# Patient Record
Sex: FEMALE | Race: WHITE | NOT HISPANIC OR LATINO
[De-identification: names, ages, dates, MRNs, and addresses within clinical notes are randomized per-mention and may not be internally consistent; named-entity substitution may affect disease eponyms.]

---

## 2019-02-05 PROBLEM — Z00.00 ENCOUNTER FOR PREVENTIVE HEALTH EXAMINATION: Status: ACTIVE | Noted: 2019-02-05

## 2019-03-07 ENCOUNTER — APPOINTMENT (OUTPATIENT)
Dept: ORTHOPEDIC SURGERY | Facility: CLINIC | Age: 74
End: 2019-03-07

## 2023-02-16 ENCOUNTER — INPATIENT (INPATIENT)
Facility: HOSPITAL | Age: 78
LOS: 11 days | Discharge: NURSING, REHAB OR LONG-TERM CARE | DRG: 380 | End: 2023-02-28
Attending: INTERNAL MEDICINE | Admitting: STUDENT IN AN ORGANIZED HEALTH CARE EDUCATION/TRAINING PROGRAM
Payer: MEDICARE

## 2023-02-16 VITALS
OXYGEN SATURATION: 92 % | WEIGHT: 184.97 LBS | HEIGHT: 65 IN | HEART RATE: 84 BPM | DIASTOLIC BLOOD PRESSURE: 62 MMHG | SYSTOLIC BLOOD PRESSURE: 87 MMHG

## 2023-02-16 DIAGNOSIS — D64.9 ANEMIA, UNSPECIFIED: ICD-10-CM

## 2023-02-16 DIAGNOSIS — F41.9 ANXIETY DISORDER, UNSPECIFIED: ICD-10-CM

## 2023-02-16 DIAGNOSIS — J69.0 PNEUMONITIS DUE TO INHALATION OF FOOD AND VOMIT: ICD-10-CM

## 2023-02-16 DIAGNOSIS — I10 ESSENTIAL (PRIMARY) HYPERTENSION: ICD-10-CM

## 2023-02-16 DIAGNOSIS — E78.5 HYPERLIPIDEMIA, UNSPECIFIED: ICD-10-CM

## 2023-02-16 DIAGNOSIS — K22.10 ULCER OF ESOPHAGUS WITHOUT BLEEDING: ICD-10-CM

## 2023-02-16 DIAGNOSIS — Z29.9 ENCOUNTER FOR PROPHYLACTIC MEASURES, UNSPECIFIED: ICD-10-CM

## 2023-02-16 LAB
ALBUMIN SERPL ELPH-MCNC: 2.3 G/DL — LOW (ref 3.3–5)
ALBUMIN SERPL ELPH-MCNC: 2.7 G/DL — LOW (ref 3.3–5)
ALP SERPL-CCNC: 64 U/L — SIGNIFICANT CHANGE UP (ref 40–120)
ALP SERPL-CCNC: 73 U/L — SIGNIFICANT CHANGE UP (ref 40–120)
ALT FLD-CCNC: 14 U/L — SIGNIFICANT CHANGE UP (ref 10–45)
ALT FLD-CCNC: 17 U/L — SIGNIFICANT CHANGE UP (ref 10–45)
ANION GAP SERPL CALC-SCNC: 11 MMOL/L — SIGNIFICANT CHANGE UP (ref 5–17)
ANION GAP SERPL CALC-SCNC: 8 MMOL/L — SIGNIFICANT CHANGE UP (ref 5–17)
APPEARANCE UR: CLEAR — SIGNIFICANT CHANGE UP
APTT BLD: 25.8 SEC — LOW (ref 27.5–35.5)
AST SERPL-CCNC: 15 U/L — SIGNIFICANT CHANGE UP (ref 10–40)
AST SERPL-CCNC: 18 U/L — SIGNIFICANT CHANGE UP (ref 10–40)
BASOPHILS # BLD AUTO: 0.04 K/UL — SIGNIFICANT CHANGE UP (ref 0–0.2)
BASOPHILS # BLD AUTO: 0.1 K/UL — SIGNIFICANT CHANGE UP (ref 0–0.2)
BASOPHILS NFR BLD AUTO: 0.4 % — SIGNIFICANT CHANGE UP (ref 0–2)
BASOPHILS NFR BLD AUTO: 0.9 % — SIGNIFICANT CHANGE UP (ref 0–2)
BILIRUB SERPL-MCNC: 0.2 MG/DL — SIGNIFICANT CHANGE UP (ref 0.2–1.2)
BILIRUB SERPL-MCNC: 0.2 MG/DL — SIGNIFICANT CHANGE UP (ref 0.2–1.2)
BILIRUB UR-MCNC: NEGATIVE — SIGNIFICANT CHANGE UP
BLD GP AB SCN SERPL QL: NEGATIVE — SIGNIFICANT CHANGE UP
BLD GP AB SCN SERPL QL: NEGATIVE — SIGNIFICANT CHANGE UP
BUN SERPL-MCNC: 32 MG/DL — HIGH (ref 7–23)
BUN SERPL-MCNC: 44 MG/DL — HIGH (ref 7–23)
CALCIUM SERPL-MCNC: 8.5 MG/DL — SIGNIFICANT CHANGE UP (ref 8.4–10.5)
CALCIUM SERPL-MCNC: 9.2 MG/DL — SIGNIFICANT CHANGE UP (ref 8.4–10.5)
CHLORIDE SERPL-SCNC: 101 MMOL/L — SIGNIFICANT CHANGE UP (ref 96–108)
CHLORIDE SERPL-SCNC: 102 MMOL/L — SIGNIFICANT CHANGE UP (ref 96–108)
CO2 SERPL-SCNC: 26 MMOL/L — SIGNIFICANT CHANGE UP (ref 22–31)
CO2 SERPL-SCNC: 27 MMOL/L — SIGNIFICANT CHANGE UP (ref 22–31)
COLOR SPEC: YELLOW — SIGNIFICANT CHANGE UP
CREAT SERPL-MCNC: 1.14 MG/DL — SIGNIFICANT CHANGE UP (ref 0.5–1.3)
CREAT SERPL-MCNC: 1.77 MG/DL — HIGH (ref 0.5–1.3)
DIFF PNL FLD: ABNORMAL
EGFR: 29 ML/MIN/1.73M2 — LOW
EGFR: 49 ML/MIN/1.73M2 — LOW
EOSINOPHIL # BLD AUTO: 0.19 K/UL — SIGNIFICANT CHANGE UP (ref 0–0.5)
EOSINOPHIL # BLD AUTO: 0.42 K/UL — SIGNIFICANT CHANGE UP (ref 0–0.5)
EOSINOPHIL NFR BLD AUTO: 1.7 % — SIGNIFICANT CHANGE UP (ref 0–6)
EOSINOPHIL NFR BLD AUTO: 4.7 % — SIGNIFICANT CHANGE UP (ref 0–6)
GAS PNL BLDV: SIGNIFICANT CHANGE UP
GLUCOSE SERPL-MCNC: 104 MG/DL — HIGH (ref 70–99)
GLUCOSE SERPL-MCNC: 129 MG/DL — HIGH (ref 70–99)
GLUCOSE UR QL: NEGATIVE — SIGNIFICANT CHANGE UP
HCT VFR BLD CALC: 24.7 % — LOW (ref 34.5–45)
HCT VFR BLD CALC: 28.5 % — LOW (ref 34.5–45)
HGB BLD-MCNC: 7.9 G/DL — LOW (ref 11.5–15.5)
HGB BLD-MCNC: 9 G/DL — LOW (ref 11.5–15.5)
IMM GRANULOCYTES NFR BLD AUTO: 0.2 % — SIGNIFICANT CHANGE UP (ref 0–0.9)
INR BLD: 1.15 — SIGNIFICANT CHANGE UP (ref 0.88–1.16)
KETONES UR-MCNC: NEGATIVE — SIGNIFICANT CHANGE UP
LACTATE SERPL-SCNC: 0.9 MMOL/L — SIGNIFICANT CHANGE UP (ref 0.5–2)
LACTATE SERPL-SCNC: 2.5 MMOL/L — HIGH (ref 0.5–2)
LEUKOCYTE ESTERASE UR-ACNC: NEGATIVE — SIGNIFICANT CHANGE UP
LIDOCAIN IGE QN: 58 U/L — SIGNIFICANT CHANGE UP (ref 7–60)
LYMPHOCYTES # BLD AUTO: 0.3 K/UL — LOW (ref 1–3.3)
LYMPHOCYTES # BLD AUTO: 0.44 K/UL — LOW (ref 1–3.3)
LYMPHOCYTES # BLD AUTO: 2.6 % — LOW (ref 13–44)
LYMPHOCYTES # BLD AUTO: 4.9 % — LOW (ref 13–44)
MAGNESIUM SERPL-MCNC: 1.6 MG/DL — SIGNIFICANT CHANGE UP (ref 1.6–2.6)
MAGNESIUM SERPL-MCNC: 1.6 MG/DL — SIGNIFICANT CHANGE UP (ref 1.6–2.6)
MCHC RBC-ENTMCNC: 29.9 PG — SIGNIFICANT CHANGE UP (ref 27–34)
MCHC RBC-ENTMCNC: 30.7 PG — SIGNIFICANT CHANGE UP (ref 27–34)
MCHC RBC-ENTMCNC: 31.6 GM/DL — LOW (ref 32–36)
MCHC RBC-ENTMCNC: 32 GM/DL — SIGNIFICANT CHANGE UP (ref 32–36)
MCV RBC AUTO: 94.7 FL — SIGNIFICANT CHANGE UP (ref 80–100)
MCV RBC AUTO: 96.1 FL — SIGNIFICANT CHANGE UP (ref 80–100)
MONOCYTES # BLD AUTO: 0.5 K/UL — SIGNIFICANT CHANGE UP (ref 0–0.9)
MONOCYTES # BLD AUTO: 0.69 K/UL — SIGNIFICANT CHANGE UP (ref 0–0.9)
MONOCYTES NFR BLD AUTO: 4.4 % — SIGNIFICANT CHANGE UP (ref 2–14)
MONOCYTES NFR BLD AUTO: 7.7 % — SIGNIFICANT CHANGE UP (ref 2–14)
NEUTROPHILS # BLD AUTO: 10.33 K/UL — HIGH (ref 1.8–7.4)
NEUTROPHILS # BLD AUTO: 7.4 K/UL — SIGNIFICANT CHANGE UP (ref 1.8–7.4)
NEUTROPHILS NFR BLD AUTO: 82.1 % — HIGH (ref 43–77)
NEUTROPHILS NFR BLD AUTO: 90.4 % — HIGH (ref 43–77)
NITRITE UR-MCNC: POSITIVE
NRBC # BLD: 0 /100 WBCS — SIGNIFICANT CHANGE UP (ref 0–0)
NT-PROBNP SERPL-SCNC: 397 PG/ML — HIGH (ref 0–300)
PH UR: 6 — SIGNIFICANT CHANGE UP (ref 5–8)
PHOSPHATE SERPL-MCNC: 3.6 MG/DL — SIGNIFICANT CHANGE UP (ref 2.5–4.5)
PLATELET # BLD AUTO: 225 K/UL — SIGNIFICANT CHANGE UP (ref 150–400)
PLATELET # BLD AUTO: 239 K/UL — SIGNIFICANT CHANGE UP (ref 150–400)
POTASSIUM SERPL-MCNC: 3 MMOL/L — LOW (ref 3.5–5.3)
POTASSIUM SERPL-MCNC: 3.3 MMOL/L — LOW (ref 3.5–5.3)
POTASSIUM SERPL-SCNC: 3 MMOL/L — LOW (ref 3.5–5.3)
POTASSIUM SERPL-SCNC: 3.3 MMOL/L — LOW (ref 3.5–5.3)
PROCALCITONIN SERPL-MCNC: 0.2 NG/ML — HIGH (ref 0.02–0.1)
PROT SERPL-MCNC: 4.6 G/DL — LOW (ref 6–8.3)
PROT SERPL-MCNC: 5.3 G/DL — LOW (ref 6–8.3)
PROT UR-MCNC: 30 MG/DL
PROTHROM AB SERPL-ACNC: 13.7 SEC — HIGH (ref 10.5–13.4)
RBC # BLD: 2.57 M/UL — LOW (ref 3.8–5.2)
RBC # BLD: 3.01 M/UL — LOW (ref 3.8–5.2)
RBC # FLD: 13.7 % — SIGNIFICANT CHANGE UP (ref 10.3–14.5)
RBC # FLD: 13.9 % — SIGNIFICANT CHANGE UP (ref 10.3–14.5)
RH IG SCN BLD-IMP: POSITIVE — SIGNIFICANT CHANGE UP
RH IG SCN BLD-IMP: POSITIVE — SIGNIFICANT CHANGE UP
SARS-COV-2 RNA SPEC QL NAA+PROBE: NEGATIVE — SIGNIFICANT CHANGE UP
SODIUM SERPL-SCNC: 136 MMOL/L — SIGNIFICANT CHANGE UP (ref 135–145)
SODIUM SERPL-SCNC: 139 MMOL/L — SIGNIFICANT CHANGE UP (ref 135–145)
SP GR SPEC: 1.02 — SIGNIFICANT CHANGE UP (ref 1–1.03)
TROPONIN T SERPL-MCNC: <0.01 NG/ML — SIGNIFICANT CHANGE UP (ref 0–0.01)
UROBILINOGEN FLD QL: 0.2 E.U./DL — SIGNIFICANT CHANGE UP
WBC # BLD: 11.43 K/UL — HIGH (ref 3.8–10.5)
WBC # BLD: 9.01 K/UL — SIGNIFICANT CHANGE UP (ref 3.8–10.5)
WBC # FLD AUTO: 11.43 K/UL — HIGH (ref 3.8–10.5)
WBC # FLD AUTO: 9.01 K/UL — SIGNIFICANT CHANGE UP (ref 3.8–10.5)

## 2023-02-16 PROCEDURE — 99291 CRITICAL CARE FIRST HOUR: CPT

## 2023-02-16 PROCEDURE — 71045 X-RAY EXAM CHEST 1 VIEW: CPT | Mod: 26

## 2023-02-16 PROCEDURE — 99223 1ST HOSP IP/OBS HIGH 75: CPT

## 2023-02-16 RX ORDER — PANTOPRAZOLE SODIUM 20 MG/1
40 TABLET, DELAYED RELEASE ORAL ONCE
Refills: 0 | Status: COMPLETED | OUTPATIENT
Start: 2023-02-16 | End: 2023-02-16

## 2023-02-16 RX ORDER — ARIPIPRAZOLE 15 MG/1
2 TABLET ORAL EVERY 24 HOURS
Refills: 0 | Status: DISCONTINUED | OUTPATIENT
Start: 2023-02-17 | End: 2023-02-28

## 2023-02-16 RX ORDER — LAMOTRIGINE 25 MG/1
100 TABLET, ORALLY DISINTEGRATING ORAL EVERY 24 HOURS
Refills: 0 | Status: DISCONTINUED | OUTPATIENT
Start: 2023-02-17 | End: 2023-02-28

## 2023-02-16 RX ORDER — PANTOPRAZOLE SODIUM 20 MG/1
8 TABLET, DELAYED RELEASE ORAL
Qty: 80 | Refills: 0 | Status: DISCONTINUED | OUTPATIENT
Start: 2023-02-16 | End: 2023-02-17

## 2023-02-16 RX ORDER — AMPICILLIN SODIUM AND SULBACTAM SODIUM 250; 125 MG/ML; MG/ML
3 INJECTION, POWDER, FOR SUSPENSION INTRAMUSCULAR; INTRAVENOUS ONCE
Refills: 0 | Status: COMPLETED | OUTPATIENT
Start: 2023-02-16 | End: 2023-02-16

## 2023-02-16 RX ORDER — SODIUM CHLORIDE 9 MG/ML
500 INJECTION INTRAMUSCULAR; INTRAVENOUS; SUBCUTANEOUS ONCE
Refills: 0 | Status: COMPLETED | OUTPATIENT
Start: 2023-02-16 | End: 2023-02-16

## 2023-02-16 RX ORDER — SUCRALFATE 1 G
1 TABLET ORAL EVERY 6 HOURS
Refills: 0 | Status: DISCONTINUED | OUTPATIENT
Start: 2023-02-16 | End: 2023-02-28

## 2023-02-16 RX ORDER — SODIUM CHLORIDE 9 MG/ML
1000 INJECTION INTRAMUSCULAR; INTRAVENOUS; SUBCUTANEOUS ONCE
Refills: 0 | Status: COMPLETED | OUTPATIENT
Start: 2023-02-16 | End: 2023-02-16

## 2023-02-16 RX ORDER — CEFTRIAXONE 500 MG/1
2000 INJECTION, POWDER, FOR SOLUTION INTRAMUSCULAR; INTRAVENOUS EVERY 24 HOURS
Refills: 0 | Status: DISCONTINUED | OUTPATIENT
Start: 2023-02-16 | End: 2023-02-17

## 2023-02-16 RX ORDER — MAGNESIUM SULFATE 500 MG/ML
2 VIAL (ML) INJECTION ONCE
Refills: 0 | Status: COMPLETED | OUTPATIENT
Start: 2023-02-16 | End: 2023-02-16

## 2023-02-16 RX ORDER — PANTOPRAZOLE SODIUM 20 MG/1
40 TABLET, DELAYED RELEASE ORAL EVERY 12 HOURS
Refills: 0 | Status: DISCONTINUED | OUTPATIENT
Start: 2023-02-16 | End: 2023-02-16

## 2023-02-16 RX ORDER — POTASSIUM CHLORIDE 20 MEQ
20 PACKET (EA) ORAL
Refills: 0 | Status: COMPLETED | OUTPATIENT
Start: 2023-02-16 | End: 2023-02-17

## 2023-02-16 RX ORDER — ONDANSETRON 8 MG/1
4 TABLET, FILM COATED ORAL EVERY 8 HOURS
Refills: 0 | Status: DISCONTINUED | OUTPATIENT
Start: 2023-02-16 | End: 2023-02-28

## 2023-02-16 RX ADMIN — PANTOPRAZOLE SODIUM 40 MILLIGRAM(S): 20 TABLET, DELAYED RELEASE ORAL at 10:59

## 2023-02-16 RX ADMIN — CEFTRIAXONE 100 MILLIGRAM(S): 500 INJECTION, POWDER, FOR SOLUTION INTRAMUSCULAR; INTRAVENOUS at 18:52

## 2023-02-16 RX ADMIN — SODIUM CHLORIDE 1000 MILLILITER(S): 9 INJECTION INTRAMUSCULAR; INTRAVENOUS; SUBCUTANEOUS at 13:45

## 2023-02-16 RX ADMIN — AMPICILLIN SODIUM AND SULBACTAM SODIUM 200 GRAM(S): 250; 125 INJECTION, POWDER, FOR SUSPENSION INTRAMUSCULAR; INTRAVENOUS at 11:41

## 2023-02-16 RX ADMIN — PANTOPRAZOLE SODIUM 10 MG/HR: 20 TABLET, DELAYED RELEASE ORAL at 19:33

## 2023-02-16 RX ADMIN — SODIUM CHLORIDE 1000 MILLILITER(S): 9 INJECTION INTRAMUSCULAR; INTRAVENOUS; SUBCUTANEOUS at 15:59

## 2023-02-16 RX ADMIN — SODIUM CHLORIDE 1000 MILLILITER(S): 9 INJECTION INTRAMUSCULAR; INTRAVENOUS; SUBCUTANEOUS at 11:41

## 2023-02-16 NOTE — PATIENT PROFILE ADULT - NS PRO AD PATIENT TYPE
Living Will Do Not Resuscitate (DNR)/Medical Orders for Life-Sustaining Treatment (MOLST)/Living Will

## 2023-02-16 NOTE — ED ADULT NURSE NOTE - OBJECTIVE STATEMENT
Patient presents to ED c/o vomiting black fluid x 1 week with SOB on exertion. Patient was hypotensive and hypoxic with EMS and placed on 3L via NC. Patient recently had a fall from her bed yesterday but was seen and treated. Denies taking blood thinner and patient denies having an appetite x 2 days. Lt wrist 18g IV placed via EMS. Patient resting in bed in no acute distress at this time with no vomiting present. Placed on cardiac monitor.

## 2023-02-16 NOTE — H&P ADULT - PROBLEM SELECTOR PLAN 2
Pt w/ worsening cough and voice hoarseness i/s/o daily emesis, found to have mild leukocytosis, neutrophilic predominance, and lactate of 2.5 w/ left lobe infiltrate on CXR. Initially hypotensive on presentation to ED 80s/60s. now improved after 2.5 L NS. Likely aspiration PNA 2/2 intractable vomiting vs CAP. s/p 1 dose of unasyn in the ED. Currently on 3L NC satting 100    - CTX 2g QD  - aspiration precautions  - Wean O2 as tolerated  - incentive spirometry  - OOBTC

## 2023-02-16 NOTE — H&P ADULT - NSHPLABSRESULTS_GEN_ALL_CORE
LABS:                         9.0    11.43 )-----------( 239      ( 2023 10:55 )             28.5         139  |  101  |  44<H>  ----------------------------<  129<H>  3.3<L>   |  27  |  1.77<H>    Ca    9.2      2023 10:55  Mg     1.6         TPro  5.3<L>  /  Alb  2.7<L>  /  TBili  0.2  /  DBili  x   /  AST  18  /  ALT  17  /  AlkPhos  73      PT/INR - ( 2023 10:55 )   PT: 13.7 sec;   INR: 1.15          PTT - ( 2023 10:55 )  PTT:25.8 sec  Urinalysis Basic - ( 2023 15:41 )    Color: Yellow / Appearance: Clear / S.020 / pH: x  Gluc: x / Ketone: NEGATIVE  / Bili: Negative / Urobili: 0.2 E.U./dL   Blood: x / Protein: 30 mg/dL / Nitrite: POSITIVE   Leuk Esterase: NEGATIVE / RBC: < 5 /HPF / WBC < 5 /HPF   Sq Epi: x / Non Sq Epi: 0-5 /HPF / Bacteria: Present /HPF            Lactate, Blood: 0.9 mmol/L ( @ 15:28)  Lactate, Blood: 2.5 mmol/L ( @ 10:55)      RADIOLOGY, EKG & ADDITIONAL TESTS: Reviewed.

## 2023-02-16 NOTE — H&P ADULT - ASSESSMENT
78 y/o F w/ hx of hiatal hernia and esophageal ulcer, presenting with 6 days of dark black emesis, hoarseness, and anorexia, admitted for UGIB and aspiration PNA.

## 2023-02-16 NOTE — ED ADULT NURSE NOTE - NSIMPLEMENTINTERV_GEN_ALL_ED
Implemented All Fall Risk Interventions:  [place] to call system. Call bell, personal items and telephone within reach. Instruct patient to call for assistance. Room bathroom lighting operational. Non-slip footwear when patient is off stretcher. Physically safe environment: no spills, clutter or unnecessary equipment. Stretcher in lowest position, wheels locked, appropriate side rails in place. Provide visual cue, wrist band, yellow gown, etc. Monitor gait and stability. Monitor for mental status changes and reorient to person, place, and time. Review medications for side effects contributing to fall risk. Reinforce activity limits and safety measures with patient and family.

## 2023-02-16 NOTE — H&P ADULT - PROBLEM SELECTOR PLAN 7
F: none  E: replete K<4, Mg<2  N: NPO    VTE Prophylaxis: hold i/s/o active UGIB  GI: protonix ggt  C: DNR/DNI (new molst in chart)  D: RMF

## 2023-02-16 NOTE — H&P ADULT - NSHPREVIEWOFSYSTEMS_GEN_ALL_CORE
REVIEW OF SYSTEMS:  CONSTITUTIONAL: ++ weakness, no fevers, chills, changes in weight  EYES/ENT: No visual changes;  No vertigo or throat pain   NECK: No pain or stiffness  RESPIRATORY: ++cough, no wheezing, +hemoptysis; No shortness of breath  CARDIOVASCULAR: no chest pain or palpitations  GASTROINTESTINAL: No abdominal or epigastric pain. ++vomiting, ++ hematemesis; No diarrhea or constipation. No melena or hematochezia.  GENITOURINARY: No dysuria, frequency or hematuria  NEUROLOGICAL: No numbness or weakness  SKIN: No itching, rashes

## 2023-02-16 NOTE — H&P ADULT - NSHPPHYSICALEXAM_GEN_ALL_CORE
VITAL SIGNS:  T(C): 36.1 (02-16-23 @ 11:03), Max: 36.1 (02-16-23 @ 11:03)  T(F): 96.9 (02-16-23 @ 11:03), Max: 96.9 (02-16-23 @ 11:03)  HR: 87 (02-16-23 @ 13:01) (84 - 87)  BP: 109/51 (02-16-23 @ 13:01) (87/62 - 110/56)  BP(mean): --  RR: 18 (02-16-23 @ 13:01) (18 - 18)  SpO2: 98% (02-16-23 @ 13:01) (92% - 100%)  Wt(kg): --    PHYSICAL EXAM:  Constitutional: elderly female,  resting comfortably in bed; NAD, on nasal cannula, very hoarse voice  Head: NC/AT  Eyes: PER, EOMI, anicteric sclera  ENT: no nasal discharge; uvula midline, no oropharyngeal erythema or exudates; MMM  Neck: supple; no JVD or thyromegaly, no lymphadenopathy  Respiratory: CTA B/L; no W/R/R, no retractions, poor respiratory effort  Cardiac: +S1/S2; RRR; no M/R/G  Gastrointestinal: abdomen soft, NT/ND; no rebound or guarding, no palpable masses  Extremities: WWP, no clubbing or cyanosis; trace pitting peripheral edema in b/l LE  Musculoskeletal: NROM x4; no joint swelling, tenderness or erythema  Vascular: 2+ radial, DP pulses B/L  Dermatologic: skin warm, dry and intact; no rashes, wounds, or scars  Neurologic: AAOx3; CNII-XII grossly intact; no focal deficits  Psychiatric: affect and characteristics of appearance, verbalizations, behaviors are appropriate

## 2023-02-16 NOTE — H&P ADULT - PROBLEM SELECTOR PLAN 1
#UGIB  Pt w/ known hx of esophageal ulcer, diagnosed at [facility] in 1/2023, discharged on carafate and protonix with surgery f/u for hiatal hernia repair, now p/w 6 days of dark black emesis after drinking a glass of wine. Pt reports daily episodes of emesis, and has since developed a cough and hoarseness of her voice. Found to have mild leukocytosis, neutrophilic predominance, and lactate of 2.5 w/ left lobe infiltrate on CXR. Denies any bright red blood in her vomit, no abdominal pain, diarrhea, hematochezia, or melena. Denies any dizziness or lightheadedness.    - protonix ggt  - carafate 1g oral sln q6hrs  - NPO   - GI consulted, pending recs #GIB  #Coffee-ground emesis  Pt w/ known hx of esophageal ulcer, diagnosed at [facility] in 1/2023, discharged on carafate and protonix with surgery f/u for hiatal hernia repair, now p/w 6 days of dark black emesis after drinking a glass of wine. Pt reports daily episodes of emesis, and has since developed a cough and hoarseness of her voice. Found to have mild leukocytosis, neutrophilic predominance, and lactate of 2.5 w/ left lobe infiltrate on CXR. Denies any bright red blood in her vomit, no abdominal pain, diarrhea, hematochezia, or melena. Denies any dizziness or lightheadedness.    - protonix ggt  - carafate 1g oral sln q6hrs  - NPO   - GI consulted, pending recs

## 2023-02-16 NOTE — PATIENT PROFILE ADULT - FALL HARM RISK - HARM RISK INTERVENTIONS
Assistance with ambulation/Assistance OOB with selected safe patient handling equipment/Communicate Risk of Fall with Harm to all staff/Reinforce activity limits and safety measures with patient and family/Tailored Fall Risk Interventions/Use of alarms - bed, chair and/or voice tab/Visual Cue: Yellow wristband and red socks/Bed in lowest position, wheels locked, appropriate side rails in place/Call bell, personal items and telephone in reach/Instruct patient to call for assistance before getting out of bed or chair/Non-slip footwear when patient is out of bed/[place] to call system/Physically safe environment - no spills, clutter or unnecessary equipment/Purposeful Proactive Rounding/Room/bathroom lighting operational, light cord in reach

## 2023-02-16 NOTE — ED PROVIDER NOTE - OBJECTIVE STATEMENT
history of large hiatial hernia with esophageal ulcer diagnosed at [facility] 1/23, currently on carafate, protonix, zofran, high cholesterol on atorvastatin, also on abilify, iron, paxil, lamotrigine, here with nausea/vomiting since friday. Reports daily vomiting episodes, sometimes sees coffee grounds. Also feels short of breath with exertion and has developed a cough and raspy voice. No fever, chills. Not on blood thinners.

## 2023-02-16 NOTE — ED ADULT NURSE NOTE - PRIMARY CARE PROVIDER
6/7/2018        RE: Francesco Killian  [facility]  [id number]  [facility] [id number]          [facility]    HPI:    Francesco Killian is a 89 year old  (12/26/1922), who is being seen today for an episodic care visit at [facility].   HPI information obtained from: facility chart records and facility staff. Today's concern is INR/Coumadin management for A. Fib    Bleeding Signs/Symptoms:  Yes, had episode of blood in stool earlier this week  Thromboembolic Signs/Symptoms:  None    Medication Changes:  Yes: increased omeprazole  Dietary Changes:  No  Activity Changes: No  Bacterial/Viral Infection:  No    Missed Coumadin Doses:  None    On ASA: Yes - 81 mg po q day    Other Concerns:  Yes: monitor closely, dual anticoagulation, episode of blood in stool earlier this week     OBJECTIVE:    INR Today:  2.4  Current Dose:  2mg Thurs, Friday, Saturday, Tuesday, 3mg Sunday, Monday, Wednesday       ASSESSMENT:  Chronic atrial fibrillation (H)  Encounter for therapeutic drug monitoring  Long-term (current) use of anticoagulants      Therapeutic INR for goal of 2-3    PLAN:    New Dose: No Change      Next INR: 1 week  -Check hgb weekly X 1 month  -staff to update if any further episodes of blood in the stool        Electronically signed by:  CODY Diaz CNP            Sincerely,        CODY Diaz CNP    
Filipe Pereira

## 2023-02-16 NOTE — H&P ADULT - NSICDXPASTMEDICALHX_GEN_ALL_CORE_FT
PAST MEDICAL HISTORY:  Anxiety     Hiatal hernia     High cholesterol     History of esophageal ulcer     HTN (hypertension)     Major depression     Seizure disorder

## 2023-02-16 NOTE — H&P ADULT - ATTENDING COMMENTS
#GIB  #nausea with coffee ground emesis   #symptomatic anemia   #hx of esophageal Ulcers   #severe sepsis 2/2 suspected asp pna  #MARYA   #epilepsy   #hiatal hernia   #dvt ppx   #gi ppx   #GOC: DNR/DNI     hgb 9 on admission   s/p 2 L ivf, keep active type n screen, NPO for now, with PPI IV BID, will add carafate when tolerating po   denies using nsaids, asa, AC, recent admission at [facility] in jan-will obtain collaterals in am   transfuse prbc w active bleed or if hgb < 7, cbc q8h -> repeat at 8 pm   GI consulted   severe sepsis with lactate > 2.5 -> trended down to .9 after ivf, wbc 11.4 ( < 12k), bp 87/62 on admission   EKG with [facility] in v5-6, will obtain collateral to compare old ekg. fu trops  will start on rocephin, fu blood and sputum cx   will cw epilepsy medications   asp precaution, fu s/s when pt is not npo   dvt ppx w scd and GI ppx w PPI

## 2023-02-16 NOTE — H&P ADULT - HISTORY OF PRESENT ILLNESS
78 y/o F PMH of hiatal hernia, esophageal ulcer (diagnosed at [facility] in 1/2023), HLD, depression (on aripiprazole and paroxetine), and seizure disorder (last seizure 2 years ago, on lamotrigine), p/w 6 days of dark black emesis after drinking a glass of wine last Friday. Pt was recently evaluated at [facility] for similar symptoms, found to have an esophageal ulcer on EGD, and was discharged on carafate and protonix.  She has since had daily episodes of dark black emesis, dry cough, and hoarseness of her voice. She also c/o decreased appetite and general malaise. She denies any abdominal pain, shortness of breath, diarrhea, chest pain, palpitations, headaches, dizziness, dysuria, constipation, melena, or hematochezia.     In the ED:  Initial vital signs: T: 96.9 F, HR: 84, BP: 87/62--> 110/56 s/p, R: 18, SpO2: 93% on RA--> 100% on 3L  Labs: WBC 11.43, Hgb 9.0, MCV 94.7 Neutrophils 10.33, lactate 2.5-->0.9, K 3.3, BUN 44, Cr 1.77, pH 7.28  CXR: LLL consolidat  EKG:   Medications:   Consults: none      76 y/o F PMH of hiatal hernia, esophageal ulcer (diagnosed at [facility] in 1/2023), HLD, depression (on aripiprazole and paroxetine), and seizure disorder (last seizure 2 years ago, on lamotrigine), p/w 6 days of dark black emesis after drinking a glass of wine last Friday. Pt was recently evaluated at [facility] for similar symptoms, found to have an esophageal ulcer on EGD, and was discharged on carafate and protonix.  She has since had daily episodes of dark black emesis, dry cough, and hoarseness of her voice. She also c/o decreased appetite and general malaise. She denies any abdominal pain, shortness of breath, diarrhea, chest pain, palpitations, headaches, dizziness, dysuria, constipation, melena, or hematochezia.     In the ED:  Initial vital signs: T: 96.9 F, HR: 84, BP: 87/62--> 110/56 s/p, R: 18, SpO2: 93% on RA--> 100% on 3L  Labs: WBC 11.43, Hgb 9.0, MCV 94.7 Neutrophils 10.33, lactate 2.5-->0.9, K 3.3, BUN 44, Cr 1.77, pH 7.28  CXR: Left lung consolidation  Medications: unasyn x1, protonix IV x1, 2.5 L NS

## 2023-02-16 NOTE — H&P ADULT - NSHPSOCIALHISTORY_GEN_ALL_CORE
Lives with a roommate, , never smoker, no illicit drug use, occasional glass of wine, Carries out all ADLs independently. Uses rollator at baseline

## 2023-02-16 NOTE — H&P ADULT - PROBLEM SELECTOR PLAN 3
Hgb 9.0 unknown baseline,  MCV 94 2/2 to active hematemesis i/s/o known esophageal ulcer    -f/u iron studies  -trend CBC  -maintain active T&S (last (date))  -transfuse if Hgb <7

## 2023-02-16 NOTE — ED PROVIDER NOTE - CLINICAL SUMMARY MEDICAL DECISION MAKING FREE TEXT BOX
known hiatial hernia with episodes of vomiting past few days. possible coffee ground emesis. now coughing, change in voice. + sirs criteria on arrival and concern for sepsis.  labs/ blood and urine cultures obtained and started 30/kg fluid bolus along with empiric unasyn for suspected aspiration pneumonia. cxr with left sided infiltrates. initial lactate elevated. improved on repeat after ivf. wbc 11. given protonix for reported history of ulcer/ possible coffee ground emesis but had brown emesis on face in ed. no repeat episodes while in ed. admit to medicine for further management

## 2023-02-16 NOTE — H&P ADULT - PROBLEM SELECTOR PLAN 5
On home losartan 100 mg QD.    - hold i/s/o hypotension, active UGIB, and Cr 1.77 w/ unknown baseline  - restart as clinically indicated

## 2023-02-17 ENCOUNTER — TRANSCRIPTION ENCOUNTER (OUTPATIENT)
Age: 78
End: 2023-02-17

## 2023-02-17 LAB
ALBUMIN SERPL ELPH-MCNC: 2.5 G/DL — LOW (ref 3.3–5)
ALBUMIN SERPL ELPH-MCNC: 2.9 G/DL — LOW (ref 3.3–5)
ALP SERPL-CCNC: 77 U/L — SIGNIFICANT CHANGE UP (ref 40–120)
ALP SERPL-CCNC: 86 U/L — SIGNIFICANT CHANGE UP (ref 40–120)
ALT FLD-CCNC: 14 U/L — SIGNIFICANT CHANGE UP (ref 10–45)
ALT FLD-CCNC: 14 U/L — SIGNIFICANT CHANGE UP (ref 10–45)
ANION GAP SERPL CALC-SCNC: 5 MMOL/L — SIGNIFICANT CHANGE UP (ref 5–17)
ANION GAP SERPL CALC-SCNC: 7 MMOL/L — SIGNIFICANT CHANGE UP (ref 5–17)
ANION GAP SERPL CALC-SCNC: 8 MMOL/L — SIGNIFICANT CHANGE UP (ref 5–17)
APTT BLD: 26 SEC — LOW (ref 27.5–35.5)
APTT BLD: 26.1 SEC — LOW (ref 27.5–35.5)
AST SERPL-CCNC: 12 U/L — SIGNIFICANT CHANGE UP (ref 10–40)
AST SERPL-CCNC: 15 U/L — SIGNIFICANT CHANGE UP (ref 10–40)
BASE EXCESS BLDV CALC-SCNC: 1.7 MMOL/L — SIGNIFICANT CHANGE UP (ref -2–3)
BASOPHILS # BLD AUTO: 0.05 K/UL — SIGNIFICANT CHANGE UP (ref 0–0.2)
BASOPHILS # BLD AUTO: 0.06 K/UL — SIGNIFICANT CHANGE UP (ref 0–0.2)
BASOPHILS NFR BLD AUTO: 0.5 % — SIGNIFICANT CHANGE UP (ref 0–2)
BASOPHILS NFR BLD AUTO: 0.7 % — SIGNIFICANT CHANGE UP (ref 0–2)
BILIRUB SERPL-MCNC: 0.2 MG/DL — SIGNIFICANT CHANGE UP (ref 0.2–1.2)
BILIRUB SERPL-MCNC: 0.2 MG/DL — SIGNIFICANT CHANGE UP (ref 0.2–1.2)
BUN SERPL-MCNC: 19 MG/DL — SIGNIFICANT CHANGE UP (ref 7–23)
BUN SERPL-MCNC: 23 MG/DL — SIGNIFICANT CHANGE UP (ref 7–23)
BUN SERPL-MCNC: 25 MG/DL — HIGH (ref 7–23)
CALCIUM SERPL-MCNC: 8.7 MG/DL — SIGNIFICANT CHANGE UP (ref 8.4–10.5)
CALCIUM SERPL-MCNC: 9 MG/DL — SIGNIFICANT CHANGE UP (ref 8.4–10.5)
CALCIUM SERPL-MCNC: 9 MG/DL — SIGNIFICANT CHANGE UP (ref 8.4–10.5)
CHLORIDE SERPL-SCNC: 103 MMOL/L — SIGNIFICANT CHANGE UP (ref 96–108)
CHLORIDE SERPL-SCNC: 104 MMOL/L — SIGNIFICANT CHANGE UP (ref 96–108)
CHLORIDE SERPL-SCNC: 106 MMOL/L — SIGNIFICANT CHANGE UP (ref 96–108)
CO2 BLDV-SCNC: 27 MMOL/L — HIGH (ref 22–26)
CO2 SERPL-SCNC: 26 MMOL/L — SIGNIFICANT CHANGE UP (ref 22–31)
CO2 SERPL-SCNC: 26 MMOL/L — SIGNIFICANT CHANGE UP (ref 22–31)
CO2 SERPL-SCNC: 29 MMOL/L — SIGNIFICANT CHANGE UP (ref 22–31)
CREAT SERPL-MCNC: 0.76 MG/DL — SIGNIFICANT CHANGE UP (ref 0.5–1.3)
CREAT SERPL-MCNC: 0.91 MG/DL — SIGNIFICANT CHANGE UP (ref 0.5–1.3)
CREAT SERPL-MCNC: 0.96 MG/DL — SIGNIFICANT CHANGE UP (ref 0.5–1.3)
EGFR: 60 ML/MIN/1.73M2 — SIGNIFICANT CHANGE UP
EGFR: 64 ML/MIN/1.73M2 — SIGNIFICANT CHANGE UP
EGFR: 80 ML/MIN/1.73M2 — SIGNIFICANT CHANGE UP
EOSINOPHIL # BLD AUTO: 0.1 K/UL — SIGNIFICANT CHANGE UP (ref 0–0.5)
EOSINOPHIL # BLD AUTO: 0.36 K/UL — SIGNIFICANT CHANGE UP (ref 0–0.5)
EOSINOPHIL NFR BLD AUTO: 0.9 % — SIGNIFICANT CHANGE UP (ref 0–6)
EOSINOPHIL NFR BLD AUTO: 4.1 % — SIGNIFICANT CHANGE UP (ref 0–6)
GAS PNL BLDV: SIGNIFICANT CHANGE UP
GLUCOSE SERPL-MCNC: 103 MG/DL — HIGH (ref 70–99)
GLUCOSE SERPL-MCNC: 113 MG/DL — HIGH (ref 70–99)
GLUCOSE SERPL-MCNC: 120 MG/DL — HIGH (ref 70–99)
HCO3 BLDV-SCNC: 26 MMOL/L — SIGNIFICANT CHANGE UP (ref 22–29)
HCT VFR BLD CALC: 25.2 % — LOW (ref 34.5–45)
HCT VFR BLD CALC: 25.5 % — LOW (ref 34.5–45)
HCT VFR BLD CALC: 26 % — LOW (ref 34.5–45)
HGB BLD-MCNC: 7.8 G/DL — LOW (ref 11.5–15.5)
HGB BLD-MCNC: 8 G/DL — LOW (ref 11.5–15.5)
HGB BLD-MCNC: 8.1 G/DL — LOW (ref 11.5–15.5)
IMM GRANULOCYTES NFR BLD AUTO: 0.5 % — SIGNIFICANT CHANGE UP (ref 0–0.9)
IMM GRANULOCYTES NFR BLD AUTO: 0.7 % — SIGNIFICANT CHANGE UP (ref 0–0.9)
INR BLD: 1.13 — SIGNIFICANT CHANGE UP (ref 0.88–1.16)
INR BLD: 1.13 — SIGNIFICANT CHANGE UP (ref 0.88–1.16)
LACTATE SERPL-SCNC: 0.6 MMOL/L — SIGNIFICANT CHANGE UP (ref 0.5–2)
LYMPHOCYTES # BLD AUTO: 0.22 K/UL — LOW (ref 1–3.3)
LYMPHOCYTES # BLD AUTO: 0.54 K/UL — LOW (ref 1–3.3)
LYMPHOCYTES # BLD AUTO: 2.1 % — LOW (ref 13–44)
LYMPHOCYTES # BLD AUTO: 6.2 % — LOW (ref 13–44)
MAGNESIUM SERPL-MCNC: 2.2 MG/DL — SIGNIFICANT CHANGE UP (ref 1.6–2.6)
MCHC RBC-ENTMCNC: 29.6 PG — SIGNIFICANT CHANGE UP (ref 27–34)
MCHC RBC-ENTMCNC: 29.9 PG — SIGNIFICANT CHANGE UP (ref 27–34)
MCHC RBC-ENTMCNC: 29.9 PG — SIGNIFICANT CHANGE UP (ref 27–34)
MCHC RBC-ENTMCNC: 30.8 GM/DL — LOW (ref 32–36)
MCHC RBC-ENTMCNC: 31 GM/DL — LOW (ref 32–36)
MCHC RBC-ENTMCNC: 31.8 GM/DL — LOW (ref 32–36)
MCV RBC AUTO: 94.1 FL — SIGNIFICANT CHANGE UP (ref 80–100)
MCV RBC AUTO: 96.3 FL — SIGNIFICANT CHANGE UP (ref 80–100)
MCV RBC AUTO: 96.6 FL — SIGNIFICANT CHANGE UP (ref 80–100)
MONOCYTES # BLD AUTO: 0.52 K/UL — SIGNIFICANT CHANGE UP (ref 0–0.9)
MONOCYTES # BLD AUTO: 0.82 K/UL — SIGNIFICANT CHANGE UP (ref 0–0.9)
MONOCYTES NFR BLD AUTO: 4.9 % — SIGNIFICANT CHANGE UP (ref 2–14)
MONOCYTES NFR BLD AUTO: 9.4 % — SIGNIFICANT CHANGE UP (ref 2–14)
NEUTROPHILS # BLD AUTO: 6.93 K/UL — SIGNIFICANT CHANGE UP (ref 1.8–7.4)
NEUTROPHILS # BLD AUTO: 9.75 K/UL — HIGH (ref 1.8–7.4)
NEUTROPHILS NFR BLD AUTO: 79.1 % — HIGH (ref 43–77)
NEUTROPHILS NFR BLD AUTO: 90.9 % — HIGH (ref 43–77)
NRBC # BLD: 0 /100 WBCS — SIGNIFICANT CHANGE UP (ref 0–0)
PCO2 BLDV: 38 MMHG — LOW (ref 39–42)
PH BLDV: 7.44 — HIGH (ref 7.32–7.43)
PHOSPHATE SERPL-MCNC: 2.8 MG/DL — SIGNIFICANT CHANGE UP (ref 2.5–4.5)
PLATELET # BLD AUTO: 203 K/UL — SIGNIFICANT CHANGE UP (ref 150–400)
PLATELET # BLD AUTO: 214 K/UL — SIGNIFICANT CHANGE UP (ref 150–400)
PLATELET # BLD AUTO: 229 K/UL — SIGNIFICANT CHANGE UP (ref 150–400)
PO2 BLDV: 60 MMHG — HIGH (ref 25–45)
POTASSIUM SERPL-MCNC: 3.6 MMOL/L — SIGNIFICANT CHANGE UP (ref 3.5–5.3)
POTASSIUM SERPL-MCNC: 3.8 MMOL/L — SIGNIFICANT CHANGE UP (ref 3.5–5.3)
POTASSIUM SERPL-MCNC: 4.2 MMOL/L — SIGNIFICANT CHANGE UP (ref 3.5–5.3)
POTASSIUM SERPL-SCNC: 3.6 MMOL/L — SIGNIFICANT CHANGE UP (ref 3.5–5.3)
POTASSIUM SERPL-SCNC: 3.8 MMOL/L — SIGNIFICANT CHANGE UP (ref 3.5–5.3)
POTASSIUM SERPL-SCNC: 4.2 MMOL/L — SIGNIFICANT CHANGE UP (ref 3.5–5.3)
PROT SERPL-MCNC: 4.5 G/DL — LOW (ref 6–8.3)
PROT SERPL-MCNC: 4.9 G/DL — LOW (ref 6–8.3)
PROTHROM AB SERPL-ACNC: 13.5 SEC — HIGH (ref 10.5–13.4)
PROTHROM AB SERPL-ACNC: 13.5 SEC — HIGH (ref 10.5–13.4)
RBC # BLD: 2.61 M/UL — LOW (ref 3.8–5.2)
RBC # BLD: 2.7 M/UL — LOW (ref 3.8–5.2)
RBC # BLD: 2.71 M/UL — LOW (ref 3.8–5.2)
RBC # FLD: 14 % — SIGNIFICANT CHANGE UP (ref 10.3–14.5)
RBC # FLD: 14.1 % — SIGNIFICANT CHANGE UP (ref 10.3–14.5)
RBC # FLD: 14.2 % — SIGNIFICANT CHANGE UP (ref 10.3–14.5)
SAO2 % BLDV: 91.5 % — HIGH (ref 67–88)
SODIUM SERPL-SCNC: 136 MMOL/L — SIGNIFICANT CHANGE UP (ref 135–145)
SODIUM SERPL-SCNC: 138 MMOL/L — SIGNIFICANT CHANGE UP (ref 135–145)
SODIUM SERPL-SCNC: 140 MMOL/L — SIGNIFICANT CHANGE UP (ref 135–145)
WBC # BLD: 10.71 K/UL — HIGH (ref 3.8–10.5)
WBC # BLD: 8.75 K/UL — SIGNIFICANT CHANGE UP (ref 3.8–10.5)
WBC # BLD: 8.85 K/UL — SIGNIFICANT CHANGE UP (ref 3.8–10.5)
WBC # FLD AUTO: 10.71 K/UL — HIGH (ref 3.8–10.5)
WBC # FLD AUTO: 8.75 K/UL — SIGNIFICANT CHANGE UP (ref 3.8–10.5)
WBC # FLD AUTO: 8.85 K/UL — SIGNIFICANT CHANGE UP (ref 3.8–10.5)

## 2023-02-17 PROCEDURE — 88305 TISSUE EXAM BY PATHOLOGIST: CPT | Mod: 26

## 2023-02-17 PROCEDURE — 99222 1ST HOSP IP/OBS MODERATE 55: CPT

## 2023-02-17 PROCEDURE — 71045 X-RAY EXAM CHEST 1 VIEW: CPT | Mod: 26

## 2023-02-17 PROCEDURE — 99291 CRITICAL CARE FIRST HOUR: CPT

## 2023-02-17 PROCEDURE — 99233 SBSQ HOSP IP/OBS HIGH 50: CPT | Mod: GC

## 2023-02-17 RX ORDER — PIPERACILLIN AND TAZOBACTAM 4; .5 G/20ML; G/20ML
4.5 INJECTION, POWDER, LYOPHILIZED, FOR SOLUTION INTRAVENOUS EVERY 8 HOURS
Refills: 0 | Status: COMPLETED | OUTPATIENT
Start: 2023-02-18 | End: 2023-02-25

## 2023-02-17 RX ORDER — PIPERACILLIN AND TAZOBACTAM 4; .5 G/20ML; G/20ML
4.5 INJECTION, POWDER, LYOPHILIZED, FOR SOLUTION INTRAVENOUS ONCE
Refills: 0 | Status: COMPLETED | OUTPATIENT
Start: 2023-02-17 | End: 2023-02-17

## 2023-02-17 RX ORDER — PIPERACILLIN AND TAZOBACTAM 4; .5 G/20ML; G/20ML
4.5 INJECTION, POWDER, LYOPHILIZED, FOR SOLUTION INTRAVENOUS ONCE
Refills: 0 | Status: COMPLETED | OUTPATIENT
Start: 2023-02-18 | End: 2023-02-18

## 2023-02-17 RX ORDER — PANTOPRAZOLE SODIUM 20 MG/1
40 TABLET, DELAYED RELEASE ORAL EVERY 12 HOURS
Refills: 0 | Status: DISCONTINUED | OUTPATIENT
Start: 2023-02-17 | End: 2023-02-18

## 2023-02-17 RX ORDER — PIPERACILLIN AND TAZOBACTAM 4; .5 G/20ML; G/20ML
4.5 INJECTION, POWDER, LYOPHILIZED, FOR SOLUTION INTRAVENOUS ONCE
Refills: 0 | Status: DISCONTINUED | OUTPATIENT
Start: 2023-02-18 | End: 2023-02-18

## 2023-02-17 RX ADMIN — Medication 1 GRAM(S): at 17:06

## 2023-02-17 RX ADMIN — Medication 1 GRAM(S): at 01:42

## 2023-02-17 RX ADMIN — Medication 50 MILLIEQUIVALENT(S): at 03:47

## 2023-02-17 RX ADMIN — PIPERACILLIN AND TAZOBACTAM 200 GRAM(S): 4; .5 INJECTION, POWDER, LYOPHILIZED, FOR SOLUTION INTRAVENOUS at 16:45

## 2023-02-17 RX ADMIN — PIPERACILLIN AND TAZOBACTAM 25 GRAM(S): 4; .5 INJECTION, POWDER, LYOPHILIZED, FOR SOLUTION INTRAVENOUS at 20:37

## 2023-02-17 RX ADMIN — ARIPIPRAZOLE 2 MILLIGRAM(S): 15 TABLET ORAL at 17:05

## 2023-02-17 RX ADMIN — PANTOPRAZOLE SODIUM 40 MILLIGRAM(S): 20 TABLET, DELAYED RELEASE ORAL at 16:45

## 2023-02-17 RX ADMIN — Medication 50 MILLIEQUIVALENT(S): at 06:07

## 2023-02-17 RX ADMIN — Medication 25 GRAM(S): at 00:35

## 2023-02-17 RX ADMIN — Medication 30 MILLIGRAM(S): at 09:21

## 2023-02-17 RX ADMIN — Medication 50 MILLIEQUIVALENT(S): at 01:31

## 2023-02-17 RX ADMIN — Medication 1 GRAM(S): at 05:54

## 2023-02-17 RX ADMIN — PANTOPRAZOLE SODIUM 10 MG/HR: 20 TABLET, DELAYED RELEASE ORAL at 04:04

## 2023-02-17 RX ADMIN — LAMOTRIGINE 100 MILLIGRAM(S): 25 TABLET, ORALLY DISINTEGRATING ORAL at 09:21

## 2023-02-17 NOTE — PROGRESS NOTE ADULT - PROBLEM SELECTOR PLAN 4
On home abilify 2 mg QD, paxil 40 qd, and lorazepam 1g PRN.  - C/w abilify and paxil  - Hold lorazepam

## 2023-02-17 NOTE — PROGRESS NOTE ADULT - PROBLEM SELECTOR PLAN 1
#GIB  #Coffee-ground emesis  Pt w/ known hx of esophageal ulcer, diagnosed at [facility] in 1/2023, discharged on carafate and protonix with surgery f/u for hiatal hernia repair, now p/w 6 days of dark black emesis after drinking a glass of wine. Pt reports daily episodes of emesis, and has since developed a cough and hoarseness of her voice. Found to have mild leukocytosis, neutrophilic predominance, and lactate of 2.5 w/ left lobe infiltrate on CXR. Denies any bright red blood in her vomit, no abdominal pain, diarrhea, hematochezia, or melena. Denies any dizziness or lightheadedness.  - endoscopy report in chart, showing Grade D esophagitis and esophageal ulcer + 10cm type III paraesophageal hernia  - protonix 40mg q12h  - carafate 1g oral sln q6hrs  - f/u endoscopy results  - f/u GI recs

## 2023-02-17 NOTE — PROGRESS NOTE ADULT - PROBLEM SELECTOR PLAN 1
#GIB  #Coffee-ground emesis  Pt w/ known hx of esophageal ulcer, diagnosed at [facility] in 1/2023, discharged on carafate and protonix with surgery f/u for hiatal hernia repair, now p/w 6 days of dark black emesis after drinking a glass of wine. Pt reports daily episodes of emesis, and has since developed a cough and hoarseness of her voice. Found to have mild leukocytosis, neutrophilic predominance, and lactate of 2.5 w/ left lobe infiltrate on CXR. Denies any bright red blood in her vomit, no abdominal pain, diarrhea, hematochezia, or melena. Denies any dizziness or lightheadedness.  Endoscopy findings:   A large size paraesophageal hiatal hernia was seen, the esophagogastric junction(EGJ) was noted at 40 cm, with hiatal narrowing at 34 cm from the incisors. Additional findings include ulceration. Retroflexion view in the stomach confirmed the size and morphology of the hernia.  -Grade D esophagitis with no bleeding was seen starting at 30 cm from the incisors in the lower third of the esophagus and middle third of the esophagus, compatible with nonspecific erosive esophagitis. This is likely the source of coffee ground emesis  -Localized irregularity of the mucosa was noted in the Z-line and gastroesophageal junction.  -Liquefied food material was found in the stomach. Findings were suggestive of gastroparesis/delayed gastric emptying.  -Patchy erythema of the mucosa with no bleeding was noted in the stomach body. These findings are compatible with non-erosive gastritis. Cold forceps biopsies were performed for H. pylori in the stomach body and stomach antrum.  -Normal appearing duodenum  Recs:   - NPO for 4 hrs, then if well, clear diet and advance as tolerated    - Please given reglan 10 mg IV  x1 as prokinetic if no contraindications  - Pantoprazole 40 mg PO BID    - Carafate Suspension 1g po qid     - Barium Swallow with tablet recommended  - protonix 40mg q12h  - carafate 1g oral sln q6hrs  - Recommend CT surg consult

## 2023-02-17 NOTE — CONSULT NOTE ADULT - SUBJECTIVE AND OBJECTIVE BOX
HPI:  76 y/o F PMH of hiatal hernia, esophageal ulcer (diagnosed at [facility] in 1/2023), HLD, depression (on aripiprazole and paroxetine), and seizure disorder (last seizure 2 years ago, on lamotrigine), presented to [facility] ED in PM on 2/16 with 6 days of dark black emesis. Sx began after drinking a glass of wine but persisted over the subsequent days.     Pt was recently evaluated at [facility] for similar symptoms, found to have an esophageal ulcer on EGD, and was discharged on carafate and protonix.  She has since had daily episodes of dark black emesis, dry cough, and hoarseness of her voice. She also c/o decreased appetite and general malaise.     At bedside, she denies any abdominal pain, shortness of breath, diarrhea, chest pain, palpitations, headaches, dizziness, dysuria, constipation, melena, or hematochezia.     Denies NSAID use and has been taking tylenol for pain.  Not on anticoagulation.    In the ED:  Initial vital signs: T: 96.9 F, HR: 84, BP: 87/62--> 110/56 s/p, R: 18, SpO2: 93% on RA--> 100% on 3L  Labs: WBC 11.43, Hgb 9.0, MCV 94.7 Neutrophils 10.33, lactate 2.5-->0.9, K 3.3, BUN 44, Cr 1.77, pH 7.28  CXR: Left lung consolidation  Medications: unasyn x1, protonix IV x1, 2.5 L NS     (16 Feb 2023 17:07)    Allergies    lanolin topical (Rash)    Intolerances      Home Medications:  ARIPiprazole 2 mg oral tablet: 1 tab(s) orally once a day (16 Feb 2023 18:03)  atorvastatin 20 mg oral tablet: 1 tab(s) orally once a day (16 Feb 2023 18:04)  Carafate 1 g oral tablet: 1 tab(s) orally 4 times a day (before meals and at bedtime) (16 Feb 2023 18:03)  lamoTRIgine 100 mg oral tablet, extended release: 1 tab(s) orally once a day (16 Feb 2023 18:02)  LORazepam 1 mg oral tablet: 1 tab(s) orally 3 times a day (16 Feb 2023 18:03)  losartan 100 mg oral tablet: 1 tab(s) orally once a day (16 Feb 2023 18:04)  Paxil 40 mg oral tablet: 1 tab(s) orally once a day (16 Feb 2023 18:25)  Protonix 40 mg oral delayed release tablet: 1 tab(s) orally once a day (16 Feb 2023 18:03)    MEDICATIONS:  MEDICATIONS  (STANDING):  ARIPiprazole 2 milliGRAM(s) Oral every 24 hours  cefTRIAXone   IVPB 2000 milliGRAM(s) IV Intermittent every 24 hours  lamoTRIgine 100 milliGRAM(s) Oral every 24 hours  pantoprazole Infusion 8 mG/Hr (10 mL/Hr) IV Continuous <Continuous>  PARoxetine 30 milliGRAM(s) Oral every 24 hours  sucralfate suspension 1 Gram(s) Oral every 6 hours    MEDICATIONS  (PRN):  ondansetron Injectable 4 milliGRAM(s) IV Push every 8 hours PRN Nausea and/or Vomiting    PAST MEDICAL & SURGICAL HISTORY:  Hiatal hernia  High cholesterol  History of esophageal ulcer  HTN (hypertension)  Major depression  Seizure disorder  Anxiety    No significant past surgical history      FAMILY HISTORY:  No pertinent family history in first degree relatives      SOCIAL HISTORY:  Never smoker  Occassional wine    REVIEW OF SYSTEMS:  All other 10 review of systems is negative unless indicated above.    Vital Signs Last 24 Hrs  T(C): 36.7 (17 Feb 2023 05:30), Max: 37.1 (16 Feb 2023 23:02)  T(F): 98 (17 Feb 2023 05:30), Max: 98.7 (16 Feb 2023 23:02)  HR: 71 (17 Feb 2023 05:30) (71 - 87)  BP: 95/52 (17 Feb 2023 05:30) (87/62 - 110/56)  BP(mean): 74 (16 Feb 2023 23:02) (74 - 74)  RR: 18 (17 Feb 2023 05:30) (16 - 20)  SpO2: 100% (17 Feb 2023 05:30) (92% - 100%)    Parameters below as of 17 Feb 2023 05:30  Patient On (Oxygen Delivery Method): nasal cannula  O2 Flow (L/min): 3        PHYSICAL EXAM:    General: No acute distress  Eyes: Anicteric sclerae, moist conjunctivae  HENT: Dry mucous membranes  Neck: Trachea midline, supple  Lungs: Normal respiratory effort and no intercostal retractions  Cardiovascular: RRR  Abdomen: Soft, non-tender non-distended; No rebound or guarding  Extremities: Normal range of motion, No clubbing, cyanosis or edema  Neurological: Alert and oriented x3  Skin: Warm and dry. No obvious rash    LABS:                        7.9    9.01  )-----------( 225      ( 16 Feb 2023 22:42 )             24.7     02-16    136  |  102  |  32<H>  ----------------------------<  104<H>  3.0<L>   |  26  |  1.14    Ca    8.5      16 Feb 2023 22:42  Phos  3.6     02-16  Mg     1.6     02-16    TPro  4.6<L>  /  Alb  2.3<L>  /  TBili  0.2  /  DBili  x   /  AST  15  /  ALT  14  /  AlkPhos  64  02-16      Culture Results:   No growth at 12 hours (02-16 @ 10:34)  Culture Results:   No growth at 12 hours (02-16 @ 10:34)    PT/INR - ( 16 Feb 2023 10:55 )   PT: 13.7 sec;   INR: 1.15          PTT - ( 16 Feb 2023 10:55 )  PTT:25.8 sec    Urinalysis with Rflx Culture (collected 16 Feb 2023 15:41)    Culture - Blood (collected 16 Feb 2023 10:34)  Source: .Blood Blood-Peripheral  Preliminary Report (17 Feb 2023 02:01):    No growth at 12 hours    Culture - Blood (collected 16 Feb 2023 10:34)  Source: .Blood Blood-Peripheral  Preliminary Report (17 Feb 2023 02:00):    No growth at 12 hours      RADIOLOGY & ADDITIONAL STUDIES:     Reviewed

## 2023-02-17 NOTE — CHART NOTE - NSCHARTNOTEFT_GEN_A_CORE
|   Name: Sherrie Heredia | : 1945 | MRN: [id number] | PCP: Filipe Pereira MD | Legal Name: Sherrie Heredia  UPPER GI ENDOSCOPY,BIOPSY - Details    Component Results  Component	Your Value	Standard Range  UPPER GI ENDOSCOPY	_______________________________________________________________________________  Patient Name: Sherrie Heredia Procedure Date: 2023 2:59 PM  MRN: [id number] Account #: [id number]  YOB: 1945 Admit Type: Inpatient  Age: 77 Room: [address] Room 1  Gender: Female Note Status: Finalized  Attending MD: Ephraim Bruno MD  _______________________________________________________________________________    Procedure: Upper GI endoscopy  Indications: Coffee-ground emesis  Providers: Ephraim Bruno MD, Annie Hancock, RN, Heather Concepcion (Fellow)  Referring MD:  Medicines: Monitored Anesthesia Care  Complications: No immediate complications.  _______________________________________________________________________________  Procedure: Pre-Anesthesia Assessment:    - Prior to the procedure, a History and Physical was  performed, and patient medications and allergies were  reviewed. The patient is competent. The risks and  benefits of the procedure and the sedation options and  risks were discussed with the patient. All questions  were answered and informed consent was obtained.  Patient identification and proposed procedure were  verified by the physician and the nurse in the  procedure room. Mental Status Examination: normal.  Airway Examination: normal oropharyngeal airway and  neck mobility. Respiratory Examination: clear to  auscultation. CV Examination: normal. Prophylactic  Antibiotics: The patient does not require prophylactic    antibiotics. Prior Anticoagulants: The patient has  taken no anticoagulant or antiplatelet agents except  for aspirin. ASA Grade Assessment: III - A patient  with severe systemic disease. After reviewing the  risks and benefits, the patient was deemed in  satisfactory condition to undergo the procedure. The  anesthesia plan was to use general anesthesia.  Immediately prior to administration of medications,  the patient was re-assessed for adequacy to receive  sedatives. The heart rate, respiratory rate, oxygen  saturations, blood pressure, adequacy of pulmonary  ventilation, and response to care were monitored  throughout the procedure. The physical status of the    patient was re-assessed after the procedure.  After obtaining informed consent, the endoscope was  passed under direct vision. Throughout the procedure,  the patient's blood pressure, pulse, and oxygen  saturations were monitored continuously. The was  introduced through the mouth, and advanced to the  second part of duodenum. The upper GI endoscopy was  accomplished without difficulty. The patient tolerated  the procedure well.    Findings:  The Z-line was irregular and was found 36 cm from the incisors.  LA Grade D (one or more mucosal breaks involving at least 75% of  esophageal circumference) esophagitis with one ulcer with no bleeding  was found.  A 10 cm type III paraesophageal hernia  was found.  The exam of the stomach was otherwise normal.  The duodenal bulb and second portion of the duodenum were normal.    Impression: - Z-line irregular, 36 cm from the incisors.  - LA Grade D esophagitis with no bleeding.  - 10 cm type III paraesophageal hernia.  - Normal duodenal bulb and second portion of the  duodenum.  - No specimens collected.  Recommendation: - Return patient to [facility] for ongoing care.  - Full liquid diet. Advance as tolerated.  - PPI BID.  - Clarify need for aspirin 81 mg. If for primary  prevention, recommend discontinuing.  - Rest of care per surgery.      Procedure Code(s):  --- Professional ---  75480  Diagnosis Code(s):  --- Professional ---  K22.8  K20.90  K44.9  K92.0    CPT copyright 2020 American Medical Association. All rights reserved.    The codes documented in this report are preliminary and upon  review may  be revised to meet current compliance requirements.  Scope In:  Scope Out:  Attending Participation:  I was present and participated during the entire procedure, including  non-key portions.      Ephraim Bruno MD  __________________  Ephraim Bruno MD  2023 4:33:36 PM  This report has been signed electronically.    ________________  Heather Slade,  Number of Addenda: 0    Note Initiated On: 2023 2:59 PM  Estimated Blood Loss:  Estimated blood loss: none. Estimated blood loss: none.	  General Information  Ordered by Ephraim Bruno    Collected on 2023 2:59 PM    Resulted on 2023 4:33 PM    Result Status: Final result    This test result has been released by an automatic process.  MyChart® licensed from [employer] ©  -

## 2023-02-17 NOTE — CHART NOTE - NSCHARTNOTEFT_GEN_A_CORE
EGD performed with attending, Dr. Lazaro. Findings as noted:    -A large size paraesophageal hiatal hernia was seen, the esophagogastric junction(EGJ) was noted at 40 cm, with hiatal narrowing at 34 cm from the incisors. Additional findings include ulceration. Retroflexion view in the stomach confirmed the size and morphology of the hernia.  -Grade D esophagitis with no bleeding was seen starting at 30 cm from the incisors in the lower third of the esophagus and middle third of the esophagus, compatible with nonspecific erosive esophagitis. This is likely the source of coffee ground emesis  -Localized irregularity of the mucosa was noted in the Z-line and gastroesophageal junction.  -Liquefied food material was found in the stomach. Findings were suggestive of gastroparesis/delayed gastric emptying.  -Patchy erythema of the mucosa with no bleeding was noted in the stomach body. These findings are compatible with non-erosive gastritis. Cold forceps biopsies were performed for H. pylori in the stomach body and stomach antrum.  -Normal appearing duodenum    Recommendations:  - NPO for 4 hrs, then if well, clear diet and advance as tolerated    -Please given reglan 10 mg IV  x1 as prokinetic  -Pantoprazole 40 mg PO BID    -Carafate Suspension 1g po qid     -Barium Swallow with tablet recommended   -Thoracic surgery consultation recommended  -GI svc to follow up pathology  -Avoid PO iron, recommend IV iron if needed for iron deficiency    Recommend obtaining baseline [facility] from [facility]     Bryson Serrano DO  Gastroenterology Fellow  Pager: [phone] EGD performed with attending, Dr. Lazaro. Findings as noted:    -A large size paraesophageal hiatal hernia was seen, the esophagogastric junction(EGJ) was noted at 40 cm, with hiatal narrowing at 34 cm from the incisors. Additional findings include ulceration. Retroflexion view in the stomach confirmed the size and morphology of the hernia.  -Grade D esophagitis with no bleeding was seen starting at 30 cm from the incisors in the lower third of the esophagus and middle third of the esophagus, compatible with nonspecific erosive esophagitis. This is likely the source of coffee ground emesis  -Localized irregularity of the mucosa was noted in the Z-line and gastroesophageal junction.  -Liquefied food material was found in the stomach. Findings were suggestive of gastroparesis/delayed gastric emptying.  -Patchy erythema of the mucosa with no bleeding was noted in the stomach body. These findings are compatible with non-erosive gastritis. Cold forceps biopsies were performed for H. pylori in the stomach body and stomach antrum.  -Normal appearing duodenum    Recommendations:  - NPO for 4 hrs, then if well, clear diet and advance as tolerated    -Please given reglan 10 mg IV  x1 as prokinetic if no contraindications  -Pantoprazole 40 mg PO BID    -Carafate Suspension 1g po qid     -Barium Swallow with tablet recommended   -Thoracic surgery consultation recommended  -GI svc to follow up pathology  -Avoid PO iron, recommend IV iron if needed for iron deficiency    Recommend obtaining [facility] from [facility]     Bryson Serrano DO  Gastroenterology Fellow  Pager: [phone] EGD performed with attending, Dr. Lazaro. Findings as noted:    -A large size paraesophageal hiatal hernia was seen, the esophagogastric junction(EGJ) was noted at 40 cm, with hiatal narrowing at 34 cm from the incisors. Additional findings include ulceration. Retroflexion view in the stomach confirmed the size and morphology of the hernia.  -Grade D esophagitis with no bleeding was seen starting at 30 cm from the incisors in the lower third of the esophagus and middle third of the esophagus, compatible with nonspecific erosive esophagitis. This is likely the source of coffee ground emesis  -Localized irregularity of the mucosa was noted in the Z-line and gastroesophageal junction.  -Liquefied food material was found in the stomach. Findings were suggestive of gastroparesis/delayed gastric emptying.  -Patchy erythema of the mucosa with no bleeding was noted in the stomach body. These findings are compatible with non-erosive gastritis. Cold forceps biopsies were performed for H. pylori in the stomach body and stomach antrum.  -Normal appearing duodenum    Recommendations:  - NPO for 4 hrs, then if well, clear diet and advance as tolerated    -Please given reglan 10 mg IV  x1 as prokinetic if no contraindications  -Pantoprazole 40 mg PO BID    -Carafate Suspension 1g po qid     -Barium Swallow with tablet recommended   -Thoracic surgery consultation recommended  -GI svc to follow up pathology  -Avoid PO iron, recommend IV iron if needed for iron deficiency    Recommend obtaining [facility] from [facility]     Bryson Serrano DO  Gastroenterology Fellow  Pager: [phone]    UPDATE 1341:   Called to bedside by anesthesia as pt now extubated though unable to wean from Non rebreather face mask and with accessory muscle use however maintaining O2 sat %.    Was on 3 L via NC prior to procedure    IM team paged to bedside to evaluate.    Escalation of care for resp distress in setting of PNA was recommended    Bryson Serrano DO  Gastroenterology Fellow  Pager: [phone]

## 2023-02-17 NOTE — PROGRESS NOTE ADULT - PROBLEM SELECTOR PLAN 2
Pt w/ worsening cough and voice hoarseness i/s/o daily emesis, found to have mild leukocytosis, neutrophilic predominance, and lactate of 2.5 w/ left lobe infiltrate on CXR. Initially hypotensive on presentation to ED 80s/60s. now improved after 2.5 L NS. Likely aspiration PNA 2/2 intractable vomiting vs CAP. s/p 1 dose of unasyn in the ED. Currently on 3L NC satting 100    - CTX 2g QD (2/16 - )  - aspiration precautions  - Wean O2 as tolerated  - incentive spirometry  - OOBTC

## 2023-02-17 NOTE — PRE-ANESTHESIA EVALUATION ADULT - NSANTHADDINFOFT_GEN_ALL_CORE
Risks, benefits and alternatives discussed; including but not limited to headache, nausea, bleeding, infection and local trauma/positioning related injury. All questions answered. The patient agrees to proceed. Patient agrees to suspend DNR/DNI status for the perioperative period.

## 2023-02-17 NOTE — CONSULT NOTE ADULT - ASSESSMENT
76 y/o F PMH of hiatal hernia, esophageal ulcer (diagnosed at [facility] in 1/2023), HLD, depression (on aripiprazole and paroxetine), and seizure disorder (last seizure 2 years ago, on lamotrigine) admitted with anemia in setting of coffee ground emesis and PNA on CXR    #Anemia  #Coffee ground emesis    Unclear hx of esophageal ulcer, though recent EGD to eval. Adherent with PPI and carafate without clear exacerbating factors.    Left sided pulm infiltrate may represent aspiration, though more common to find this in R middle lobe for acute event.    Recommendations:  -Medically optimize. Mgmt PNA per primary team  -Please repeat AM CBC, BMP, Mg ASAP to eval for endoscopic intervention this AM  -Continue PPI 40 mg Q12hrs   -Keep NPO; plan for EGD today pending medical optimization  -Please obtain EGD record from [facility] for review  -Check iron levels; replete as needed with IV iron    Bryson Serrano DO  Gastroenterology Fellow  Pager: [phone]

## 2023-02-17 NOTE — PROGRESS NOTE ADULT - SUBJECTIVE AND OBJECTIVE BOX
INTERVAL HPI/OVERNIGHT EVENTS:  Overnight, Mg and K were repleted; otherwise no overnight events.  This morning, patient was seen at bedside. She is A&Ox4 however believes she was admitted to the hospital for a hernia. She has no acute complaints and denies any SOB. Has been NPO for scope and is scheduled for endoscopy at 9:30AM. Collateral from [facility] regarding previous endoscopy records received and in chart.    Denies any CP, palpitations, abdominal pain, dysuria, hematuria, diarrhea, or hematochezia. Last bowel movement was 2 days ago; denies changes in bowel habits or weight.    VITAL SIGNS:  T(F): 98.4 (23 @ 09:00)  HR: 74 (23 @ 10:18)  BP: 102/51 (23 @ 10:18)  RR: 18 (23 @ 10:18)  SpO2: 95% (23 @ 10:18)  Wt(kg): --      23 @ 07:01  -  23 @ 07:00  --------------------------------------------------------  IN: 440 mL / OUT: 350 mL / NET: 90 mL    23 @ 07:01  -  23 @ 10:56  --------------------------------------------------------  IN: 30 mL / OUT: 0 mL / NET: 30 mL        PHYSICAL EXAM:    Constitutional: resting comfortably in bed; NAD, on NC 3L; hoarse voice  HEENT: NC/AT, PER, anicteric sclera, no nasal discharge; MMM  Neck: supple; no JVD or thyromegaly  Respiratory: CTA B/L; no W/R/R, dec breath sounds on EVELINA  Cardiac: +S1/S2; RRR; no M/R/G  Gastrointestinal: soft, NT/ND; no rebound or guarding  Extremities: WWP, no clubbing or cyanosis; trace non-pitting peripheral edema BLE  Musculoskeletal: NROM x4; no joint swelling, tenderness or erythema  Vascular: 2+ radial, DP/PT pulses B/L  Dermatologic: skin warm, dry and intact; no obvious rashes, wounds, or scars  Neurologic: AAOx4; no focal deficits  Psychiatric: affect and characteristics of appearance, verbalizations, behaviors are appropriate    MEDICATIONS  (STANDING):  ARIPiprazole 2 milliGRAM(s) Oral every 24 hours  cefTRIAXone   IVPB 2000 milliGRAM(s) IV Intermittent every 24 hours  lamoTRIgine 100 milliGRAM(s) Oral every 24 hours  pantoprazole Infusion 8 mG/Hr (10 mL/Hr) IV Continuous <Continuous>  PARoxetine 30 milliGRAM(s) Oral every 24 hours  sucralfate suspension 1 Gram(s) Oral every 6 hours    MEDICATIONS  (PRN):  ondansetron Injectable 4 milliGRAM(s) IV Push every 8 hours PRN Nausea and/or Vomiting      Allergies    lanolin topical (Rash)    Intolerances        LABS:                        7.8    8.75  )-----------( 203      ( 2023 09:08 )             25.2     02-17    136  |  103  |  23  ----------------------------<  103<H>  3.8   |  26  |  0.91    Ca    8.7      2023 09:08  Phos  2.8     02-17  Mg     2.2     02-17    TPro  4.5<L>  /  Alb  2.5<L>  /  TBili  0.2  /  DBili  x   /  AST  15  /  ALT  14  /  AlkPhos  77  02-17    PT/INR - ( 2023 09:08 )   PT: 13.5 sec;   INR: 1.13          PTT - ( 2023 09:08 )  PTT:26.1 sec  Urinalysis Basic - ( 2023 15:41 )    Color: Yellow / Appearance: Clear / S.020 / pH: x  Gluc: x / Ketone: NEGATIVE  / Bili: Negative / Urobili: 0.2 E.U./dL   Blood: x / Protein: 30 mg/dL / Nitrite: POSITIVE   Leuk Esterase: NEGATIVE / RBC: < 5 /HPF / WBC < 5 /HPF   Sq Epi: x / Non Sq Epi: 0-5 /HPF / Bacteria: Present /HPF        RADIOLOGY & ADDITIONAL TESTS:  Reviewed ***Transfer from [facility] to [facility]***  Hospital Course:    77F PMHx of hiatal hernia, esophageal ulcer (diagnosed at [facility] in 2023), HLD, depression (on aripiprazole and paroxetine), and seizure disorder (last seizure 2 years ago, on lamotrigine), presented to [facility] ED after 6 days of dark black emesis and c/f aspiration PNA. Symptoms began after drinking a glass of wine, and have persisted. Of note, patient presented to [facility] with similar symptoms and hospital stay was prolonged due to weaning off supplemental oxygen. GI consulted. Patient was intubated for EGD, which found grade D esophagitis, ulceration, and a large hiatal hernia. Patient was extubated to NRB, SpO2 94%. Attempted to wean to 6LNC, SpO2 84%. ICU was consulted for AHRF, recommending telemetry for HFNC.      INTERVAL HPI/OVERNIGHT EVENTS:  No overnight events. This morning, patient was seen at bedside. No current complaints. Collateral from [facility] regarding previous endoscopy records received and in chart.    Denies any CP, palpitations, abdominal pain, dysuria, hematuria, diarrhea, or hematochezia. Last bowel movement was 2 days ago; denies changes in bowel habits or weight.    VITAL SIGNS:  T(F): 98.4 (23 @ 09:00)  HR: 74 (23 @ 10:18)  BP: 102/51 (23 @ 10:18)  RR: 18 (23 @ 10:18)  SpO2: 95% (23 @ 10:18)  Wt(kg): --      23 @ 07:01  -  23 @ 07:00  --------------------------------------------------------  IN: 440 mL / OUT: 350 mL / NET: 90 mL    23 @ 07:01  -  23 @ 10:56  --------------------------------------------------------  IN: 30 mL / OUT: 0 mL / NET: 30 mL        PHYSICAL EXAM:    Constitutional: resting comfortably in bed; NAD, on NC 3L; hoarse voice  HEENT: NC/AT, PER, anicteric sclera, no nasal discharge; MMM  Neck: supple; no JVD or thyromegaly  Respiratory: CTA B/L; no W/R/R, dec breath sounds on EVELINA  Cardiac: +S1/S2; RRR; no M/R/G  Gastrointestinal: soft, NT/ND; no rebound or guarding  Extremities: WWP, no clubbing or cyanosis; trace non-pitting peripheral edema BLE  Musculoskeletal: NROM x4; no joint swelling, tenderness or erythema  Vascular: 2+ radial, DP/PT pulses B/L  Dermatologic: skin warm, dry and intact; no obvious rashes, wounds, or scars  Neurologic: AAOx4; no focal deficits  Psychiatric: affect and characteristics of appearance, verbalizations, behaviors are appropriate    MEDICATIONS  (STANDING):  ARIPiprazole 2 milliGRAM(s) Oral every 24 hours  cefTRIAXone   IVPB 2000 milliGRAM(s) IV Intermittent every 24 hours  lamoTRIgine 100 milliGRAM(s) Oral every 24 hours  pantoprazole Infusion 8 mG/Hr (10 mL/Hr) IV Continuous <Continuous>  PARoxetine 30 milliGRAM(s) Oral every 24 hours  sucralfate suspension 1 Gram(s) Oral every 6 hours    MEDICATIONS  (PRN):  ondansetron Injectable 4 milliGRAM(s) IV Push every 8 hours PRN Nausea and/or Vomiting      Allergies    lanolin topical (Rash)    Intolerances        LABS:                        7.8    8.75  )-----------( 203      ( 2023 09:08 )             25.2     02-17    136  |  103  |  23  ----------------------------<  103<H>  3.8   |  26  |  0.91    Ca    8.7      2023 09:08  Phos  2.8     02-17  Mg     2.2     02-17    TPro  4.5<L>  /  Alb  2.5<L>  /  TBili  0.2  /  DBili  x   /  AST  15  /  ALT  14  /  AlkPhos  77  02-17    PT/INR - ( 2023 09:08 )   PT: 13.5 sec;   INR: 1.13          PTT - ( 2023 09:08 )  PTT:26.1 sec  Urinalysis Basic - ( 2023 15:41 )    Color: Yellow / Appearance: Clear / S.020 / pH: x  Gluc: x / Ketone: NEGATIVE  / Bili: Negative / Urobili: 0.2 E.U./dL   Blood: x / Protein: 30 mg/dL / Nitrite: POSITIVE   Leuk Esterase: NEGATIVE / RBC: < 5 /HPF / WBC < 5 /HPF   Sq Epi: x / Non Sq Epi: 0-5 /HPF / Bacteria: Present /HPF        RADIOLOGY & ADDITIONAL TESTS:  Reviewed

## 2023-02-17 NOTE — CONSULT NOTE ADULT - ASSESSMENT
per Internal Medicine    77 y o F w/ hx of hiatal hernia and esophageal ulcer, presenting with 6 days of dark black emesis, hoarseness, and anorexia, admitted for UGIB and aspiration PNA.     Problem/Plan - 1:  ·  Problem: Esophageal ulcer.   ·  Plan: #GIB  #Coffee-ground emesis  Pt w/ known hx of esophageal ulcer, diagnosed at [facility] in 1/2023, discharged on carafate and protonix with surgery f/u for hiatal hernia repair, now p/w 6 days of dark black emesis after drinking a glass of wine. Pt reports daily episodes of emesis, and has since developed a cough and hoarseness of her voice. Found to have mild leukocytosis, neutrophilic predominance, and lactate of 2.5 w/ left lobe infiltrate on CXR. Denies any bright red blood in her vomit, no abdominal pain, diarrhea, hematochezia, or melena. Denies any dizziness or lightheadedness.    - protonix ggt  - carafate 1g oral sln q6hrs  - NPO   - GI consulted, pending recs.    Problem/Plan - 2:  ·  Problem: Aspiration pneumonia of left lung.   ·  Plan: Pt w/ worsening cough and voice hoarseness i/s/o daily emesis, found to have mild leukocytosis, neutrophilic predominance, and lactate of 2.5 w/ left lobe infiltrate on CXR. Initially hypotensive on presentation to ED 80s/60s. now improved after 2.5 L NS. Likely aspiration PNA 2/2 intractable vomiting vs CAP. s/p 1 dose of unasyn in the ED. Currently on 3L NC satting 100    - CTX 2g QD  - aspiration precautions  - Wean O2 as tolerated  - incentive spirometry  - OOBTC.    Problem/Plan - 3:  ·  Problem: Anemia.   ·  Plan: Hgb 9.0 unknown baseline,  MCV 94 2/2 to active hematemesis i/s/o known esophageal ulcer    -f/u iron studies  -trend CBC  -maintain active T&S (last (date))  -transfuse if Hgb <7.    Problem/Plan - 4:  ·  Problem: Anxiety and depression.   ·  Plan: On home abilify 2 mg QD, and paxil 40 qd.    - c/w home meds.    Problem/Plan - 5:  ·  Problem: HTN (hypertension).   ·  Plan: On home losartan 100 mg QD.    - hold i/s/o hypotension, active UGIB, and Cr 1.77 w/ unknown baseline  - restart as clinically indicated.    Problem/Plan - 6:  ·  Problem: HLD (hyperlipidemia).   ·  Plan: On home atorvastatin 20 mg    - c/w home meds.    Problem/Plan - 7:  ·  Problem: Prophylactic measure.   ·  Plan: F: none  E: replete K<4, Mg<2  N: NPO    VTE Prophylaxis: hold i/s/o active UGIB  GI: protonix ggt  C: DNR/DNI (new molst in chart)  D: RMF.

## 2023-02-17 NOTE — PRE-ANESTHESIA EVALUATION ADULT - NSANTHOSAYNRD_GEN_A_CORE
No. JOSE F screening performed.  STOP BANG Legend: 0-2 = LOW Risk; 3-4 = INTERMEDIATE Risk; 5-8 = HIGH Risk

## 2023-02-17 NOTE — PRE-ANESTHESIA EVALUATION ADULT - NSANTHPEFT_GEN_ALL_CORE
General: Appearance is consistent with chronological age. No abnormal facies.  Constitutional: Alert and in no acute distress.  Eyes: The sclera and conjunctiva were normal, pupils were equal in size, round, and reactive to light and extraocular movements were intact.   ENT: The ears and nose were normal in appearance; oropharynx clear, moist mucus membranes.  Neck: The appearance of the neck was normal, no neck mass was observed. There was no jugular-venous distention.   Cardiovascular:  Rate and rhythm evaluated.  Respiratory: Unlabored breathing.  Neurological: No focal deficit, moves all extremities.  Psychiatric: Oriented to person, place, and time, insight and judgment were intact and the affect was normal.

## 2023-02-17 NOTE — PROGRESS NOTE ADULT - PROBLEM SELECTOR PLAN 7
F: none  E: replete K<4, Mg<2  N: NPO for 4 hours - will resume per GI recs   VTE Prophylaxis: hold i/s/o active UGIB  GI: protonix ggt  C: DNR/DNI (new molst in chart)  D: RMF

## 2023-02-17 NOTE — PROGRESS NOTE ADULT - ASSESSMENT
76 y/o F w/ hx of hiatal hernia and esophageal ulcer, presenting with 6 days of dark black emesis, hoarseness, and anorexia, admitted for UGIB and aspiration PNA.

## 2023-02-17 NOTE — PRE-ANESTHESIA EVALUATION ADULT - NSANTHPMHFT_GEN_ALL_CORE
78 y/o F PMH of hiatal hernia, esophageal ulcer (diagnosed at [facility] in 1/2023), HLD, depression (on aripiprazole and paroxetine), and seizure disorder (last seizure 2 years ago, on lamotrigine) admitted with anemia in setting of coffee ground emesis and PNA on CXR

## 2023-02-17 NOTE — PROGRESS NOTE ADULT - PROBLEM SELECTOR PLAN 3
Hgb 9.0 unknown baseline,  MCV 94 2/2 to active hematemesis i/s/o known esophageal ulcer    - Obtain iron studies   - Trend CBC  - Maintain active T&S (order for 2/18)  - transfuse if Hgb <7

## 2023-02-17 NOTE — CHART NOTE - NSCHARTNOTEFT_GEN_A_CORE
Welcome Bing Stark  ×  Update Personal Info  FAQ  Search Results Help  Help  ×An MME Calculator is now available by clicking the MME Calculator tab on the purple task bar.  An informational sheet can be found by clicking ‘MME Calculator Help’ on the MME Calculator page. This informational tool is a resource only and is not meant for direct clinical application.   Patient Search   Multi-Patient Search   MME Calculator   Reports   Drug List   Designation   My ANIYA #  Data Detail Level: Printer-Friendly View Extended View  Confidential Drug Utilization Report  Search Terms: adelina bass, 1945Search Date: 02/17/2023 09:47:07 AM  The Drug Utilization Report below displays all of the controlled substance prescriptions, if any, that your patient has filled in the last twelve months. The information displayed on this report is compiled from pharmacy submissions to the Department, and accurately reflects the information as submitted by the pharmacies.    This report was requested by: Bing Stark | Reference #: [id number]    You have not added a ANIYA number. Keeping your ANIYA number(s) up to date on the My ANIYA # page will enable the separation of your prescriptions from others in the search results.    Others' Prescriptions  Patient Name: Adelina BassBirth Date: 1945  Address: [address] [place] [id number]: Female  Rx Written	Rx Dispensed	Drug	Quantity	Days Supply	Prescriber Name	Prescriber Aniya #	Payment Method	Dispenser  01/25/2023	02/02/2023	lorazepam 1 mg tablet	30	30	ElaineShaji maldonado MD	[id number]	Insurance	[facility] #[id number]  11/15/2022	11/20/2022	lorazepam 1 mg tablet	30	30	ElaineShaji MD	[id number]	Insurance	[facility] #[id number]  05/16/2022	05/24/2022	lorazepam 1 mg tablet	30	30	ElaineShaji MD	[id number]	Insurance	[facility] #[id number]  03/23/2022	03/30/2022	lorazepam 1 mg tablet	30	30	ElaineShaji MD	[id number]	Insurance	[facility] #[id number]

## 2023-02-17 NOTE — PROGRESS NOTE ADULT - SUBJECTIVE AND OBJECTIVE BOX
77F PMHx of hiatal hernia, esophageal ulcer (diagnosed at [facility] in 2023), HLD, depression (on aripiprazole and paroxetine), and seizure disorder (last seizure 2 years ago, on lamotrigine), presented to [facility] ED after 6 days of dark black emesis and c/f aspiration PNA. Symptoms began after drinking a glass of wine, and have persisted. Of note, patient presented to [facility] with similar symptoms and hospital stay was prolonged due to weaning off supplemental oxygen. GI consulted. Patient was intubated for EGD, which found grade D esophagitis, ulceration, and a large hiatal hernia. Patient was extubated to NRB, SpO2 94%. Attempted to wean to 6LNC, SpO2 84%. ICU was consulted for AHRF, recommending telemetry for HFNC.      OVERNIGHT EVENTS: SHI     SUBJECTIVE / INTERVAL HPI: Patient seen and examined at bedside.     VITAL SIGNS:  Vital Signs Last 24 Hrs  T(C): 36.9 (2023 10:18), Max: 37.1 (2023 23:02)  T(F): 98.4 (2023 09:00), Max: 98.7 (2023 23:02)  HR: 75 (2023 14:15) (71 - 80)  BP: 102/51 (2023 10:18) (93/60 - 103/60)  BP(mean): 74 (2023 10:18) (74 - 74)  RR: 20 (2023 14:15) (16 - 20)  SpO2: 97% (2023 14:15) (93% - 100%)    Parameters below as of 2023 14:15  Patient On (Oxygen Delivery Method): nasal cannula, high flow  O2 Flow (L/min): 60  O2 Concentration (%): 100    PHYSICAL EXAM:    General: WDWN  HEENT: NCAT; PERRL, anicteric sclera; MMM  Neck: supple, trachea midline  Cardiovascular: S1, S2 normal; RRR, no M/G/R  Respiratory: CTABL; no W/R/R  Gastrointestinal: soft, nontender, nondistended. bowel sounds present.  Skin: no ulcerations or visible rashes appreciated  Extremities: WWP; no edema, clubbing or cyanosis  Vascular: 2+ radial, DP/PT pulses B/L  Neurological: AAOx3; CN II-XII grossly intact; no focal deficits    MEDICATIONS:  MEDICATIONS  (STANDING):  ARIPiprazole 2 milliGRAM(s) Oral every 24 hours  lamoTRIgine 100 milliGRAM(s) Oral every 24 hours  pantoprazole  Injectable 40 milliGRAM(s) IV Push every 12 hours  PARoxetine 30 milliGRAM(s) Oral every 24 hours  piperacillin/tazobactam IVPB. 4.5 Gram(s) IV Intermittent once  piperacillin/tazobactam IVPB.- 4.5 Gram(s) IV Intermittent once  sucralfate suspension 1 Gram(s) Oral every 6 hours    MEDICATIONS  (PRN):  ondansetron Injectable 4 milliGRAM(s) IV Push every 8 hours PRN Nausea and/or Vomiting      ALLERGIES:  Allergies    lanolin topical (Rash)    Intolerances        LABS:                        8.1    10.71 )-----------( 214      ( 2023 14:33 )             25.5     02-    140  |  106  |  19  ----------------------------<  120<H>  3.6   |  26  |  0.76    Ca    9.0      2023 14:33  Phos  2.8     -  Mg     2.2     -17    TPro  4.9<L>  /  Alb  2.9<L>  /  TBili  0.2  /  DBili  x   /  AST  12  /  ALT  14  /  AlkPhos  86  -17    PT/INR - ( 2023 14:33 )   PT: 13.5 sec;   INR: 1.13          PTT - ( 2023 14:33 )  PTT:26.0 sec  Urinalysis Basic - ( 2023 15:41 )    Color: Yellow / Appearance: Clear / S.020 / pH: x  Gluc: x / Ketone: NEGATIVE  / Bili: Negative / Urobili: 0.2 E.U./dL   Blood: x / Protein: 30 mg/dL / Nitrite: POSITIVE   Leuk Esterase: NEGATIVE / RBC: < 5 /HPF / WBC < 5 /HPF   Sq Epi: x / Non Sq Epi: 0-5 /HPF / Bacteria: Present /HPF      CAPILLARY BLOOD GLUCOSE          RADIOLOGY & ADDITIONAL TESTS: Reviewed. 77F PMHx of hiatal hernia, esophageal ulcer (diagnosed at [facility] in 2023), HLD, depression (on aripiprazole and paroxetine), and seizure disorder (last seizure 2 years ago, on lamotrigine), presented to [facility] ED after 6 days of dark black emesis and c/f aspiration PNA. Symptoms began after drinking a glass of wine, and have persisted. Of note, patient presented to [facility] with similar symptoms and hospital stay was prolonged due to weaning off supplemental oxygen. GI consulted. Patient was intubated for EGD, which found grade D esophagitis, ulceration, and a large hiatal hernia. Patient was extubated to NRB, SpO2 94%. Attempted to wean to 6LNC, SpO2 84%. ICU was consulted for AHRF, recommending telemetry for HFNC.      OVERNIGHT EVENTS: SHI     SUBJECTIVE / INTERVAL HPI: Patient in bed with HFNC on but without any distress speaking in full sentences comfortable     VITAL SIGNS:  Vital Signs Last 24 Hrs  T(C): 36.9 (2023 10:18), Max: 37.1 (2023 23:02)  T(F): 98.4 (2023 09:00), Max: 98.7 (2023 23:02)  HR: 75 (2023 14:15) (71 - 80)  BP: 102/51 (2023 10:18) (93/60 - 103/60)  BP(mean): 74 (2023 10:18) (74 - 74)  RR: 20 (2023 14:15) (16 - 20)  SpO2: 97% (2023 14:15) (93% - 100%)    Parameters below as of 2023 14:15  Patient On (Oxygen Delivery Method): nasal cannula, high flow  O2 Flow (L/min): 60  O2 Concentration (%): 100    PHYSICAL EXAM:    General: In bed in no acute distress   HEENT: NCAT; PERRL, anicteric sclera; MMM  Neck: supple, trachea midline  Cardiovascular: S1, S2 normal; RRR, no M/G/R  Respiratory: +bibasilar crackles, more prominent on RLL, on HFNC 50/95  Gastrointestinal: abdomen soft, NT/ND; no rebound or guarding; +BSx4  Skin: no ulcerations or visible rashes appreciated  Extremities: WWP; no edema, clubbing or cyanosis  Vascular: 2+ radial, DP/PT pulses B/L  Neurological: AAOx3; CN II-XII grossly intact; no focal deficits    MEDICATIONS:  MEDICATIONS  (STANDING):  ARIPiprazole 2 milliGRAM(s) Oral every 24 hours  lamoTRIgine 100 milliGRAM(s) Oral every 24 hours  pantoprazole  Injectable 40 milliGRAM(s) IV Push every 12 hours  PARoxetine 30 milliGRAM(s) Oral every 24 hours  piperacillin/tazobactam IVPB. 4.5 Gram(s) IV Intermittent once  piperacillin/tazobactam IVPB.- 4.5 Gram(s) IV Intermittent once  sucralfate suspension 1 Gram(s) Oral every 6 hours    MEDICATIONS  (PRN):  ondansetron Injectable 4 milliGRAM(s) IV Push every 8 hours PRN Nausea and/or Vomiting      ALLERGIES:  Allergies    lanolin topical (Rash)    Intolerances        LABS:                        8.1    10.71 )-----------( 214      ( 2023 14:33 )             25.5     02-17    140  |  106  |  19  ----------------------------<  120<H>  3.6   |  26  |  0.76    Ca    9.0      2023 14:33  Phos  2.8     02-17  Mg     2.2     02-17    TPro  4.9<L>  /  Alb  2.9<L>  /  TBili  0.2  /  DBili  x   /  AST  12  /  ALT  14  /  AlkPhos  86  02-17    PT/INR - ( 2023 14:33 )   PT: 13.5 sec;   INR: 1.13          PTT - ( 2023 14:33 )  PTT:26.0 sec  Urinalysis Basic - ( 2023 15:41 )    Color: Yellow / Appearance: Clear / S.020 / pH: x  Gluc: x / Ketone: NEGATIVE  / Bili: Negative / Urobili: 0.2 E.U./dL   Blood: x / Protein: 30 mg/dL / Nitrite: POSITIVE   Leuk Esterase: NEGATIVE / RBC: < 5 /HPF / WBC < 5 /HPF   Sq Epi: x / Non Sq Epi: 0-5 /HPF / Bacteria: Present /HPF      CAPILLARY BLOOD GLUCOSE          RADIOLOGY & ADDITIONAL TESTS: Reviewed.

## 2023-02-17 NOTE — PROGRESS NOTE ADULT - PROBLEM SELECTOR PLAN 2
Pt w/ worsening cough and voice hoarseness i/s/o daily emesis, found to have mild leukocytosis, neutrophilic predominance, and lactate of 2.5 w/ left lobe infiltrate on CXR. Initially hypotensive on presentation to ED 80s/60s. now improved after 2.5 L NS. Likely aspiration PNA 2/2 intractable vomiting vs CAP. s/p 1 dose of unasyn in the ED, started on ceftx 2g q24 which was transitioned to Zosyn with pseudomonal coverage due to concern for aspiration pneumonia post endoscopy. Currently patient on HFNC 60L/100%    - Zosyn 4.5 q4hrs   - F/u sputum culture   - blood culture NGTD from 2/16  - Aspiration precautions  - Wean O2 as tolerated  - incentive spirometry  - OOBTC

## 2023-02-17 NOTE — CONSULT NOTE ADULT - SUBJECTIVE AND OBJECTIVE BOX
HPI:    VITAL SIGNS:  ICU Vital Signs Last 24 Hrs  T(C): 36.9 (2023 10:18), Max: 37.1 (2023 23:02)  T(F): 98.4 (2023 09:00), Max: 98.7 (2023 23:02)  HR: 74 (2023 10:18) (71 - 80)  BP: 102/51 (2023 10:18) (93/60 - 103/60)  BP(mean): 74 (2023 10:18) (74 - 74)  ABP: --  ABP(mean): --  RR: 18 (2023 10:18) (16 - 20)  SpO2: 95% (2023 10:18) (93% - 100%)    O2 Parameters below as of 2023 10:18    O2 Flow (L/min): 3            -16 @ 07:01  -  -17 @ 07:00  --------------------------------------------------------  IN: 440 mL / OUT: 350 mL / NET: 90 mL    -17 @ 07:01  -  02-17 @ 14:12  --------------------------------------------------------  IN: 30 mL / OUT: 0 mL / NET: 30 mL      CAPILLARY BLOOD GLUCOSE          PHYSICAL EXAM:    Constitutional: NAD  HEENT: NC/AT; PERRL, anicteric sclera; MMM  Neck: supple, no JVD  Cardiovascular: +S1/S2, RRR  Respiratory: CTA B/L, no W/R/R  Gastrointestinal: abdomen soft, NT/ND; no rebound or guarding; +BSx4  Genitourinary: no suprapubic tenderness or fullness  Extremities: WWP; no LE edema; no clubbing or cyanosis  Vascular: 2+ radial, DP/PT and femoral pulses B/L  Dermatologic: normal color and turgor; no visible rashes  Neurological:     MEDICATIONS:  MEDICATIONS  (STANDING):  ARIPiprazole 2 milliGRAM(s) Oral every 24 hours  cefTRIAXone   IVPB 2000 milliGRAM(s) IV Intermittent every 24 hours  lamoTRIgine 100 milliGRAM(s) Oral every 24 hours  pantoprazole Infusion 8 mG/Hr (10 mL/Hr) IV Continuous <Continuous>  PARoxetine 30 milliGRAM(s) Oral every 24 hours  sucralfate suspension 1 Gram(s) Oral every 6 hours    MEDICATIONS  (PRN):  ondansetron Injectable 4 milliGRAM(s) IV Push every 8 hours PRN Nausea and/or Vomiting      ALLERGIES:  Allergies    lanolin topical (Rash)    Intolerances        LABS:                        7.8    8.75  )-----------( 203      ( 2023 09:08 )             25.2     02-17    136  |  103  |  23  ----------------------------<  103<H>  3.8   |  26  |  0.91    Ca    8.7      2023 09:08  Phos  2.8     02-17  Mg     2.2     02-17    TPro  4.5<L>  /  Alb  2.5<L>  /  TBili  0.2  /  DBili  x   /  AST  15  /  ALT  14  /  AlkPhos  77  02-17    PT/INR - ( 2023 09:08 )   PT: 13.5 sec;   INR: 1.13          PTT - ( 2023 09:08 )  PTT:26.1 sec  Urinalysis Basic - ( 2023 15:41 )    Color: Yellow / Appearance: Clear / S.020 / pH: x  Gluc: x / Ketone: NEGATIVE  / Bili: Negative / Urobili: 0.2 E.U./dL   Blood: x / Protein: 30 mg/dL / Nitrite: POSITIVE   Leuk Esterase: NEGATIVE / RBC: < 5 /HPF / WBC < 5 /HPF   Sq Epi: x / Non Sq Epi: 0-5 /HPF / Bacteria: Present /HPF        RADIOLOGY & ADDITIONAL TESTS: Reviewed. HPI:  77F PMHx of hiatal hernia, esophageal ulcer (diagnosed at [facility] in 2023), HLD, depression (on aripiprazole and paroxetine), and seizure disorder (last seizure 2 years ago, on lamotrigine), presented to [facility] ED after 6 days of dark black emesis and c/f aspiration PNA. Patient endorsed symptoms starting after drinking a glass of wine, but has persisted. GI was consulted. Patient was intubated for EGD, which found grade D esophagitis, a large hiatal hernia, liquefied food contents in the stomach. Patient was extubated to NRB, SpO2 94%. Attempted to wean to 6LNC, SpO2 84%. ICU was consulted for AHRF              VITAL SIGNS:  ICU Vital Signs Last 24 Hrs  T(C): 36.9 (2023 10:18), Max: 37.1 (2023 23:02)  T(F): 98.4 (2023 09:00), Max: 98.7 (2023 23:02)  HR: 74 (2023 10:18) (71 - 80)  BP: 102/51 (2023 10:18) (93/60 - 103/60)  BP(mean): 74 (2023 10:18) (74 - 74)  ABP: --  ABP(mean): --  RR: 18 (2023 10:18) (16 - 20)  SpO2: 95% (2023 10:18) (93% - 100%)    O2 Parameters below as of 2023 10:18    O2 Flow (L/min): 3            -16 @ 07:  -  17 @ 07:00  --------------------------------------------------------  IN: 440 mL / OUT: 350 mL / NET: 90 mL    17 @ 07:01  -  17 @ 14:12  --------------------------------------------------------  IN: 30 mL / OUT: 0 mL / NET: 30 mL      CAPILLARY BLOOD GLUCOSE          PHYSICAL EXAM:    Constitutional: NAD  HEENT: NC/AT; PERRL, anicteric sclera; MMM  Neck: supple, no JVD  Cardiovascular: +S1/S2, RRR  Respiratory: CTA B/L, no W/R/R  Gastrointestinal: abdomen soft, NT/ND; no rebound or guarding; +BSx4  Genitourinary: no suprapubic tenderness or fullness  Extremities: WWP; no LE edema; no clubbing or cyanosis  Vascular: 2+ radial, DP/PT and femoral pulses B/L  Dermatologic: normal color and turgor; no visible rashes  Neurological:     MEDICATIONS:  MEDICATIONS  (STANDING):  ARIPiprazole 2 milliGRAM(s) Oral every 24 hours  cefTRIAXone   IVPB 2000 milliGRAM(s) IV Intermittent every 24 hours  lamoTRIgine 100 milliGRAM(s) Oral every 24 hours  pantoprazole Infusion 8 mG/Hr (10 mL/Hr) IV Continuous <Continuous>  PARoxetine 30 milliGRAM(s) Oral every 24 hours  sucralfate suspension 1 Gram(s) Oral every 6 hours    MEDICATIONS  (PRN):  ondansetron Injectable 4 milliGRAM(s) IV Push every 8 hours PRN Nausea and/or Vomiting      ALLERGIES:  Allergies    lanolin topical (Rash)    Intolerances        LABS:                        7.8    8.75  )-----------( 203      ( 2023 09:08 )             25.2     02-    136  |  103  |  23  ----------------------------<  103<H>  3.8   |  26  |  0.91    Ca    8.7      2023 09:08  Phos  2.8     02-  Mg     2.2     -17    TPro  4.5<L>  /  Alb  2.5<L>  /  TBili  0.2  /  DBili  x   /  AST  15  /  ALT  14  /  AlkPhos  77  02-17    PT/INR - ( 2023 09:08 )   PT: 13.5 sec;   INR: 1.13          PTT - ( 2023 09:08 )  PTT:26.1 sec  Urinalysis Basic - ( 2023 15:41 )    Color: Yellow / Appearance: Clear / S.020 / pH: x  Gluc: x / Ketone: NEGATIVE  / Bili: Negative / Urobili: 0.2 E.U./dL   Blood: x / Protein: 30 mg/dL / Nitrite: POSITIVE   Leuk Esterase: NEGATIVE / RBC: < 5 /HPF / WBC < 5 /HPF   Sq Epi: x / Non Sq Epi: 0-5 /HPF / Bacteria: Present /HPF        RADIOLOGY & ADDITIONAL TESTS: Reviewed. HPI:  77F PMHx of hiatal hernia, esophageal ulcer (diagnosed at [facility] in 2023), HLD, depression (on aripiprazole and paroxetine), and seizure disorder (last seizure 2 years ago, on lamotrigine), presented to [facility] ED after 6 days of dark black emesis and c/f aspiration PNA. Patient endorsed symptoms starting after drinking a glass of wine, but has persisted. Of note, patient presented to [facility] with similar symptoms and hospital stay was prolonged due to weaning off supplemental oxygen. GI was consulted. Patient was intubated for EGD, which found grade D esophagitis, a large hiatal hernia, liquefied food contents in the stomach. Patient was extubated to NRB, SpO2 94%. Attempted to wean to 6LNC, SpO2 84%. ICU was consulted for AHRF.      VITAL SIGNS:  ICU Vital Signs Last 24 Hrs  T(C): 36.9 (2023 10:18), Max: 37.1 (2023 23:02)  T(F): 98.4 (2023 09:00), Max: 98.7 (2023 23:02)  HR: 74 (2023 10:18) (71 - 80)  BP: 102/51 (2023 10:18) (93/60 - 103/60)  BP(mean): 74 (2023 10:18) (74 - 74)  ABP: --  ABP(mean): --  RR: 18 (2023 10:18) (16 - 20)  SpO2: 95% (2023 10:18) (93% - 100%)    O2 Parameters below as of 2023 10:18    O2 Flow (L/min): 3        -16 @ 07:01  -  -17 @ 07:00  --------------------------------------------------------  IN: 440 mL / OUT: 350 mL / NET: 90 mL    -17 @ 07:01  -  17 @ 14:12  --------------------------------------------------------  IN: 30 mL / OUT: 0 mL / NET: 30 mL      CAPILLARY BLOOD GLUCOSE          PHYSICAL EXAM:  Constitutional: NAD, elderly lady  HEENT: NC/AT; PERRL, anicteric sclera; dry mucous membranes  Neck: supple, no JVD  Cardiovascular: +S1/S2, RRR  Respiratory: +bibasilar crackles, more prominent on RLL, on HFNC 50/95  Gastrointestinal: abdomen soft, NT/ND; no rebound or guarding; +BSx4  Genitourinary: no suprapubic tenderness or fullness  Extremities: WWP; no LE edema; no clubbing or cyanosis  Vascular: 2+ radial, DP/PT and femoral pulses B/L  Dermatologic: normal color and turgor; no visible rashes  Neurological: AAOx3, conversive, no focal deficits,     MEDICATIONS:  MEDICATIONS  (STANDING):  ARIPiprazole 2 milliGRAM(s) Oral every 24 hours  cefTRIAXone   IVPB 2000 milliGRAM(s) IV Intermittent every 24 hours  lamoTRIgine 100 milliGRAM(s) Oral every 24 hours  pantoprazole Infusion 8 mG/Hr (10 mL/Hr) IV Continuous <Continuous>  PARoxetine 30 milliGRAM(s) Oral every 24 hours  sucralfate suspension 1 Gram(s) Oral every 6 hours    MEDICATIONS  (PRN):  ondansetron Injectable 4 milliGRAM(s) IV Push every 8 hours PRN Nausea and/or Vomiting      ALLERGIES:  Allergies    lanolin topical (Rash)    Intolerances        LABS:                        7.8    8.75  )-----------( 203      ( 2023 09:08 )             25.2     02-17    136  |  103  |  23  ----------------------------<  103<H>  3.8   |  26  |  0.91    Ca    8.7      2023 09:08  Phos  2.8     02-17  Mg     2.2     02-17    TPro  4.5<L>  /  Alb  2.5<L>  /  TBili  0.2  /  DBili  x   /  AST  15  /  ALT  14  /  AlkPhos  77  02-17    PT/INR - ( 2023 09:08 )   PT: 13.5 sec;   INR: 1.13          PTT - ( 2023 09:08 )  PTT:26.1 sec  Urinalysis Basic - ( 2023 15:41 )    Color: Yellow / Appearance: Clear / S.020 / pH: x  Gluc: x / Ketone: NEGATIVE  / Bili: Negative / Urobili: 0.2 E.U./dL   Blood: x / Protein: 30 mg/dL / Nitrite: POSITIVE   Leuk Esterase: NEGATIVE / RBC: < 5 /HPF / WBC < 5 /HPF   Sq Epi: x / Non Sq Epi: 0-5 /HPF / Bacteria: Present /HPF        RADIOLOGY & ADDITIONAL TESTS: Reviewed.

## 2023-02-17 NOTE — PROGRESS NOTE ADULT - PROBLEM SELECTOR PLAN 5
On home losartan 100 mg QD.  - hold i/s/o hypotension, active UGIB  - restart as clinically indicated

## 2023-02-17 NOTE — CONSULT NOTE ADULT - ASSESSMENT
NEURO  No active issues    PULMONARY  #    CARDIAC  #    GASTROINTESTINAL  #    RENAL  #    INFECTIOUS  #    ENDOCRINE  #No active issues    HEME  #    MISC  Fluids: none  Electrolytes: replete K>3.8, Mg>1.8, Phos>2.5 IV  Diet:   DVT Prophylaxis:   GI Prophylaxis: Pantoprazole 40 mg IV BID  Code Status: Full code  Dispo:  77F PMHx of hiatal hernia, esophageal ulcer (diagnosed at [facility] in 1/2023), HLD, depression (on aripiprazole and paroxetine), and seizure disorder (last seizure 2 years ago, on lamotrigine), presented to [facility] ED after 6 days of dark black emesis and c/f aspiration PNA.    NEURO  #No active issues    PULMONARY  #AHRF  Patient presenting with multiple episodes of dark-black colored emesis with c/f aspiration PNA. Patient required 3LNC on arrival. CXR with LLL consolidation. S/p Unasyn and started on CTX 2g. Patient underwent intubation for EGD, extubated to NRB, unable to wean to 6LNC. Repeat CXR with new RLL consolidation likely 2/2 continued aspiration. Placed on HFNC. Lactate negative. VBG with respiratory alkalosis.   - Transition CTX 2g to Zosyn 4.5g q6 given recent hospitalization and continued aspiration PNA  - Currently on HFNC 50/90, wean O2 as tolerated  - Aspiration precautions    CARDIAC  #HTN  Known history of HTN. Currently, /51, post EGD. On home Losartan 100mg qd   - Hold home losartan as post anesthesia, restart as clinically indicated    #HLD  - c/w home Lipitor 20mg    GASTROINTESTINAL  #Coffee ground emesis   #Esophagitis   Presented with 6 episodes of dark-colored emesis. Known history of esophageal ulceration 01/23 @ [facility], discharged on PPI and Carafate. EGD (02/17) revealed grade D esophagitis large hiatal hernia, liquefied gastric contents, and patchy erythema of the mucosa without active bleeding  - GI consulted, appreciate recs  - Keep NPO  - c/w IV PPI BID  - c/w Carafate suspension 1g PO qd  - Per GI, consider barium swallow with tablet    RENAL  #No active issues    INFECTIOUS  #Aspiration PNA  Likely 2/2 multiple episodes of dark-colored emesis. Patient was recently admitted to [facility] 1 month ago with a similar event, found to have esophagitis on EGD. Repeat CXR with LLL and RLL consolidation. S/p Unasyn and CTX 2g x1. Procal 0.20. Lactate negative.  - Transition CTX 2g to Zosyn 4.5g q6 given recent hospitalization and continued aspiration PNA  - f/u sputum Cx, BCx  - speech and swallow evaluation    ENDOCRINE  #No active issues    HEME  #Anemia  Hgb on admission 9.0, unknown baseline. MCV 94. Presented with episodes of dark-colored emesis likely 2/2 known esophageal ulcer/esophagitis.  - trend CBC  - maintain active T&S  - transfuse if Hgb <7     MISC  Fluids: None  Electrolytes: replete K>3.8, Mg>1.8, Phos>2.5 IV  Diet: NPO  DVT Prophylaxis: Hold PPx  GI Prophylaxis: Pantoprazole 40 mg IV BID  Code Status: DNR/DNI  Dispo: 7LACH 77F PMHx of hiatal hernia, esophageal ulcer (diagnosed at [facility] in 1/2023), HLD, depression (on aripiprazole and paroxetine), and seizure disorder (last seizure 2 years ago, on lamotrigine), presented to [facility] ED after 6 days of dark black emesis and c/f aspiration PNA.    NEURO  #No active issues    PULMONARY  #AHRF  Patient presenting with multiple episodes of dark-black colored emesis with c/f aspiration PNA. Patient required 3LNC on arrival. CXR with LLL consolidation. S/p Unasyn and started on CTX 2g. Patient underwent intubation for EGD, extubated to NRB, unable to wean to 6LNC. Repeat CXR with new RLL consolidation likely 2/2 continued aspiration. Placed on HFNC. Lactate negative. VBG with respiratory alkalosis.   - Transition CTX 2g to Zosyn 4.5g q6 given recent hospitalization and continued aspiration PNA  - Currently on HFNC 50/90, wean O2 as tolerated  - Obtain ABG   - Aspiration precautions    CARDIAC  #HTN  Known history of HTN. Currently, /51, post EGD. On home Losartan 100mg qd   - Hold home losartan as post anesthesia, restart as clinically indicated    #HLD  - c/w home Lipitor 20mg    GASTROINTESTINAL  #Coffee ground emesis   #Esophagitis   Presented with 6 episodes of dark-colored emesis. Known history of esophageal ulceration 01/23 @ [facility], discharged on PPI and Carafate. EGD (02/17) revealed grade D esophagitis large hiatal hernia, liquefied gastric contents, and patchy erythema of the mucosa without active bleeding  - GI consulted, appreciate recs  - Keep NPO  - c/w IV PPI BID  - c/w Carafate suspension 1g PO qd  - Per GI, consider barium swallow with tablet    RENAL  #No active issues    INFECTIOUS  #Aspiration PNA  Likely 2/2 multiple episodes of dark-colored emesis. Patient was recently admitted to [facility] 1 month ago with a similar event, found to have esophagitis on EGD. Repeat CXR with LLL and RLL consolidation. S/p Unasyn and CTX 2g x1. Procal 0.20. Lactate negative.  - Transition CTX 2g to Zosyn 4.5g q6 given recent hospitalization and continued aspiration PNA  - f/u sputum Cx, BCx  - speech and swallow evaluation    ENDOCRINE  #No active issues    HEME  #Anemia  Hgb on admission 9.0, unknown baseline. MCV 94. Presented with episodes of dark-colored emesis likely 2/2 known esophageal ulcer/esophagitis.  - trend CBC  - maintain active T&S  - transfuse if Hgb <7     MISC  #CODE STATUS  Patient endorsed wanting DNR/DNI on admission, however per daughter (not HCP), would like patient to be full code. Discussed patient was deemed to have capacity on admission (MOLST filled). If patient wishes to rescind DNR/DNI, would need to assess for capacity as patient was given procedural sedation.  - Recommend Palliative Consult    Fluids: None  Electrolytes: replete K>3.8, Mg>1.8, Phos>2.5 IV  Diet: NPO  DVT Prophylaxis: Hold PPx  GI Prophylaxis: Pantoprazole 40 mg IV BID  Dispo: 7LACH

## 2023-02-17 NOTE — CONSULT NOTE ADULT - SUBJECTIVE AND OBJECTIVE BOX
Patient is a 77y old  Female who presents with a chief complaint of Aspiration PNA, hematemesis w/ known esophageal ulcer (2023 08:02)        HPI:  78 y/o F PMH of hiatal hernia, esophageal ulcer (diagnosed at [facility] in 2023), HLD, depression (on aripiprazole and paroxetine), and seizure disorder (last seizure 2 years ago, on lamotrigine), p/w 6 days of dark black emesis after drinking a glass of wine last Friday. Pt was recently evaluated at [facility] for similar symptoms, found to have an esophageal ulcer on EGD, and was discharged on carafate and protonix.  She has since had daily episodes of dark black emesis, dry cough, and hoarseness of her voice. She also c/o decreased appetite and general malaise. She denies any abdominal pain, shortness of breath, diarrhea, chest pain, palpitations, headaches, dizziness, dysuria, constipation, melena, or hematochezia.     In the ED:  Initial vital signs: T: 96.9 F, HR: 84, BP: 87/62--> 110/56 s/p, R: 18, SpO2: 93% on RA--> 100% on 3L  Labs: WBC 11.43, Hgb 9.0, MCV 94.7 Neutrophils 10.33, lactate 2.5-->0.9, K 3.3, BUN 44, Cr 1.77, pH 7.28  CXR: Left lung consolidation  Medications: unasyn x1, protonix IV x1, 2.5 L NS     (2023 17:07)      PAST MEDICAL & SURGICAL HISTORY:  Hiatal hernia      High cholesterol      History of esophageal ulcer      HTN (hypertension)      Major depression      Seizure disorder      Anxiety      No significant past surgical history          MEDICATIONS  (STANDING):  ARIPiprazole 2 milliGRAM(s) Oral every 24 hours  cefTRIAXone   IVPB 2000 milliGRAM(s) IV Intermittent every 24 hours  lamoTRIgine 100 milliGRAM(s) Oral every 24 hours  pantoprazole Infusion 8 mG/Hr (10 mL/Hr) IV Continuous <Continuous>  PARoxetine 30 milliGRAM(s) Oral every 24 hours  sucralfate suspension 1 Gram(s) Oral every 6 hours    MEDICATIONS  (PRN):  ondansetron Injectable 4 milliGRAM(s) IV Push every 8 hours PRN Nausea and/or Vomiting           FAMILY HISTORY:  No pertinent family history in first degree relatives      CBC Full  -  ( 2023 22:42 )  WBC Count : 9.01 K/uL  RBC Count : 2.57 M/uL  Hemoglobin : 7.9 g/dL  Hematocrit : 24.7 %  Platelet Count - Automated : 225 K/uL  Mean Cell Volume : 96.1 fl  Mean Cell Hemoglobin : 30.7 pg  Mean Cell Hemoglobin Concentration : 32.0 gm/dL  Auto Neutrophil # : 7.40 K/uL  Auto Lymphocyte # : 0.44 K/uL  Auto Monocyte # : 0.69 K/uL  Auto Eosinophil # : 0.42 K/uL  Auto Basophil # : 0.04 K/uL  Auto Neutrophil % : 82.1 %  Auto Lymphocyte % : 4.9 %  Auto Monocyte % : 7.7 %  Auto Eosinophil % : 4.7 %  Auto Basophil % : 0.4 %      -    136  |  102  |  32<H>  ----------------------------<  104<H>  3.0<L>   |  26  |  1.14    Ca    8.5      2023 22:42  Phos  3.6     02-16  Mg     1.6     -    TPro  4.6<L>  /  Alb  2.3<L>  /  TBili  0.2  /  DBili  x   /  AST  15  /  ALT  14  /  AlkPhos  64  02-16      Urinalysis Basic - ( 2023 15:41 )    Color: Yellow / Appearance: Clear / S.020 / pH: x  Gluc: x / Ketone: NEGATIVE  / Bili: Negative / Urobili: 0.2 E.U./dL   Blood: x / Protein: 30 mg/dL / Nitrite: POSITIVE   Leuk Esterase: NEGATIVE / RBC: < 5 /HPF / WBC < 5 /HPF   Sq Epi: x / Non Sq Epi: 0-5 /HPF / Bacteria: Present /HPF          Radiology :     < from: Xray Chest 1 View- PORTABLE-Urgent (Xray Chest 1 View- PORTABLE-Urgent .) (23 @ 10:54) >  ACC: [id number] EXAM:  XR CHEST PORTABLE URGENT 1V   ORDERED BY: GALE VARGAS     PROCEDURE DATE:  2023          INTERPRETATION:  TECHNIQUE: Single portable view of the chest.    COMPARISON:  None    CLINICAL HISTORY: sob    FINDINGS:    Single frontal view of the chest demonstrates left hilar and left lower   lobe infiltrates. The cardiomediastinal silhouette is enlarged. Large   hiatal hernia.. No acute osseous abnormalities. Overlying EKG leads and   wires are noted    IMPRESSION: Left lung infiltrates. Cardiomegaly.          Vital Signs Last 24 Hrs  T(C): 36.7 (2023 05:30), Max: 37.1 (2023 23:02)  T(F): 98 (2023 05:30), Max: 98.7 (2023 23:02)  HR: 71 (2023 05:30) (71 - 87)  BP: 95/52 (2023 05:30) (87/62 - 110/56)  BP(mean): 74 (2023 23:02) (74 - 74)  RR: 18 (2023 05:30) (16 - 20)  SpO2: 100% (2023 05:30) (92% - 100%)    Parameters below as of 2023 05:30  Patient On (Oxygen Delivery Method): nasal cannula  O2 Flow (L/min): 3          REVIEW OF SYSTEMS:  per hpi            Physical Exam :  77 y o woman lying comfortably in semi Cruz's position , awake , alert , no new complaints     Head : normocephalic , atraumatic    Eyes : PERRLA , EOMI , no nystagmus , sclera anicteric    ENT : nasal discharge , uvula midline , no oropharyngeal erythema / exudate    Neck : supple , negative JVD , negative carotid bruits , no thyromegaly    Chest : CTA bilaterally , neg wheeze / rhonchi / rales / crackles / egophany    Cardiovascular : regular rate and rhythm , neg murmurs / rubs / gallops    Abdomen : soft , non distended , non tender to palpation in all 4 quadrants , normal bowel sounds , negative rebound / guarding     Extremities : trace pedal edema     Neurologic Exam :    Alert and oriented  x 3     Motor Exam:          Right UE:               no focal weakness ,  > 3+/5 throughout                                 Left UE:                 no focal weakness ,  > 3+/5 throughout        Right LE:                no focal weakness ,  > 3+/5 throughout       Left LE:                  no focal weakness ,  > 3+/5 throughout         Sensation :         intact to light touch x 4 extremities       DTR :                     biceps/brachioradialis : equal                                              patella/ankle : equal   Gait :  not tested        PM&R Impression : admitted for UGIB and aspiration PNA    Recommendations / Plan :     1) Physical / Occupational therapy focusing on therapeutic exercises , equipment evaluation , bed mobility/transfer out of bed evaluation , progressive ambulation with assistive devices prn .    2) Current disposition plan recommendation  :    pending functional progress

## 2023-02-18 LAB
ALBUMIN SERPL ELPH-MCNC: 2.5 G/DL — LOW (ref 3.3–5)
ALP SERPL-CCNC: 71 U/L — SIGNIFICANT CHANGE UP (ref 40–120)
ALT FLD-CCNC: 14 U/L — SIGNIFICANT CHANGE UP (ref 10–45)
ANION GAP SERPL CALC-SCNC: 8 MMOL/L — SIGNIFICANT CHANGE UP (ref 5–17)
AST SERPL-CCNC: 10 U/L — SIGNIFICANT CHANGE UP (ref 10–40)
BILIRUB SERPL-MCNC: 0.3 MG/DL — SIGNIFICANT CHANGE UP (ref 0.2–1.2)
BUN SERPL-MCNC: 13 MG/DL — SIGNIFICANT CHANGE UP (ref 7–23)
CALCIUM SERPL-MCNC: 9.1 MG/DL — SIGNIFICANT CHANGE UP (ref 8.4–10.5)
CHLORIDE SERPL-SCNC: 105 MMOL/L — SIGNIFICANT CHANGE UP (ref 96–108)
CO2 SERPL-SCNC: 29 MMOL/L — SIGNIFICANT CHANGE UP (ref 22–31)
CREAT SERPL-MCNC: 0.73 MG/DL — SIGNIFICANT CHANGE UP (ref 0.5–1.3)
EGFR: 84 ML/MIN/1.73M2 — SIGNIFICANT CHANGE UP
GLUCOSE SERPL-MCNC: 135 MG/DL — HIGH (ref 70–99)
HCT VFR BLD CALC: 26 % — LOW (ref 34.5–45)
HGB BLD-MCNC: 8 G/DL — LOW (ref 11.5–15.5)
MAGNESIUM SERPL-MCNC: 1.9 MG/DL — SIGNIFICANT CHANGE UP (ref 1.6–2.6)
MCHC RBC-ENTMCNC: 29.5 PG — SIGNIFICANT CHANGE UP (ref 27–34)
MCHC RBC-ENTMCNC: 30.8 GM/DL — LOW (ref 32–36)
MCV RBC AUTO: 95.9 FL — SIGNIFICANT CHANGE UP (ref 80–100)
NRBC # BLD: 0 /100 WBCS — SIGNIFICANT CHANGE UP (ref 0–0)
PHOSPHATE SERPL-MCNC: 3.2 MG/DL — SIGNIFICANT CHANGE UP (ref 2.5–4.5)
PLATELET # BLD AUTO: 235 K/UL — SIGNIFICANT CHANGE UP (ref 150–400)
POTASSIUM SERPL-MCNC: 3.7 MMOL/L — SIGNIFICANT CHANGE UP (ref 3.5–5.3)
POTASSIUM SERPL-SCNC: 3.7 MMOL/L — SIGNIFICANT CHANGE UP (ref 3.5–5.3)
PROT SERPL-MCNC: 5.1 G/DL — LOW (ref 6–8.3)
RBC # BLD: 2.71 M/UL — LOW (ref 3.8–5.2)
RBC # FLD: 14.3 % — SIGNIFICANT CHANGE UP (ref 10.3–14.5)
SODIUM SERPL-SCNC: 142 MMOL/L — SIGNIFICANT CHANGE UP (ref 135–145)
WBC # BLD: 11.91 K/UL — HIGH (ref 3.8–10.5)
WBC # FLD AUTO: 11.91 K/UL — HIGH (ref 3.8–10.5)

## 2023-02-18 PROCEDURE — 99233 SBSQ HOSP IP/OBS HIGH 50: CPT | Mod: GC

## 2023-02-18 PROCEDURE — 99232 SBSQ HOSP IP/OBS MODERATE 35: CPT

## 2023-02-18 RX ORDER — PANTOPRAZOLE SODIUM 20 MG/1
40 TABLET, DELAYED RELEASE ORAL EVERY 12 HOURS
Refills: 0 | Status: DISCONTINUED | OUTPATIENT
Start: 2023-02-18 | End: 2023-02-28

## 2023-02-18 RX ADMIN — Medication 1 GRAM(S): at 17:59

## 2023-02-18 RX ADMIN — LAMOTRIGINE 100 MILLIGRAM(S): 25 TABLET, ORALLY DISINTEGRATING ORAL at 10:54

## 2023-02-18 RX ADMIN — PIPERACILLIN AND TAZOBACTAM 25 GRAM(S): 4; .5 INJECTION, POWDER, LYOPHILIZED, FOR SOLUTION INTRAVENOUS at 21:53

## 2023-02-18 RX ADMIN — PIPERACILLIN AND TAZOBACTAM 25 GRAM(S): 4; .5 INJECTION, POWDER, LYOPHILIZED, FOR SOLUTION INTRAVENOUS at 14:16

## 2023-02-18 RX ADMIN — Medication 30 MILLIGRAM(S): at 10:54

## 2023-02-18 RX ADMIN — ARIPIPRAZOLE 2 MILLIGRAM(S): 15 TABLET ORAL at 14:15

## 2023-02-18 RX ADMIN — Medication 1 GRAM(S): at 14:15

## 2023-02-18 RX ADMIN — PIPERACILLIN AND TAZOBACTAM 25 GRAM(S): 4; .5 INJECTION, POWDER, LYOPHILIZED, FOR SOLUTION INTRAVENOUS at 02:01

## 2023-02-18 RX ADMIN — PANTOPRAZOLE SODIUM 40 MILLIGRAM(S): 20 TABLET, DELAYED RELEASE ORAL at 06:33

## 2023-02-18 RX ADMIN — Medication 1 GRAM(S): at 06:33

## 2023-02-18 RX ADMIN — PANTOPRAZOLE SODIUM 40 MILLIGRAM(S): 20 TABLET, DELAYED RELEASE ORAL at 17:59

## 2023-02-18 RX ADMIN — Medication 1 GRAM(S): at 00:15

## 2023-02-18 NOTE — SWALLOW BEDSIDE ASSESSMENT ADULT - SLP GENERAL OBSERVATIONS
Awake in bed, A&Ox3, receiving supplemental O2 via HFNC, voice is mildly dysphonic. Endorsed increased coughing with PO since hiatal hernia dx.

## 2023-02-18 NOTE — PROGRESS NOTE ADULT - ASSESSMENT
per Internal Medicine    77 y o F w/ hx of hiatal hernia and esophageal ulcer, presenting with 6 days of dark black emesis, hoarseness, and anorexia, admitted for UGIB and aspiration PNA.       Problem/Plan - 1:  ·  Problem: Esophageal ulcer.   ·  Plan: #GIB  #Coffee-ground emesis  Pt w/ known hx of esophageal ulcer, diagnosed at [facility] in 1/2023, discharged on carafate and protonix with surgery f/u for hiatal hernia repair, now p/w 6 days of dark black emesis after drinking a glass of wine. Pt reports daily episodes of emesis, and has since developed a cough and hoarseness of her voice. Found to have mild leukocytosis, neutrophilic predominance, and lactate of 2.5 w/ left lobe infiltrate on CXR. Denies any bright red blood in her vomit, no abdominal pain, diarrhea, hematochezia, or melena. Denies any dizziness or lightheadedness.  Endoscopy findings:   A large size paraesophageal hiatal hernia was seen, the esophagogastric junction(EGJ) was noted at 40 cm, with hiatal narrowing at 34 cm from the incisors. Additional findings include ulceration. Retroflexion view in the stomach confirmed the size and morphology of the hernia.  -Grade D esophagitis with no bleeding was seen starting at 30 cm from the incisors in the lower third of the esophagus and middle third of the esophagus, compatible with nonspecific erosive esophagitis. This is likely the source of coffee ground emesis  -Localized irregularity of the mucosa was noted in the Z-line and gastroesophageal junction.  -Liquefied food material was found in the stomach. Findings were suggestive of gastroparesis/delayed gastric emptying.  -Patchy erythema of the mucosa with no bleeding was noted in the stomach body. These findings are compatible with non-erosive gastritis. Cold forceps biopsies were performed for H. pylori in the stomach body and stomach antrum.  -Normal appearing duodenum  Recs:   - NPO for 4 hrs, then if well, clear diet and advance as tolerated    - Please given reglan 10 mg IV  x1 as prokinetic if no contraindications  - Pantoprazole 40 mg PO BID    - Carafate Suspension 1g po qid     - Barium Swallow with tablet recommended  - protonix 40mg q12h  - carafate 1g oral sln q6hrs.    Problem/Plan - 2:  ·  Problem: Aspiration pneumonia of left lung.   ·  Plan: Pt w/ worsening cough and voice hoarseness i/s/o daily emesis, found to have mild leukocytosis, neutrophilic predominance, and lactate of 2.5 w/ left lobe infiltrate on CXR. Initially hypotensive on presentation to ED 80s/60s. now improved after 2.5 L NS. Likely aspiration PNA 2/2 intractable vomiting vs CAP. s/p 1 dose of unasyn in the ED, started on ceftx 2g q24 which was transitioned to Zosyn with pseudomonal coverage due to concern for aspiration pneumonia post endoscopy. Currently patient on HFNC 60L/100%    - Zosyn 4.5 q4hrs   - F/u sputum culture   - blood culture NGTD from 2/16  - Aspiration precautions  - Wean O2 as tolerated  - incentive spirometry  - OOBTC.    Problem/Plan - 3:  ·  Problem: Anemia.   ·  Plan: Hgb 9.0 unknown baseline,  MCV 94 2/2 to active hematemesis i/s/o known esophageal ulcer    - Obtain iron studies   - Trend CBC  - Maintain active T&S (order for 2/18)  - transfuse if Hgb <7.    Problem/Plan - 4:  ·  Problem: Anxiety and depression.   ·  Plan: On home abilify 2 mg QD, paxil 40 qd, and lorazepam 1g PRN.  - C/w abilify and paxil  - Hold lorazepam.    Problem/Plan - 5:  ·  Problem: HTN (hypertension).   ·  Plan: On home losartan 100 mg QD.  - hold i/s/o hypotension, active UGIB  - restart as clinically indicated.    Problem/Plan - 6:  ·  Problem: HLD (hyperlipidemia).   ·  Plan: On home atorvastatin 20 mg  - c/w home meds.    Problem/Plan - 7:  ·  Problem: Prophylactic measure.   ·  Plan: F: none  E: replete K<4, Mg<2  N: NPO for 4 hours - will resume per GI recs   VTE Prophylaxis: hold i/s/o active UGIB  GI: protonix ggt  C: DNR/DNI (new molst in chart)  D: RMF.

## 2023-02-18 NOTE — PROGRESS NOTE ADULT - SUBJECTIVE AND OBJECTIVE BOX
OVERNIGHT EVENTS:  mayra    SUBJECTIVE / INTERVAL HPI: Patient seen and examined at bedside.  Comfortable in bed endorsing no distress.    VITAL SIGNS:  Vital Signs Last 24 Hrs  T(C): 36.7 (2023 14:41), Max: 37 (2023 04:52)  T(F): 98 (2023 14:41), Max: 98.6 (2023 04:52)  HR: 83 (2023 12:) (58 - 83)  BP: 97/55 (2023 12:) (89/54 - 128/60)  BP(mean): 73 (2023 12:) (68 - 86)  RR: 29 (:) (15 - 29)  SpO2: 97% (:) (89% - 99%)    Parameters below as of 2023 12:26  Patient On (Oxygen Delivery Method): nasal cannula, high flow  O2 Flow (L/min): 50  O2 Concentration (%): 50  I&O's Summary    2023 07:01  -  2023 07:00  --------------------------------------------------------  IN: 30 mL / OUT: 0 mL / NET: 30 mL    2023 07:01  -  2023 14:57  --------------------------------------------------------  IN: 475 mL / OUT: 600 mL / NET: -125 mL      PHYSICAL EXAM:  General: In bed in no acute distress   HEENT: NCAT; PERRL, anicteric sclera; MMM  Neck: supple, trachea midline  Cardiovascular: S1, S2 normal; RRR, no M/G/R  Respiratory: +bibasilar crackles, more prominent on RLL, on HFNC 50/95  Gastrointestinal: abdomen soft, NT/ND; no rebound or guarding; +BSx4  Skin: no ulcerations or visible rashes appreciated  Extremities: WWP; no edema, clubbing or cyanosis  Vascular: 2+ radial, DP/PT pulses B/L  Neurological: AAOx3; CN II-XII grossly intact; no focal deficits    MEDICATIONS:  MEDICATIONS  (STANDING):  ARIPiprazole 2 milliGRAM(s) Oral every 24 hours  lamoTRIgine 100 milliGRAM(s) Oral every 24 hours  pantoprazole  Injectable 40 milliGRAM(s) IV Push every 12 hours  PARoxetine 30 milliGRAM(s) Oral every 24 hours  piperacillin/tazobactam IVPB.. 4.5 Gram(s) IV Intermittent every 8 hours  sucralfate suspension 1 Gram(s) Oral every 6 hours    MEDICATIONS  (PRN):  ondansetron Injectable 4 milliGRAM(s) IV Push every 8 hours PRN Nausea and/or Vomiting      ALLERGIES:  Allergies    lanolin topical (Rash)    Intolerances        LABS:                        8.0    11.91 )-----------( 235      ( 2023 06:07 )             26.0     02-18    142  |  105  |  13  ----------------------------<  135<H>  3.7   |  29  |  0.73    Ca    9.1      2023 06:07  Phos  3.2     02-18  Mg     1.9     18    TPro  5.1<L>  /  Alb  2.5<L>  /  TBili  0.3  /  DBili  x   /  AST  10  /  ALT  14  /  AlkPhos  71  -18    PT/INR - ( 2023 14:33 )   PT: 13.5 sec;   INR: 1.13          PTT - ( 2023 14:33 )  PTT:26.0 sec  Urinalysis Basic - ( 2023 15:41 )    Color: Yellow / Appearance: Clear / S.020 / pH: x  Gluc: x / Ketone: NEGATIVE  / Bili: Negative / Urobili: 0.2 E.U./dL   Blood: x / Protein: 30 mg/dL / Nitrite: POSITIVE   Leuk Esterase: NEGATIVE / RBC: < 5 /HPF / WBC < 5 /HPF   Sq Epi: x / Non Sq Epi: 0-5 /HPF / Bacteria: Present /HPF      CAPILLARY BLOOD GLUCOSE          RADIOLOGY & ADDITIONAL TESTS: Reviewed.

## 2023-02-18 NOTE — PROGRESS NOTE ADULT - SUBJECTIVE AND OBJECTIVE BOX
Physical Medicine and Rehabilitation Progress Note :       Patient is a 77y old  Female who presents with a chief complaint of Aspiration PNA, hematemesis w/ known esophageal ulcer (18 Feb 2023 09:42)      HPI:  78 y/o F PMH of hiatal hernia, esophageal ulcer (diagnosed at [facility] in 1/2023), HLD, depression (on aripiprazole and paroxetine), and seizure disorder (last seizure 2 years ago, on lamotrigine), p/w 6 days of dark black emesis after drinking a glass of wine last Friday. Pt was recently evaluated at [facility] for similar symptoms, found to have an esophageal ulcer on EGD, and was discharged on carafate and protonix.  She has since had daily episodes of dark black emesis, dry cough, and hoarseness of her voice. She also c/o decreased appetite and general malaise. She denies any abdominal pain, shortness of breath, diarrhea, chest pain, palpitations, headaches, dizziness, dysuria, constipation, melena, or hematochezia.     In the ED:  Initial vital signs: T: 96.9 F, HR: 84, BP: 87/62--> 110/56 s/p, R: 18, SpO2: 93% on RA--> 100% on 3L  Labs: WBC 11.43, Hgb 9.0, MCV 94.7 Neutrophils 10.33, lactate 2.5-->0.9, K 3.3, BUN 44, Cr 1.77, pH 7.28  CXR: Left lung consolidation  Medications: unasyn x1, protonix IV x1, 2.5 L NS     (16 Feb 2023 17:07)                            8.0    11.91 )-----------( 235      ( 18 Feb 2023 06:07 )             26.0       02-18    142  |  105  |  13  ----------------------------<  135<H>  3.7   |  29  |  0.73    Ca    9.1      18 Feb 2023 06:07  Phos  3.2     02-18  Mg     1.9     02-18    TPro  5.1<L>  /  Alb  2.5<L>  /  TBili  0.3  /  DBili  x   /  AST  10  /  ALT  14  /  AlkPhos  71  02-18    Vital Signs Last 24 Hrs  T(C): 36.4 (18 Feb 2023 10:54), Max: 37 (18 Feb 2023 04:52)  T(F): 97.6 (18 Feb 2023 10:54), Max: 98.6 (18 Feb 2023 04:52)  HR: 83 (18 Feb 2023 12:26) (58 - 83)  BP: 97/55 (18 Feb 2023 12:26) (89/54 - 128/60)  BP(mean): 73 (18 Feb 2023 12:26) (68 - 86)  RR: 29 (18 Feb 2023 12:26) (15 - 29)  SpO2: 97% (18 Feb 2023 12:26) (89% - 99%)    Parameters below as of 18 Feb 2023 12:26  Patient On (Oxygen Delivery Method): nasal cannula, high flow  O2 Flow (L/min): 50  O2 Concentration (%): 50    MEDICATIONS  (STANDING):  ARIPiprazole 2 milliGRAM(s) Oral every 24 hours  lamoTRIgine 100 milliGRAM(s) Oral every 24 hours  pantoprazole  Injectable 40 milliGRAM(s) IV Push every 12 hours  PARoxetine 30 milliGRAM(s) Oral every 24 hours  piperacillin/tazobactam IVPB.. 4.5 Gram(s) IV Intermittent every 8 hours  sucralfate suspension 1 Gram(s) Oral every 6 hours    MEDICATIONS  (PRN):  ondansetron Injectable 4 milliGRAM(s) IV Push every 8 hours PRN Nausea and/or Vomiting        Initial Physical Therapy Functional Status Assessment :     Previous Level of Function:     · Ambulation Skills	independent; needs device  · Transfer Skills	independent; needs device  · ADL Skills	independent; needs device  · Work/Leisure Activity	needed assist; needs device  · Additional Comments	Patient lives with a roommate in an elevator building. Has a HHA 3 days a week for about 4 hours a day. Ambulates with a cane indoors and with a rollator with her HHA outdoors.    Cognitive Status Examination:   · Orientation	oriented to person, place, time and situation  · Level of Consciousness	alert  · Follows Commands and Answers Questions	100% of the time    Peripheral Neurovascular:     Neurovascular Assessment:   · LUE	warm  · RUE	warm  · LLE	warm  · RLE	warm    Range of Motion Exam:   · Active Range of Motion Examination	bilateral upper extremity Active ROM was WFL (within functional limits); bilateral  lower extremity Active ROM was WFL (within functional limits)    Manual Muscle Testing:   · Manual Muscle Testing Results	no strength deficits were identified     Bed Mobility: Sit to Supine:     · Level of [place]	NT: Left in a chair    Bed Mobility: Supine to Sit:     · Level of [place]	NT: Received in a chair    Transfer: Sit to Stand:     · Level of [place]	maximum assist (25% patients effort)  · Physical Assist/Nonphysical Assist	1 person assist; verbal cues  · Weight-Bearing Restrictions	weight-bearing as tolerated    Transfer: Stand to Sit:     · Level of [place]	maximum assist (25% patients effort)  · Physical Assist/Nonphysical Assist	1 person assist; verbal cues  · Weight-Bearing Restrictions	weight-bearing as tolerated    Sit/Stand Transfer Safety Analysis:     · Impairments Contributing to Impaired Transfers	impaired balance    Gait Skills:     · Level of [place]	declined to attempt    Balance Skills Assessment:     · Sitting Balance: Static	good balance   · Sitting Balance: Dynamic	good balance   · Sit-to-Stand Balance	poor minus   · Standing Balance: Static	poor minus     Sensory Examination:   Sensory Examination:    Grossly Intact:   · Gross Sensory Examination	Grossly Intact; Left UE; Right UE; Left LE; Right LE      Clinical Impressions:   · Criteria for Skilled Therapeutic Interventions	impairments found; rehab potential; therapy frequency; anticipated discharge recommendation  · Impairments Found (describe specific impairments)	gait, locomotion, and balance      PM&R Impression : as above    Current Disposition Plan Recommendations :    subacute rehab placement

## 2023-02-18 NOTE — SWALLOW BEDSIDE ASSESSMENT ADULT - SWALLOW EVAL: DIAGNOSIS
Given pt reports, EGD findings, and delayed coughing episodes near the end of study, suspect primary esophageal dysphagia as potential etiology of suspected aspiration PNA. Pt appears to be safe to resume a diet of soft and bite sized solids and thin liquids. However, recommend strict asp. precautions and GERD precautions given increased O2 requirements and esophageal findings.

## 2023-02-18 NOTE — PROGRESS NOTE ADULT - PROBLEM SELECTOR PLAN 1
#GIB  #Coffee-ground emesis  Pt w/ known hx of esophageal ulcer, diagnosed at [facility] in 1/2023, discharged on carafate and protonix with surgery f/u for hiatal hernia repair, now p/w 6 days of dark black emesis after drinking a glass of wine. Pt reports daily episodes of emesis, and has since developed a cough and hoarseness of her voice. Found to have mild leukocytosis, neutrophilic predominance, and lactate of 2.5 w/ left lobe infiltrate on CXR. Denies any bright red blood in her vomit, no abdominal pain, diarrhea, hematochezia, or melena. Denies any dizziness or lightheadedness.  Endoscopy findings:   A large size paraesophageal hiatal hernia was seen, the esophagogastric junction(EGJ) was noted at 40 cm, with hiatal narrowing at 34 cm from the incisors. Additional findings include ulceration. Retroflexion view in the stomach confirmed the size and morphology of the hernia.  -Grade D esophagitis with no bleeding was seen starting at 30 cm from the incisors in the lower third of the esophagus and middle third of the esophagus, compatible with nonspecific erosive esophagitis. This is likely the source of coffee ground emesis  -Localized irregularity of the mucosa was noted in the Z-line and gastroesophageal junction.  -Liquefied food material was found in the stomach. Findings were suggestive of gastroparesis/delayed gastric emptying.  -Patchy erythema of the mucosa with no bleeding was noted in the stomach body. These findings are compatible with non-erosive gastritis. Cold forceps biopsies were performed for H. pylori in the stomach body and stomach antrum.  -Normal appearing duodenum  Recs:   - NPO for 4 hrs, then if well, clear diet and advance as tolerated    - Please given reglan 10 mg IV  x1 as prokinetic if no contraindications  - Pantoprazole 40 mg PO BID    - Carafate Suspension 1g po qid     - Barium Swallow with tablet recommended  - protonix 40mg q12h  - carafate 1g oral sln q6hrs #GIB  #Coffee-ground emesis  Pt w/ known hx of esophageal ulcer, diagnosed at [facility] in 1/2023, discharged on carafate and protonix with surgery f/u for hiatal hernia repair, now p/w 6 days of dark black emesis after drinking a glass of wine. Pt reports daily episodes of emesis, and has since developed a cough and hoarseness of her voice. Found to have mild leukocytosis, neutrophilic predominance, and lactate of 2.5 w/ left lobe infiltrate on CXR. Denies any bright red blood in her vomit, no abdominal pain, diarrhea, hematochezia, or melena. Denies any dizziness or lightheadedness.  Endoscopy findings:   A large size paraesophageal hiatal hernia was seen, the esophagogastric junction(EGJ) was noted at 40 cm, with hiatal narrowing at 34 cm from the incisors. Additional findings include ulceration. Retroflexion view in the stomach confirmed the size and morphology of the hernia.  -Grade D esophagitis with no bleeding was seen starting at 30 cm from the incisors in the lower third of the esophagus and middle third of the esophagus, compatible with nonspecific erosive esophagitis. This is likely the source of coffee ground emesis  -Localized irregularity of the mucosa was noted in the Z-line and gastroesophageal junction.  -Liquefied food material was found in the stomach. Findings were suggestive of gastroparesis/delayed gastric emptying.  -Patchy erythema of the mucosa with no bleeding was noted in the stomach body. These findings are compatible with non-erosive gastritis. Cold forceps biopsies were performed for H. pylori in the stomach body and stomach antrum.  -Normal appearing duodenum  Recs:   - Started on Liquid Diet (2/18 am) advanced to Regular Diet (2/18 pm)   - Please given reglan 10 mg IV  x1 as prokinetic if no contraindications  - Pantoprazole 40 mg PO BID    - Carafate Suspension 1g po qid     - Barium Swallow with tablet recommended  - protonix 40mg q12h  - carafate 1g oral sln q6hrs #GIB  #Coffee-ground emesis  Pt w/ known hx of esophageal ulcer, diagnosed at [facility] in 1/2023, discharged on carafate and protonix with surgery f/u for hiatal hernia repair, now p/w 6 days of dark black emesis after drinking a glass of wine. Pt reports daily episodes of emesis, and has since developed a cough and hoarseness of her voice. Found to have mild leukocytosis, neutrophilic predominance, and lactate of 2.5 w/ left lobe infiltrate on CXR. Denies any bright red blood in her vomit, no abdominal pain, diarrhea, hematochezia, or melena. Denies any dizziness or lightheadedness.  Endoscopy findings:   A large size paraesophageal hiatal hernia was seen, the esophagogastric junction(EGJ) was noted at 40 cm, with hiatal narrowing at 34 cm from the incisors. Additional findings include ulceration. Retroflexion view in the stomach confirmed the size and morphology of the hernia.  -Grade D esophagitis with no bleeding was seen starting at 30 cm from the incisors in the lower third of the esophagus and middle third of the esophagus, compatible with nonspecific erosive esophagitis. This is likely the source of coffee ground emesis  -Localized irregularity of the mucosa was noted in the Z-line and gastroesophageal junction.  -Liquefied food material was found in the stomach. Findings were suggestive of gastroparesis/delayed gastric emptying.  -Patchy erythema of the mucosa with no bleeding was noted in the stomach body. These findings are compatible with non-erosive gastritis. Cold forceps biopsies were performed for H. pylori in the stomach body and stomach antrum.  -Normal appearing duodenum  Recs:   - Started on Liquid Diet (2/18 am) advanced to Regular Diet (2/18 pm)   - Pantoprazole 40 mg PO BID    - Carafate Suspension 1g po qid     - Barium Swallow with tablet recommended  - protonix 40mg q12h  - carafate 1g oral sln q6hrs

## 2023-02-18 NOTE — PROGRESS NOTE ADULT - PROBLEM SELECTOR PLAN 2
Pt w/ worsening cough and voice hoarseness i/s/o daily emesis, found to have mild leukocytosis, neutrophilic predominance, and lactate of 2.5 w/ left lobe infiltrate on CXR. Initially hypotensive on presentation to ED 80s/60s. now improved after 2.5 L NS. Likely aspiration PNA 2/2 intractable vomiting vs CAP. s/p 1 dose of unasyn in the ED, started on ceftx 2g q24 which was transitioned to Zosyn with pseudomonal coverage due to concern for aspiration pneumonia post endoscopy. Currently patient on HFNC 60L/100%    - Zosyn 4.5 q4hrs   - F/u sputum culture   - blood culture NGTD from 2/16  - Aspiration precautions  - Wean O2 as tolerated  - incentive spirometry  - OOBTC Pt w/ worsening cough and voice hoarseness i/s/o daily emesis, found to have mild leukocytosis, neutrophilic predominance, and lactate of 2.5 w/ left lobe infiltrate on CXR. Initially hypotensive on presentation to ED 80s/60s. now improved after 2.5 L NS. Likely aspiration PNA 2/2 intractable vomiting vs CAP. s/p 1 dose of unasyn in the ED, started on ceftx 2g q24 which was transitioned to Zosyn with pseudomonal coverage due to concern for aspiration pneumonia post endoscopy. Currently patient on HFNC 60L/100%    - Zosyn 4.5 q4hrs   - F/u sputum culture   - blood culture NGTD from 2/16  - Aspiration precautions  - Wean O2 as tolerated, comfortable on 50/50 HFNC on 2/18 am  - incentive spirometry  - OOBTC Pt w/ worsening cough and voice hoarseness i/s/o daily emesis, found to have mild leukocytosis, neutrophilic predominance, and lactate of 2.5 w/ left lobe infiltrate on CXR. Initially hypotensive on presentation to ED 80s/60s. now improved after 2.5 L NS. Likely aspiration PNA 2/2 intractable vomiting vs CAP. s/p 1 dose of unasyn in the ED, started on ceftx 2g q24 which was transitioned to Zosyn with pseudomonal coverage due to concern for aspiration pneumonia post endoscopy. Currently patient on HFNC 60L/100%    - Zosyn 4.5 q4hrs  (5 day course)  - F/u sputum culture   - blood culture NGTD from 2/16  - Aspiration precautions  - Wean O2 as tolerated, comfortable on 50/50 HFNC on 2/18 am  - incentive spirometry  - OOBTC

## 2023-02-18 NOTE — PROGRESS NOTE ADULT - SUBJECTIVE AND OBJECTIVE BOX
GASTROENTEROLOGY PROGRESS NOTE  Patient seen and examined at bedside. no bleeding. on HFNC    PERTINENT REVIEW OF SYSTEMS:  CONSTITUTIONAL: No weakness, fevers or chills  HEENT: No visual changes; No vertigo or throat pain   GASTROINTESTINAL: As above.  NEUROLOGICAL: No numbness or weakness  SKIN: No itching, burning, rashes, or lesions     Allergies    lanolin topical (Rash)    Intolerances      MEDICATIONS:  MEDICATIONS  (STANDING):  ARIPiprazole 2 milliGRAM(s) Oral every 24 hours  lamoTRIgine 100 milliGRAM(s) Oral every 24 hours  pantoprazole  Injectable 40 milliGRAM(s) IV Push every 12 hours  PARoxetine 30 milliGRAM(s) Oral every 24 hours  piperacillin/tazobactam IVPB.. 4.5 Gram(s) IV Intermittent every 8 hours  sucralfate suspension 1 Gram(s) Oral every 6 hours    MEDICATIONS  (PRN):  ondansetron Injectable 4 milliGRAM(s) IV Push every 8 hours PRN Nausea and/or Vomiting    Vital Signs Last 24 Hrs  T(C): 36.4 (2023 10:54), Max: 37 (2023 04:52)  T(F): 97.6 (2023 10:54), Max: 98.6 (2023 04:52)  HR: 83 (2023 12:26) (58 - 83)  BP: 97/55 (2023 12:26) (89/54 - 128/60)  BP(mean): 73 (2023 12:26) (68 - 86)  RR: 29 (2023 12:26) (15 - 29)  SpO2: 97% (2023 12:26) (89% - 99%)    Parameters below as of 2023 12:26  Patient On (Oxygen Delivery Method): nasal cannula, high flow  O2 Flow (L/min): 50  O2 Concentration (%): 50    02-17 @ 07:01  -  02-18 @ 07:00  --------------------------------------------------------  IN: 30 mL / OUT: 0 mL / NET: 30 mL     @ 07:01  -   @ 14:34  --------------------------------------------------------  IN: 225 mL / OUT: 600 mL / NET: -375 mL      PHYSICAL EXAM:    General: lying in bed, in no acute distress, on HFNC  HEENT: MMM, conjunctiva and sclera clear  Gastrointestinal: Soft non-tender non-distended; No rebound or guarding  Skin: Warm and dry. No obvious rash    LABS:                        8.0    11.91 )-----------( 235      ( 2023 06:07 )             26.0         142  |  105  |  13  ----------------------------<  135<H>  3.7   |  29  |  0.73    Ca    9.1      2023 06:07  Phos  3.2       Mg     1.9         TPro  5.1<L>  /  Alb  2.5<L>  /  TBili  0.3  /  DBili  x   /  AST  10  /  ALT  14  /  AlkPhos  71  -18    PT/INR - ( 2023 14:33 )   PT: 13.5 sec;   INR: 1.13          PTT - ( 2023 14:33 )  PTT:26.0 sec      Urinalysis Basic - ( 2023 15:41 )    Color: Yellow / Appearance: Clear / S.020 / pH: x  Gluc: x / Ketone: NEGATIVE  / Bili: Negative / Urobili: 0.2 E.U./dL   Blood: x / Protein: 30 mg/dL / Nitrite: POSITIVE   Leuk Esterase: NEGATIVE / RBC: < 5 /HPF / WBC < 5 /HPF   Sq Epi: x / Non Sq Epi: 0-5 /HPF / Bacteria: Present /HPF                Urinalysis with Rflx Culture (collected 2023 15:41)    Culture - Blood (collected 2023 10:34)  Source: .Blood Blood-Peripheral  Preliminary Report (2023 14:01):    No growth at 2 days.    Culture - Blood (collected 2023 10:34)  Source: .Blood Blood-Peripheral  Preliminary Report (2023 14:01):    No growth at 2 days.      RADIOLOGY & ADDITIONAL STUDIES:  Reviewed

## 2023-02-18 NOTE — SWALLOW BEDSIDE ASSESSMENT ADULT - ADDITIONAL RECOMMENDATIONS
Behavioral reflux precautions, including upright position during /60-90 mins after meals, small sips, and slow rate of intake    Consider additional diagnostic imaging, such as esophagram, to further assess esophageal motility. Can also consider MBS to assess impact of esophageal findings on pharyngeal phase of swallowing should respiratory status/chest radiography not improve.    Control risk factors for aspiration PNA via frequent oral hygiene/increasing physical mobility as possible.

## 2023-02-18 NOTE — PROGRESS NOTE ADULT - PROBLEM SELECTOR PLAN 7
F: none  E: replete K<4, Mg<2  N: NPO for 4 hours - will resume per GI recs   VTE Prophylaxis: hold i/s/o active UGIB  GI: protonix ggt  C: DNR/DNI (new molst in chart)  D: RMF F: none  E: replete K<4, Mg<2  N: Regular   VTE Prophylaxis: hold i/s/o active UGIB  GI: protonix ggt  C: DNR/DNI (new molst in chart)  D: RMF

## 2023-02-18 NOTE — PROGRESS NOTE ADULT - ASSESSMENT
76 y/o F PMH of hiatal hernia, esophageal ulcer (diagnosed at [facility] in 1/2023), HLD, depression (on aripiprazole and paroxetine), and seizure disorder (last seizure 2 years ago, on lamotrigine) admitted with anemia in setting of coffee ground emesis and PNA on CXR    #Anemia  #Coffee ground emesis    EGD 2/17/23  -A large size paraesophageal hiatal hernia was seen, the esophagogastric junction(EGJ) was noted at 40 cm, with hiatal narrowing at 34 cm from the incisors. Additional findings include ulceration. Retroflexion view in the stomach confirmed the size and morphology of the hernia.  -Grade D esophagitis with no bleeding was seen starting at 30 cm from the incisors in the lower third of the esophagus and middle third of the esophagus, compatible with nonspecific erosive esophagitis. This is likely the source of coffee ground emesis  -Localized irregularity of the mucosa was noted in the Z-line and gastroesophageal junction.  -Liquefied food material was found in the stomach. Findings were suggestive of gastroparesis/delayed gastric emptying.  -Patchy erythema of the mucosa with no bleeding was noted in the stomach body. These findings are compatible with non-erosive gastritis. Cold forceps biopsies were performed for H. pylori in the stomach body and stomach antrum.  -Normal appearing duodenum    Recommendations:  - Ok for diet  - low fat low fiber for suspected Gastroparesis  - - outpatient GES   - Pantoprazole 40 mg PO BID x 2 weeks then daily  - Carafate Suspension 1g po qid  x 2 weeks  - CThoracic surgery consultation recommended  - please obtain records from [facility] - EGD and HgB    Thank you for allowing us to participate in the care of this patient. GI will sign off the case.  Please call us if you have any further questions or concerns    Wero Vargas M.D.  Gastroenterology Fellow  Weekday Pager: [phone]  Weeknights/Weekend Coverage: Please Call the  for contact info

## 2023-02-18 NOTE — PHYSICAL THERAPY INITIAL EVALUATION ADULT - NEUROVASCULAR ASSESSMENT LUE
This is a 47-year-old female referred by Dr. Puja Joel for GI office telehealth f/u.  Upon review of the chart I saw the patient back in 2012 at the time referred for her constipation bloating and abdominal discomfort.  She was diagnosed with IBS-C.  She had a colonoscopy on November 16, 2012 and this revealed a long redundant tortuous colon with internal hemorrhoids.  There was an area of bulging of the cecal area of unclear etiology so I had her get a CT scan which was done on February 5, 2013 and this revealed no identifiable cause for abdominal pain or other abnormalities except uterine fibroids that were seen.  Patient was told to try over-the-counter options like MiraLAX for her constipation.  She also had an upper GI small bowel follow-through on November 20, 2012 and this was normal.  Patient presented this fall for a different issue.  Pt had her physical in Sept and had labs and her alk phos was elev and her creatinine was up to. Pt had an abdominal US and it showed a 1.3 cm lesion and so had an MRI and this was normal. Pt says her kidney function normalized. Pt has elevated alk phosphatase still. Pt saw Dr Stratton at [employer] last year and they were working up cause of itching and she was going to do work up for celiac-but pt was not gluten free so they could not do an EGD to r/o celiac because. Pt c/o upper gi fullness and pain that is new. Pt tried otc antacids and they have not helped. Pt has 2 autistic kids so has lots of stress.Patient had an MRI ordered and done on October 19, 2020 and this revealed a normal pancreas without any focal lesions atrophy or ductal dilatation she also had a normal-appearing liver on the scan.  Patient has not had her upper endoscopy yet and we have discussed this at the last visit because she had some reflux symptoms and bloating.  She also wanted to consume gluten so I could rule out celiac disease.  Her main issue was that of fullness in the upper GI area.  Over-the-counter antacids had not helped so I recommended she start pantoprazole for this. I ordered a alk phos that was elevat 167 on 10/25/20 and GGT on 11/25/20 was normal at 23. Pt is doing well and uses miralax daily and she goes well. Pt says her calcium was up as well. She knows that her alk phos was up but GGT normal and her mom had parathyroid issues. Pt is booked for her egd/colon in Feb. Pt wants to be tested for celiac but admits she is still gluten free.  *A copy of this note will be sent to referring physician. warm

## 2023-02-18 NOTE — PHYSICAL THERAPY INITIAL EVALUATION ADULT - GENERAL OBSERVATIONS, REHAB EVAL
As per RN Marie and 7 [facility] resident patient cleared for PT. New orders put in for PT. Received patient sitting in a chair + tele, heplock, HFNC 50% FIO2, in NAD

## 2023-02-18 NOTE — PHYSICAL THERAPY INITIAL EVALUATION ADULT - ADDITIONAL COMMENTS
Patient lives with a roommate in an elevator building. Has a HHA 3 days a week for about 4 hours a day. Ambulates with a cane indoors and with a rollator with her HHA outdoors.

## 2023-02-18 NOTE — PHYSICAL THERAPY INITIAL EVALUATION ADULT - PERTINENT HX OF CURRENT PROBLEM, REHAB EVAL
77F PMHx of hiatal hernia, esophageal ulcer (diagnosed at [facility] in 1/2023), HLD, depression (on aripiprazole and paroxetine), and seizure disorder (last seizure 2 years ago, on lamotrigine), presented to [facility] ED after 6 days of dark black emesis and c/f aspiration PNA

## 2023-02-18 NOTE — SWALLOW BEDSIDE ASSESSMENT ADULT - NS SPL SWALLOW CLINIC TRIAL FT
Relatively unremarkable oral phase with adequate bolus acceptance, containment, processing, and clearance. Laryngeal movement consistently palpated. Benefited from rest breaks throughout assessment to optimize respiratory status. Delayed coughing x2 towards end of trials suggestive of aspiration, possibly 2/2 ?backflow of swallowed bolus.

## 2023-02-19 LAB
ALBUMIN SERPL ELPH-MCNC: 2.5 G/DL — LOW (ref 3.3–5)
ALP SERPL-CCNC: 78 U/L — SIGNIFICANT CHANGE UP (ref 40–120)
ALT FLD-CCNC: 22 U/L — SIGNIFICANT CHANGE UP (ref 10–45)
ANION GAP SERPL CALC-SCNC: 9 MMOL/L — SIGNIFICANT CHANGE UP (ref 5–17)
AST SERPL-CCNC: 20 U/L — SIGNIFICANT CHANGE UP (ref 10–40)
BASOPHILS # BLD AUTO: 0.03 K/UL — SIGNIFICANT CHANGE UP (ref 0–0.2)
BASOPHILS NFR BLD AUTO: 0.3 % — SIGNIFICANT CHANGE UP (ref 0–2)
BILIRUB SERPL-MCNC: 0.2 MG/DL — SIGNIFICANT CHANGE UP (ref 0.2–1.2)
BLD GP AB SCN SERPL QL: NEGATIVE — SIGNIFICANT CHANGE UP
BUN SERPL-MCNC: 12 MG/DL — SIGNIFICANT CHANGE UP (ref 7–23)
CALCIUM SERPL-MCNC: 9.2 MG/DL — SIGNIFICANT CHANGE UP (ref 8.4–10.5)
CHLORIDE SERPL-SCNC: 99 MMOL/L — SIGNIFICANT CHANGE UP (ref 96–108)
CO2 SERPL-SCNC: 30 MMOL/L — SIGNIFICANT CHANGE UP (ref 22–31)
CREAT SERPL-MCNC: 0.69 MG/DL — SIGNIFICANT CHANGE UP (ref 0.5–1.3)
EGFR: 88 ML/MIN/1.73M2 — SIGNIFICANT CHANGE UP
EOSINOPHIL # BLD AUTO: 0.21 K/UL — SIGNIFICANT CHANGE UP (ref 0–0.5)
EOSINOPHIL NFR BLD AUTO: 1.9 % — SIGNIFICANT CHANGE UP (ref 0–6)
GLUCOSE SERPL-MCNC: 118 MG/DL — HIGH (ref 70–99)
HCT VFR BLD CALC: 24.7 % — LOW (ref 34.5–45)
HGB BLD-MCNC: 7.8 G/DL — LOW (ref 11.5–15.5)
IMM GRANULOCYTES NFR BLD AUTO: 0.7 % — SIGNIFICANT CHANGE UP (ref 0–0.9)
LYMPHOCYTES # BLD AUTO: 0.84 K/UL — LOW (ref 1–3.3)
LYMPHOCYTES # BLD AUTO: 7.8 % — LOW (ref 13–44)
MAGNESIUM SERPL-MCNC: 1.6 MG/DL — SIGNIFICANT CHANGE UP (ref 1.6–2.6)
MCHC RBC-ENTMCNC: 29.9 PG — SIGNIFICANT CHANGE UP (ref 27–34)
MCHC RBC-ENTMCNC: 31.6 GM/DL — LOW (ref 32–36)
MCV RBC AUTO: 94.6 FL — SIGNIFICANT CHANGE UP (ref 80–100)
MONOCYTES # BLD AUTO: 1 K/UL — HIGH (ref 0–0.9)
MONOCYTES NFR BLD AUTO: 9.3 % — SIGNIFICANT CHANGE UP (ref 2–14)
MRSA PCR RESULT.: NEGATIVE — SIGNIFICANT CHANGE UP
NEUTROPHILS # BLD AUTO: 8.61 K/UL — HIGH (ref 1.8–7.4)
NEUTROPHILS NFR BLD AUTO: 80 % — HIGH (ref 43–77)
NRBC # BLD: 0 /100 WBCS — SIGNIFICANT CHANGE UP (ref 0–0)
PHOSPHATE SERPL-MCNC: 2.7 MG/DL — SIGNIFICANT CHANGE UP (ref 2.5–4.5)
PLATELET # BLD AUTO: 185 K/UL — SIGNIFICANT CHANGE UP (ref 150–400)
POTASSIUM SERPL-MCNC: 3.3 MMOL/L — LOW (ref 3.5–5.3)
POTASSIUM SERPL-SCNC: 3.3 MMOL/L — LOW (ref 3.5–5.3)
PROT SERPL-MCNC: 5 G/DL — LOW (ref 6–8.3)
RBC # BLD: 2.61 M/UL — LOW (ref 3.8–5.2)
RBC # FLD: 14.8 % — HIGH (ref 10.3–14.5)
RH IG SCN BLD-IMP: POSITIVE — SIGNIFICANT CHANGE UP
S AUREUS DNA NOSE QL NAA+PROBE: POSITIVE
SODIUM SERPL-SCNC: 138 MMOL/L — SIGNIFICANT CHANGE UP (ref 135–145)
WBC # BLD: 10.77 K/UL — HIGH (ref 3.8–10.5)
WBC # FLD AUTO: 10.77 K/UL — HIGH (ref 3.8–10.5)

## 2023-02-19 PROCEDURE — 99232 SBSQ HOSP IP/OBS MODERATE 35: CPT | Mod: GC

## 2023-02-19 PROCEDURE — 71045 X-RAY EXAM CHEST 1 VIEW: CPT | Mod: 26

## 2023-02-19 RX ORDER — POTASSIUM CHLORIDE 20 MEQ
40 PACKET (EA) ORAL
Refills: 0 | Status: DISCONTINUED | OUTPATIENT
Start: 2023-02-19 | End: 2023-02-19

## 2023-02-19 RX ORDER — POTASSIUM CHLORIDE 20 MEQ
40 PACKET (EA) ORAL
Refills: 0 | Status: COMPLETED | OUTPATIENT
Start: 2023-02-19 | End: 2023-02-19

## 2023-02-19 RX ORDER — FUROSEMIDE 40 MG
20 TABLET ORAL ONCE
Refills: 0 | Status: COMPLETED | OUTPATIENT
Start: 2023-02-19 | End: 2023-02-19

## 2023-02-19 RX ORDER — ACETAMINOPHEN 500 MG
650 TABLET ORAL EVERY 6 HOURS
Refills: 0 | Status: DISCONTINUED | OUTPATIENT
Start: 2023-02-19 | End: 2023-02-28

## 2023-02-19 RX ORDER — MAGNESIUM SULFATE 500 MG/ML
1 VIAL (ML) INJECTION ONCE
Refills: 0 | Status: COMPLETED | OUTPATIENT
Start: 2023-02-19 | End: 2023-02-19

## 2023-02-19 RX ADMIN — ARIPIPRAZOLE 2 MILLIGRAM(S): 15 TABLET ORAL at 13:12

## 2023-02-19 RX ADMIN — Medication 650 MILLIGRAM(S): at 21:16

## 2023-02-19 RX ADMIN — Medication 20 MILLIGRAM(S): at 14:40

## 2023-02-19 RX ADMIN — Medication 100 GRAM(S): at 09:46

## 2023-02-19 RX ADMIN — PIPERACILLIN AND TAZOBACTAM 25 GRAM(S): 4; .5 INJECTION, POWDER, LYOPHILIZED, FOR SOLUTION INTRAVENOUS at 05:57

## 2023-02-19 RX ADMIN — PANTOPRAZOLE SODIUM 40 MILLIGRAM(S): 20 TABLET, DELAYED RELEASE ORAL at 05:54

## 2023-02-19 RX ADMIN — Medication 30 MILLIGRAM(S): at 13:10

## 2023-02-19 RX ADMIN — Medication 1 GRAM(S): at 18:10

## 2023-02-19 RX ADMIN — Medication 1 GRAM(S): at 05:54

## 2023-02-19 RX ADMIN — LAMOTRIGINE 100 MILLIGRAM(S): 25 TABLET, ORALLY DISINTEGRATING ORAL at 13:09

## 2023-02-19 RX ADMIN — PANTOPRAZOLE SODIUM 40 MILLIGRAM(S): 20 TABLET, DELAYED RELEASE ORAL at 17:06

## 2023-02-19 RX ADMIN — PIPERACILLIN AND TAZOBACTAM 25 GRAM(S): 4; .5 INJECTION, POWDER, LYOPHILIZED, FOR SOLUTION INTRAVENOUS at 21:16

## 2023-02-19 RX ADMIN — Medication 1 GRAM(S): at 13:12

## 2023-02-19 RX ADMIN — Medication 40 MILLIEQUIVALENT(S): at 13:10

## 2023-02-19 RX ADMIN — Medication 650 MILLIGRAM(S): at 22:15

## 2023-02-19 RX ADMIN — Medication 1 GRAM(S): at 05:15

## 2023-02-19 RX ADMIN — Medication 40 MILLIEQUIVALENT(S): at 14:40

## 2023-02-19 RX ADMIN — Medication 650 MILLIGRAM(S): at 07:05

## 2023-02-19 RX ADMIN — PIPERACILLIN AND TAZOBACTAM 25 GRAM(S): 4; .5 INJECTION, POWDER, LYOPHILIZED, FOR SOLUTION INTRAVENOUS at 13:14

## 2023-02-19 NOTE — PROGRESS NOTE ADULT - PROBLEM SELECTOR PLAN 2
Pt w/ worsening cough and voice hoarseness i/s/o daily emesis, found to have mild leukocytosis, neutrophilic predominance, and lactate of 2.5 w/ left lobe infiltrate on CXR. Initially hypotensive on presentation to ED 80s/60s. now improved after 2.5 L NS. Likely aspiration PNA 2/2 intractable vomiting vs CAP. s/p 1 dose of unasyn in the ED, started on ceftx 2g q24 which was transitioned to Zosyn with pseudomonal coverage due to concern for aspiration pneumonia post endoscopy.  Patient spiked fever today with increase O2 requirement to 50/100 weaned to 50/90. MRSA swab was negative     - C/w Zosyn 4.5 q4hrs  (5 day course)  - F/u blood culture (2/19) and sputum culture (2/19)  - blood culture NGTD from 2/16  - Aspiration precautions  - Wean O2 as tolerated  - incentive spirometry  - OOBTC

## 2023-02-19 NOTE — PROGRESS NOTE ADULT - SUBJECTIVE AND OBJECTIVE BOX
OVERNIGHT EVENTS:    SUBJECTIVE / INTERVAL HPI: Patient seen and examined at bedside.     VITAL SIGNS:  Vital Signs Last 24 Hrs  T(C): 36.7 (19 Feb 2023 10:00), Max: 38.6 (19 Feb 2023 06:00)  T(F): 98.1 (19 Feb 2023 10:00), Max: 101.5 (19 Feb 2023 06:00)  HR: 84 (19 Feb 2023 09:05) (63 - 84)  BP: 108/52 (19 Feb 2023 08:30) (89/54 - 144/58)  BP(mean): 75 (19 Feb 2023 08:30) (66 - 84)  RR: 18 (19 Feb 2023 09:05) (18 - 29)  SpO2: 96% (19 Feb 2023 09:05) (92% - 100%)    Parameters below as of 19 Feb 2023 09:05  Patient On (Oxygen Delivery Method): nasal cannula, high flow  O2 Flow (L/min): 50  O2 Concentration (%): 88    PHYSICAL EXAM:    General: In bed in no acute distress on HFNC   HEENT: NCAT; PERRL, anicteric sclera; MMM  Neck: supple, trachea midline  Cardiovascular: S1, S2 normal; RRR, no M/G/R  Respiratory: clear lungs with mildly decreased air entry in the bases   Gastrointestinal: soft, nontender, nondistended. bowel sounds present.  Skin: no ulcerations or visible rashes appreciated  Extremities: WWP; no edema, clubbing or cyanosis  Vascular: 2+ radial, DP/PT pulses B/L  Neurological: AAOx3; CN II-XII grossly intact; no focal deficits    MEDICATIONS:  MEDICATIONS  (STANDING):  ARIPiprazole 2 milliGRAM(s) Oral every 24 hours  lamoTRIgine 100 milliGRAM(s) Oral every 24 hours  pantoprazole    Tablet 40 milliGRAM(s) Oral every 12 hours  PARoxetine 30 milliGRAM(s) Oral every 24 hours  piperacillin/tazobactam IVPB.. 4.5 Gram(s) IV Intermittent every 8 hours  potassium chloride    Tablet ER 40 milliEquivalent(s) Oral every 2 hours  sucralfate suspension 1 Gram(s) Oral every 6 hours    MEDICATIONS  (PRN):  acetaminophen     Tablet .. 650 milliGRAM(s) Oral every 6 hours PRN Temp greater or equal to 38C (100.4F)  ondansetron Injectable 4 milliGRAM(s) IV Push every 8 hours PRN Nausea and/or Vomiting      ALLERGIES:  Allergies    lanolin topical (Rash)    Intolerances        LABS:                        7.8    10.77 )-----------( 185      ( 19 Feb 2023 06:42 )             24.7     02-19    138  |  99  |  12  ----------------------------<  118<H>  3.3<L>   |  30  |  0.69    Ca    9.2      19 Feb 2023 06:42  Phos  2.7     02-19  Mg     1.6     02-19    TPro  5.0<L>  /  Alb  2.5<L>  /  TBili  0.2  /  DBili  x   /  AST  20  /  ALT  22  /  AlkPhos  78  02-19    PT/INR - ( 17 Feb 2023 14:33 )   PT: 13.5 sec;   INR: 1.13          PTT - ( 17 Feb 2023 14:33 )  PTT:26.0 sec    CAPILLARY BLOOD GLUCOSE          RADIOLOGY & ADDITIONAL TESTS: Reviewed. OVERNIGHT EVENTS: Increasing o2 requirements at 5 am, increasing fio2 from 50 -->100. first time temp of101.5, cx.     SUBJECTIVE / INTERVAL HPI: Patient in bed and in no acute distress, without SOB, CP, or palpitations. Comfortable on the HFNC 50/100    VITAL SIGNS:  Vital Signs Last 24 Hrs  T(C): 36.7 (19 Feb 2023 10:00), Max: 38.6 (19 Feb 2023 06:00)  T(F): 98.1 (19 Feb 2023 10:00), Max: 101.5 (19 Feb 2023 06:00)  HR: 84 (19 Feb 2023 09:05) (63 - 84)  BP: 108/52 (19 Feb 2023 08:30) (89/54 - 144/58)  BP(mean): 75 (19 Feb 2023 08:30) (66 - 84)  RR: 18 (19 Feb 2023 09:05) (18 - 29)  SpO2: 96% (19 Feb 2023 09:05) (92% - 100%)    Parameters below as of 19 Feb 2023 09:05  Patient On (Oxygen Delivery Method): nasal cannula, high flow  O2 Flow (L/min): 50  O2 Concentration (%): 88    PHYSICAL EXAM:    General: In bed in no acute distress on HFNC   HEENT: NCAT; PERRL, anicteric sclera; MMM  Neck: supple, trachea midline  Cardiovascular: S1, S2 normal; RRR, no M/G/R  Respiratory: clear lungs with mildly decreased air entry in the bases   Gastrointestinal: soft, nontender, nondistended. bowel sounds present.  Skin: no ulcerations or visible rashes appreciated  Extremities: WWP; no edema, clubbing or cyanosis  Vascular: 2+ radial, DP/PT pulses B/L  Neurological: AAOx3; CN II-XII grossly intact; no focal deficits    MEDICATIONS:  MEDICATIONS  (STANDING):  ARIPiprazole 2 milliGRAM(s) Oral every 24 hours  lamoTRIgine 100 milliGRAM(s) Oral every 24 hours  pantoprazole    Tablet 40 milliGRAM(s) Oral every 12 hours  PARoxetine 30 milliGRAM(s) Oral every 24 hours  piperacillin/tazobactam IVPB.. 4.5 Gram(s) IV Intermittent every 8 hours  potassium chloride    Tablet ER 40 milliEquivalent(s) Oral every 2 hours  sucralfate suspension 1 Gram(s) Oral every 6 hours    MEDICATIONS  (PRN):  acetaminophen     Tablet .. 650 milliGRAM(s) Oral every 6 hours PRN Temp greater or equal to 38C (100.4F)  ondansetron Injectable 4 milliGRAM(s) IV Push every 8 hours PRN Nausea and/or Vomiting      ALLERGIES:  Allergies    lanolin topical (Rash)    Intolerances        LABS:                        7.8    10.77 )-----------( 185      ( 19 Feb 2023 06:42 )             24.7     02-19    138  |  99  |  12  ----------------------------<  118<H>  3.3<L>   |  30  |  0.69    Ca    9.2      19 Feb 2023 06:42  Phos  2.7     02-19  Mg     1.6     02-19    TPro  5.0<L>  /  Alb  2.5<L>  /  TBili  0.2  /  DBili  x   /  AST  20  /  ALT  22  /  AlkPhos  78  02-19    PT/INR - ( 17 Feb 2023 14:33 )   PT: 13.5 sec;   INR: 1.13          PTT - ( 17 Feb 2023 14:33 )  PTT:26.0 sec    CAPILLARY BLOOD GLUCOSE          RADIOLOGY & ADDITIONAL TESTS: Reviewed.

## 2023-02-19 NOTE — OCCUPATIONAL THERAPY INITIAL EVALUATION ADULT - LEVEL OF INDEPENDENCE: SUPINE/SIT, REHAB EVAL
deferred 2/2 pt requiring hygiene and sheet change from nursing staff as pt found to have liquid diarrhea

## 2023-02-19 NOTE — OCCUPATIONAL THERAPY INITIAL EVALUATION ADULT - PERTINENT HX OF CURRENT PROBLEM, REHAB EVAL
78 y/o F PMH of hiatal hernia, esophageal ulcer (diagnosed at [facility] in 1/2023), HLD, depression (on aripiprazole and paroxetine), and seizure disorder (last seizure 2 years ago, on lamotrigine), p/w 6 days of dark black emesis after drinking a glass of wine last Friday. Pt was recently evaluated at [facility] for similar symptoms, found to have an esophageal ulcer on EGD, and was discharged on carafate and protonix.  She has since had daily episodes of dark black emesis, dry cough, and hoarseness of her voice. She also c/o decreased appetite and general malaise. She denies any abdominal pain, shortness of breath, diarrhea, chest pain, palpitations, headaches, dizziness, dysuria, constipation, melena, or hematochezia.

## 2023-02-19 NOTE — PROGRESS NOTE ADULT - PROBLEM SELECTOR PROBLEM 3
Problem: Goal Outcome Summary  Goal: Goal Outcome Summary  Outcome: No Change  AVSS. Complaints of lower back back, PRN tylenol and flexeril given. Complaints of nausea after eating breakfast this morning, PRN compazine and PRN ativan given x1. 20mg Lasix given. Adequate urine output. Tac gtt infusing at 3mL/hr. Continue current plan of care.     Problem: Blood and Marrow Transplantation  Goal: Blood and Marrow Transplantation  Signs and symptoms of listed problems will be absent or manageable.   Outcome: No Change    07/28/17 1422   Blood and Marrow Transplantation   Blood and Marrow Transplantation Assessed all   Blood and Marrow Transplantation Present acute pain;fluid imbalances;metabolic imbalances;nausea/vomiting            Anemia

## 2023-02-19 NOTE — OCCUPATIONAL THERAPY INITIAL EVALUATION ADULT - DIAGNOSIS, OT EVAL
Pt presents with confusion, decreased functional endurance, and decreased BUE/BLE strength impacting her ability to independently complete ADLs, functional transfers, and functional mobility.

## 2023-02-19 NOTE — OCCUPATIONAL THERAPY INITIAL EVALUATION ADULT - GENERAL OBSERVATIONS, REHAB EVAL
OT IE Completed. Pt's MALCOLM Marcelo cleared pt for therapy. Pt found to be hypotensive (90/52) 7L MD Nixon made aware, cleared pt for therapy. Pt received semisupine in bed, +tele, +primafit, +heplock, +HFNC 50%, agreeable to OT with max encouragement. Pt left as found, MALCOLM Marcelo present and aware. OT IE Completed. Pt's MALCOLM Marcelo cleared pt for therapy. Pt found to be hypotensive (90/52) 7L MD Nixon made aware, cleared pt for therapy. Pt received semisupine in bed, +tele, +primafit, +heplock, +HFNC, agreeable to OT with max encouragement. Rehab Tech Gene present. Pt left as found, MALCOLM Marcelo present and aware.

## 2023-02-19 NOTE — PROGRESS NOTE ADULT - PROBLEM SELECTOR PLAN 7
F: none  E: replete K<4, Mg<2  N: Regular   VTE Prophylaxis: hold i/s/o active UGIB  GI: protonix ggt  C: DNR/DNI (new molst in chart)  D: RMF

## 2023-02-19 NOTE — OCCUPATIONAL THERAPY INITIAL EVALUATION ADULT - PHYSICAL ASSIST/NONPHYSICAL ASSIST:TOILET, OT EVAL
requiring max A for pericare after bowel movement semisupine in bed (liquid diarrhea noted, RN Alexander made aware)./verbal cues/nonverbal cues (demo/gestures)/1 person assist

## 2023-02-19 NOTE — OCCUPATIONAL THERAPY INITIAL EVALUATION ADULT - ADDITIONAL COMMENTS
Pt reports that she lives in an elevator access apartment with 0STE. Pt reports that prior to admission, pt was independent in all ADLs and IADLs, and ambulates with no device inside, utilizes a rollator in the community. Pt also has a SC. Pt reports she has a HHA 3 days/wk, 4hrs/day to assist with IADLs such as shopping, cleaning, and cooking.

## 2023-02-19 NOTE — PROGRESS NOTE ADULT - PROBLEM SELECTOR PLAN 1
#GIB  #Coffee-ground emesis  Pt w/ known hx of esophageal ulcer, diagnosed at [facility] in 1/2023, discharged on carafate and protonix with surgery f/u for hiatal hernia repair, now p/w 6 days of dark black emesis after drinking a glass of wine. Pt reports daily episodes of emesis, and has since developed a cough and hoarseness of her voice. Found to have mild leukocytosis, neutrophilic predominance, and lactate of 2.5 w/ left lobe infiltrate on CXR. Denies any bright red blood in her vomit, no abdominal pain, diarrhea, hematochezia, or melena. Denies any dizziness or lightheadedness.  Endoscopy findings:   A large size paraesophageal hiatal hernia was seen, the esophagogastric junction(EGJ) was noted at 40 cm, with hiatal narrowing at 34 cm from the incisors. Additional findings include ulceration. Retroflexion view in the stomach confirmed the size and morphology of the hernia.  -Grade D esophagitis with no bleeding was seen starting at 30 cm from the incisors in the lower third of the esophagus and middle third of the esophagus, compatible with nonspecific erosive esophagitis. This is likely the source of coffee ground emesis  -Localized irregularity of the mucosa was noted in the Z-line and gastroesophageal junction.  -Liquefied food material was found in the stomach. Findings were suggestive of gastroparesis/delayed gastric emptying.  -Patchy erythema of the mucosa with no bleeding was noted in the stomach body. These findings are compatible with non-erosive gastritis. Cold forceps biopsies were performed for H. pylori in the stomach body and stomach antrum.  -Normal appearing duodenum  Recs:   - Started on Liquid Diet (2/18 am) advanced to Regular Diet (2/18 pm)   - Pantoprazole 40 mg PO BID    - Carafate Suspension 1g po qid     - Barium Swallow with tablet recommended  - protonix 40mg q12h  - carafate 1g oral sln q6hrs

## 2023-02-19 NOTE — OCCUPATIONAL THERAPY INITIAL EVALUATION ADULT - PHYSICAL ASSIST/NONPHYSICAL ASSIST, REHAB EVAL
to roll to assist with pericare after bowel movement (Diarrhea)/verbal cues/nonverbal cues (demo/gestures)/2 person assist

## 2023-02-19 NOTE — OCCUPATIONAL THERAPY INITIAL EVALUATION ADULT - MANUAL MUSCLE TESTING RESULTS, REHAB EVAL
BUE shoulder flexion grossly 4-/5, BUE elbow flexion/extension grossly 4/5. BUE  strength grossly 4/5. BLE grossly 3/5 based on functional assessment.

## 2023-02-20 LAB
ALBUMIN SERPL ELPH-MCNC: 2.4 G/DL — LOW (ref 3.3–5)
ALP SERPL-CCNC: 133 U/L — HIGH (ref 40–120)
ALT FLD-CCNC: 81 U/L — HIGH (ref 10–45)
ANION GAP SERPL CALC-SCNC: 9 MMOL/L — SIGNIFICANT CHANGE UP (ref 5–17)
APPEARANCE UR: CLEAR — SIGNIFICANT CHANGE UP
APTT BLD: 27.2 SEC — LOW (ref 27.5–35.5)
AST SERPL-CCNC: 90 U/L — HIGH (ref 10–40)
BACTERIA # UR AUTO: PRESENT /HPF
BASOPHILS # BLD AUTO: 0.05 K/UL — SIGNIFICANT CHANGE UP (ref 0–0.2)
BASOPHILS NFR BLD AUTO: 0.5 % — SIGNIFICANT CHANGE UP (ref 0–2)
BILIRUB SERPL-MCNC: 0.2 MG/DL — SIGNIFICANT CHANGE UP (ref 0.2–1.2)
BILIRUB UR-MCNC: NEGATIVE — SIGNIFICANT CHANGE UP
BUN SERPL-MCNC: 11 MG/DL — SIGNIFICANT CHANGE UP (ref 7–23)
CALCIUM SERPL-MCNC: 8.9 MG/DL — SIGNIFICANT CHANGE UP (ref 8.4–10.5)
CHLORIDE SERPL-SCNC: 98 MMOL/L — SIGNIFICANT CHANGE UP (ref 96–108)
CO2 SERPL-SCNC: 30 MMOL/L — SIGNIFICANT CHANGE UP (ref 22–31)
COD CRY URNS QL: ABNORMAL /HPF
COLOR SPEC: YELLOW — SIGNIFICANT CHANGE UP
COMMENT - URINE: SIGNIFICANT CHANGE UP
CREAT SERPL-MCNC: 0.79 MG/DL — SIGNIFICANT CHANGE UP (ref 0.5–1.3)
CULTURE RESULTS: SIGNIFICANT CHANGE UP
DIFF PNL FLD: ABNORMAL
EGFR: 76 ML/MIN/1.73M2 — SIGNIFICANT CHANGE UP
EOSINOPHIL # BLD AUTO: 0.25 K/UL — SIGNIFICANT CHANGE UP (ref 0–0.5)
EOSINOPHIL NFR BLD AUTO: 2.4 % — SIGNIFICANT CHANGE UP (ref 0–6)
EPI CELLS # UR: SIGNIFICANT CHANGE UP /HPF (ref 0–5)
GLUCOSE SERPL-MCNC: 124 MG/DL — HIGH (ref 70–99)
GLUCOSE UR QL: NEGATIVE — SIGNIFICANT CHANGE UP
GRAM STN FLD: SIGNIFICANT CHANGE UP
HCT VFR BLD CALC: 25.9 % — LOW (ref 34.5–45)
HGB BLD-MCNC: 7.9 G/DL — LOW (ref 11.5–15.5)
IMM GRANULOCYTES NFR BLD AUTO: 1 % — HIGH (ref 0–0.9)
INR BLD: 1.26 — HIGH (ref 0.88–1.16)
KETONES UR-MCNC: ABNORMAL MG/DL
LEUKOCYTE ESTERASE UR-ACNC: NEGATIVE — SIGNIFICANT CHANGE UP
LYMPHOCYTES # BLD AUTO: 1.08 K/UL — SIGNIFICANT CHANGE UP (ref 1–3.3)
LYMPHOCYTES # BLD AUTO: 10.5 % — LOW (ref 13–44)
MAGNESIUM SERPL-MCNC: 1.8 MG/DL — SIGNIFICANT CHANGE UP (ref 1.6–2.6)
MCHC RBC-ENTMCNC: 29.2 PG — SIGNIFICANT CHANGE UP (ref 27–34)
MCHC RBC-ENTMCNC: 30.5 GM/DL — LOW (ref 32–36)
MCV RBC AUTO: 95.6 FL — SIGNIFICANT CHANGE UP (ref 80–100)
MONOCYTES # BLD AUTO: 0.96 K/UL — HIGH (ref 0–0.9)
MONOCYTES NFR BLD AUTO: 9.4 % — SIGNIFICANT CHANGE UP (ref 2–14)
NEUTROPHILS # BLD AUTO: 7.82 K/UL — HIGH (ref 1.8–7.4)
NEUTROPHILS NFR BLD AUTO: 76.2 % — SIGNIFICANT CHANGE UP (ref 43–77)
NITRITE UR-MCNC: NEGATIVE — SIGNIFICANT CHANGE UP
NRBC # BLD: 0 /100 WBCS — SIGNIFICANT CHANGE UP (ref 0–0)
PH UR: 5.5 — SIGNIFICANT CHANGE UP (ref 5–8)
PHOSPHATE SERPL-MCNC: 3.5 MG/DL — SIGNIFICANT CHANGE UP (ref 2.5–4.5)
PLATELET # BLD AUTO: 230 K/UL — SIGNIFICANT CHANGE UP (ref 150–400)
POTASSIUM SERPL-MCNC: 4.2 MMOL/L — SIGNIFICANT CHANGE UP (ref 3.5–5.3)
POTASSIUM SERPL-SCNC: 4.2 MMOL/L — SIGNIFICANT CHANGE UP (ref 3.5–5.3)
PROT SERPL-MCNC: 4.7 G/DL — LOW (ref 6–8.3)
PROT UR-MCNC: 30 MG/DL
PROTHROM AB SERPL-ACNC: 15 SEC — HIGH (ref 10.5–13.4)
RBC # BLD: 2.71 M/UL — LOW (ref 3.8–5.2)
RBC # FLD: 15 % — HIGH (ref 10.3–14.5)
RBC CASTS # UR COMP ASSIST: < 5 /HPF — SIGNIFICANT CHANGE UP
SODIUM SERPL-SCNC: 137 MMOL/L — SIGNIFICANT CHANGE UP (ref 135–145)
SP GR SPEC: >=1.03 — SIGNIFICANT CHANGE UP (ref 1–1.03)
SPECIMEN SOURCE: SIGNIFICANT CHANGE UP
SURGICAL PATHOLOGY STUDY: SIGNIFICANT CHANGE UP
UROBILINOGEN FLD QL: 0.2 E.U./DL — SIGNIFICANT CHANGE UP
WBC # BLD: 10.26 K/UL — SIGNIFICANT CHANGE UP (ref 3.8–10.5)
WBC # FLD AUTO: 10.26 K/UL — SIGNIFICANT CHANGE UP (ref 3.8–10.5)
WBC UR QL: < 5 /HPF — SIGNIFICANT CHANGE UP

## 2023-02-20 PROCEDURE — 93308 TTE F-UP OR LMTD: CPT | Mod: 26,GC

## 2023-02-20 PROCEDURE — 99232 SBSQ HOSP IP/OBS MODERATE 35: CPT | Mod: GC

## 2023-02-20 PROCEDURE — 76604 US EXAM CHEST: CPT | Mod: 26,GC

## 2023-02-20 PROCEDURE — 71275 CT ANGIOGRAPHY CHEST: CPT | Mod: 26

## 2023-02-20 RX ORDER — HEPARIN SODIUM 5000 [USP'U]/ML
1500 INJECTION INTRAVENOUS; SUBCUTANEOUS
Qty: 25000 | Refills: 0 | Status: DISCONTINUED | OUTPATIENT
Start: 2023-02-20 | End: 2023-02-21

## 2023-02-20 RX ORDER — ACETAMINOPHEN 500 MG
650 TABLET ORAL ONCE
Refills: 0 | Status: COMPLETED | OUTPATIENT
Start: 2023-02-20 | End: 2023-02-20

## 2023-02-20 RX ORDER — FUROSEMIDE 40 MG
20 TABLET ORAL ONCE
Refills: 0 | Status: COMPLETED | OUTPATIENT
Start: 2023-02-20 | End: 2023-02-20

## 2023-02-20 RX ADMIN — PANTOPRAZOLE SODIUM 40 MILLIGRAM(S): 20 TABLET, DELAYED RELEASE ORAL at 06:00

## 2023-02-20 RX ADMIN — PANTOPRAZOLE SODIUM 40 MILLIGRAM(S): 20 TABLET, DELAYED RELEASE ORAL at 17:26

## 2023-02-20 RX ADMIN — PIPERACILLIN AND TAZOBACTAM 25 GRAM(S): 4; .5 INJECTION, POWDER, LYOPHILIZED, FOR SOLUTION INTRAVENOUS at 13:31

## 2023-02-20 RX ADMIN — PIPERACILLIN AND TAZOBACTAM 25 GRAM(S): 4; .5 INJECTION, POWDER, LYOPHILIZED, FOR SOLUTION INTRAVENOUS at 22:39

## 2023-02-20 RX ADMIN — Medication 100 MILLIGRAM(S): at 13:27

## 2023-02-20 RX ADMIN — Medication 1 GRAM(S): at 06:00

## 2023-02-20 RX ADMIN — ARIPIPRAZOLE 2 MILLIGRAM(S): 15 TABLET ORAL at 12:05

## 2023-02-20 RX ADMIN — HEPARIN SODIUM 15 UNIT(S)/HR: 5000 INJECTION INTRAVENOUS; SUBCUTANEOUS at 23:52

## 2023-02-20 RX ADMIN — Medication 20 MILLIGRAM(S): at 17:25

## 2023-02-20 RX ADMIN — Medication 650 MILLIGRAM(S): at 02:15

## 2023-02-20 RX ADMIN — Medication 1 GRAM(S): at 12:06

## 2023-02-20 RX ADMIN — Medication 650 MILLIGRAM(S): at 01:19

## 2023-02-20 RX ADMIN — PIPERACILLIN AND TAZOBACTAM 25 GRAM(S): 4; .5 INJECTION, POWDER, LYOPHILIZED, FOR SOLUTION INTRAVENOUS at 06:00

## 2023-02-20 RX ADMIN — Medication 1 GRAM(S): at 18:30

## 2023-02-20 RX ADMIN — LAMOTRIGINE 100 MILLIGRAM(S): 25 TABLET, ORALLY DISINTEGRATING ORAL at 10:04

## 2023-02-20 RX ADMIN — Medication 30 MILLIGRAM(S): at 10:04

## 2023-02-20 RX ADMIN — Medication 100 MILLIGRAM(S): at 23:54

## 2023-02-20 RX ADMIN — Medication 1 GRAM(S): at 00:07

## 2023-02-20 NOTE — PROGRESS NOTE ADULT - SUBJECTIVE AND OBJECTIVE BOX
OVERNIGHT EVENTS:    SUBJECTIVE / INTERVAL HPI: Patient seen and examined at bedside.     VITAL SIGNS:  Vital Signs Last 24 Hrs  T(C): 36.7 (20 Feb 2023 06:38), Max: 38 (20 Feb 2023 01:00)  T(F): 98.1 (20 Feb 2023 06:38), Max: 100.4 (20 Feb 2023 01:00)  HR: 70 (20 Feb 2023 08:10) (64 - 84)  BP: 91/51 (20 Feb 2023 08:10) (90/52 - 109/55)  BP(mean): 67 (20 Feb 2023 08:10) (65 - 78)  RR: 18 (20 Feb 2023 08:10) (18 - 22)  SpO2: 95% (20 Feb 2023 08:10) (90% - 100%)    Parameters below as of 20 Feb 2023 08:10  Patient On (Oxygen Delivery Method): nasal cannula  O2 Flow (L/min): 60  O2 Concentration (%): 100    PHYSICAL EXAM:    General: WDWN  HEENT: NCAT; PERRL, anicteric sclera; MMM  Neck: supple, trachea midline  Cardiovascular: S1, S2 normal; RRR, no M/G/R  Respiratory: CTABL; no W/R/R  Gastrointestinal: soft, nontender, nondistended. bowel sounds present.  Skin: no ulcerations or visible rashes appreciated  Extremities: WWP; no edema, clubbing or cyanosis  Vascular: 2+ radial, DP/PT pulses B/L  Neurological: AAOx3; CN II-XII grossly intact; no focal deficits    MEDICATIONS:  MEDICATIONS  (STANDING):  ARIPiprazole 2 milliGRAM(s) Oral every 24 hours  lamoTRIgine 100 milliGRAM(s) Oral every 24 hours  pantoprazole    Tablet 40 milliGRAM(s) Oral every 12 hours  PARoxetine 30 milliGRAM(s) Oral every 24 hours  piperacillin/tazobactam IVPB.. 4.5 Gram(s) IV Intermittent every 8 hours  sucralfate suspension 1 Gram(s) Oral every 6 hours    MEDICATIONS  (PRN):  acetaminophen     Tablet .. 650 milliGRAM(s) Oral every 6 hours PRN Temp greater or equal to 38C (100.4F)  ondansetron Injectable 4 milliGRAM(s) IV Push every 8 hours PRN Nausea and/or Vomiting      ALLERGIES:  Allergies    lanolin topical (Rash)    Intolerances        LABS:                        7.9    10.26 )-----------( 230      ( 20 Feb 2023 05:30 )             25.9     02-20    137  |  98  |  11  ----------------------------<  124<H>  4.2   |  30  |  0.79    Ca    8.9      20 Feb 2023 05:30  Phos  3.5     02-20  Mg     1.8     02-20    TPro  4.7<L>  /  Alb  2.4<L>  /  TBili  0.2  /  DBili  x   /  AST  90<H>  /  ALT  81<H>  /  AlkPhos  133<H>  02-20        CAPILLARY BLOOD GLUCOSE          RADIOLOGY & ADDITIONAL TESTS: Reviewed. OVERNIGHT EVENTS: FiO2 increased to 100% due to 96% spO2    SUBJECTIVE / INTERVAL HPI: In bed reporting her cough being the same, SOB is the same but she has sputum production.     VITAL SIGNS:  Vital Signs Last 24 Hrs  T(C): 36.7 (20 Feb 2023 06:38), Max: 38 (20 Feb 2023 01:00)  T(F): 98.1 (20 Feb 2023 06:38), Max: 100.4 (20 Feb 2023 01:00)  HR: 70 (20 Feb 2023 08:10) (64 - 84)  BP: 91/51 (20 Feb 2023 08:10) (90/52 - 109/55)  BP(mean): 67 (20 Feb 2023 08:10) (65 - 78)  RR: 18 (20 Feb 2023 08:10) (18 - 22)  SpO2: 95% (20 Feb 2023 08:10) (90% - 100%)    Parameters below as of 20 Feb 2023 08:10  Patient On (Oxygen Delivery Method): nasal cannula  O2 Flow (L/min): 60  O2 Concentration (%): 100    PHYSICAL EXAM:    General: In bed in no acute distress on HFNC   HEENT: NCAT; PERRL, anicteric sclera; MMM  Neck: supple, trachea midline  Cardiovascular: S1, S2 normal; RRR, no M/G/R  Respiratory: crackles in the right lower lobe, with mildly decreased air entry in the bases   Gastrointestinal: soft, nontender, nondistended. bowel sounds present.  Skin: no ulcerations or visible rashes appreciated  Extremities: WWP; no edema, clubbing or cyanosis  Vascular: 2+ radial, DP/PT pulses B/L  Neurological: AAOx3; CN II-XII grossly intact; no focal deficits    MEDICATIONS:  MEDICATIONS  (STANDING):  ARIPiprazole 2 milliGRAM(s) Oral every 24 hours  lamoTRIgine 100 milliGRAM(s) Oral every 24 hours  pantoprazole    Tablet 40 milliGRAM(s) Oral every 12 hours  PARoxetine 30 milliGRAM(s) Oral every 24 hours  piperacillin/tazobactam IVPB.. 4.5 Gram(s) IV Intermittent every 8 hours  sucralfate suspension 1 Gram(s) Oral every 6 hours    MEDICATIONS  (PRN):  acetaminophen     Tablet .. 650 milliGRAM(s) Oral every 6 hours PRN Temp greater or equal to 38C (100.4F)  ondansetron Injectable 4 milliGRAM(s) IV Push every 8 hours PRN Nausea and/or Vomiting      ALLERGIES:  Allergies    lanolin topical (Rash)    Intolerances        LABS:                        7.9    10.26 )-----------( 230      ( 20 Feb 2023 05:30 )             25.9     02-20    137  |  98  |  11  ----------------------------<  124<H>  4.2   |  30  |  0.79    Ca    8.9      20 Feb 2023 05:30  Phos  3.5     02-20  Mg     1.8     02-20    TPro  4.7<L>  /  Alb  2.4<L>  /  TBili  0.2  /  DBili  x   /  AST  90<H>  /  ALT  81<H>  /  AlkPhos  133<H>  02-20        CAPILLARY BLOOD GLUCOSE          RADIOLOGY & ADDITIONAL TESTS: Reviewed.

## 2023-02-20 NOTE — PROGRESS NOTE ADULT - PROBLEM SELECTOR PLAN 2
Pt w/ worsening cough and voice hoarseness i/s/o daily emesis, found to have mild leukocytosis, neutrophilic predominance, and lactate of 2.5 w/ left lobe infiltrate on CXR. Initially hypotensive on presentation to ED 80s/60s. now improved after 2.5 L NS. Likely aspiration PNA 2/2 intractable vomiting vs CAP. s/p 1 dose of unasyn in the ED, started on ceftx 2g q24 which was transitioned to Zosyn with pseudomonal coverage due to concern for aspiration pneumonia post endoscopy.  Patient spiked fever today with increase O2 requirement to 50/100 weaned to 50/90. MRSA swab was negative     - C/w Zosyn 4.5 q4hrs (5 day course)  - F/u blood culture (2/19) and sputum culture (2/19)  - blood culture NGTD from 2/16  - Aspiration precautions  - Wean O2 as tolerated  - incentive spirometry  - OOBTC Pt w/ worsening cough and voice hoarseness i/s/o daily emesis, found to have mild leukocytosis, neutrophilic predominance, and lactate of 2.5 w/ left lobe infiltrate on CXR. Initially hypotensive on presentation to ED 80s/60s. now improved after 2.5 L NS. Likely aspiration PNA 2/2 intractable vomiting vs CAP. s/p 1 dose of unasyn in the ED, started on ceftx 2g q24 which was transitioned to Zosyn with pseudomonal coverage due to concern for aspiration pneumonia post endoscopy.  Patient spiked fever today with increase O2 requirement to 50/100 weaned to 50/90. MRSA swab was negative     - C/w Zosyn 4.5 q4hrs (5 day course) today is day 3  - Blood culture (2/19) NGTD and sputum culture (2/20 - collected)   - blood culture NGTD from 2/16  - Aspiration precautions  - Wean O2 as tolerated  - incentive spirometry  - OOBTC Pt w/ worsening cough and voice hoarseness i/s/o daily emesis, found to have mild leukocytosis, neutrophilic predominance, and lactate of 2.5 w/ left lobe infiltrate on CXR. Initially hypotensive on presentation to ED 80s/60s. now improved after 2.5 L NS. Likely aspiration PNA 2/2 intractable vomiting vs CAP. s/p 1 dose of unasyn in the ED, started on ceftx 2g q24 which was transitioned to Zosyn with pseudomonal coverage due to concern for aspiration pneumonia post endoscopy.  Patient spiked fever 2/19 with increase O2 requirement to 50/100 weaned to 50/90. MRSA swab was negative. POCUS shwoing RV dilation. Patient was diuresed with 20 mg lasix   On 2/20 Increased O2 requirement at night requiring increasing the O2. WBC count wnl, Temp below Tmax in past 24 hrs, no need for cultures. Continue management as below     - C/w Zosyn 4.5 q4hrs (5 day course) today is day 3  - Blood culture (2/19) NGTD and sputum culture (2/20 - collected)   - blood culture NGTD from 2/16  - Aspiration precautions  - Wean O2 as tolerated  - incentive spirometry  - Chest PT   - OOBTC

## 2023-02-20 NOTE — PROGRESS NOTE ADULT - PROBLEM SELECTOR PLAN 1
#GIB  #Coffee-ground emesis  Pt w/ known hx of esophageal ulcer, diagnosed at [facility] in 1/2023, discharged on carafate and protonix with surgery f/u for hiatal hernia repair, now p/w 6 days of dark black emesis after drinking a glass of wine. Pt reports daily episodes of emesis, and has since developed a cough and hoarseness of her voice. Found to have mild leukocytosis, neutrophilic predominance, and lactate of 2.5 w/ left lobe infiltrate on CXR. Denies any bright red blood in her vomit, no abdominal pain, diarrhea, hematochezia, or melena. Denies any dizziness or lightheadedness.  Endoscopy findings:   A large size paraesophageal hiatal hernia was seen, the esophagogastric junction(EGJ) was noted at 40 cm, with hiatal narrowing at 34 cm from the incisors. Additional findings include ulceration. Retroflexion view in the stomach confirmed the size and morphology of the hernia.  -Grade D esophagitis with no bleeding was seen starting at 30 cm from the incisors in the lower third of the esophagus and middle third of the esophagus, compatible with nonspecific erosive esophagitis. This is likely the source of coffee ground emesis  -Localized irregularity of the mucosa was noted in the Z-line and gastroesophageal junction.  -Liquefied food material was found in the stomach. Findings were suggestive of gastroparesis/delayed gastric emptying.  -Patchy erythema of the mucosa with no bleeding was noted in the stomach body. These findings are compatible with non-erosive gastritis. Cold forceps biopsies were performed for H. pylori in the stomach body and stomach antrum.  -Normal appearing duodenum  Recs:   - On soft and bit sized   - Pantoprazole 40 mg PO BID    - Carafate Suspension 1g po qid     - Barium Swallow with tablet recommended  - protonix 40mg q12h  - carafate 1g oral sln q6hrs #GIB  #Coffee-ground emesis  Pt w/ known hx of esophageal ulcer, diagnosed at [facility] in 1/2023, discharged on carafate and protonix with surgery f/u for hiatal hernia repair, now p/w 6 days of dark black emesis after drinking a glass of wine. Pt reports daily episodes of emesis, and has since developed a cough and hoarseness of her voice. Found to have mild leukocytosis, neutrophilic predominance, and lactate of 2.5 w/ left lobe infiltrate on CXR. Denies any bright red blood in her vomit, no abdominal pain, diarrhea, hematochezia, or melena. Denies any dizziness or lightheadedness.  Endoscopy findings:   A large size paraesophageal hiatal hernia was seen, the esophagogastric junction(EGJ) was noted at 40 cm, with hiatal narrowing at 34 cm from the incisors. Additional findings include ulceration. Retroflexion view in the stomach confirmed the size and morphology of the hernia.  -Grade D esophagitis with no bleeding was seen starting at 30 cm from the incisors in the lower third of the esophagus and middle third of the esophagus, compatible with nonspecific erosive esophagitis. This is likely the source of coffee ground emesis  -Localized irregularity of the mucosa was noted in the Z-line and gastroesophageal junction.  -Liquefied food material was found in the stomach. Findings were suggestive of gastroparesis/delayed gastric emptying.  -Patchy erythema of the mucosa with no bleeding was noted in the stomach body. These findings are compatible with non-erosive gastritis. Cold forceps biopsies were performed for H. pylori in the stomach body and stomach antrum.  -Normal appearing duodenum  Recs:    Pantoprazole 40 mg PO BID x 2 weeks then daily  - Carafate Suspension 1g po qid  x 2 weeks

## 2023-02-20 NOTE — PROGRESS NOTE ADULT - SUBJECTIVE AND OBJECTIVE BOX
Physical Medicine and Rehabilitation Progress Note :       Patient is a 77y old  Female who presents with a chief complaint of Aspiration PNA, hematemesis w/ known esophageal ulcer (20 Feb 2023 06:12)      HPI:  78 y/o F PMH of hiatal hernia, esophageal ulcer (diagnosed at [facility] in 1/2023), HLD, depression (on aripiprazole and paroxetine), and seizure disorder (last seizure 2 years ago, on lamotrigine), p/w 6 days of dark black emesis after drinking a glass of wine last Friday. Pt was recently evaluated at [facility] for similar symptoms, found to have an esophageal ulcer on EGD, and was discharged on carafate and protonix.  She has since had daily episodes of dark black emesis, dry cough, and hoarseness of her voice. She also c/o decreased appetite and general malaise. She denies any abdominal pain, shortness of breath, diarrhea, chest pain, palpitations, headaches, dizziness, dysuria, constipation, melena, or hematochezia.     In the ED:  Initial vital signs: T: 96.9 F, HR: 84, BP: 87/62--> 110/56 s/p, R: 18, SpO2: 93% on RA--> 100% on 3L  Labs: WBC 11.43, Hgb 9.0, MCV 94.7 Neutrophils 10.33, lactate 2.5-->0.9, K 3.3, BUN 44, Cr 1.77, pH 7.28  CXR: Left lung consolidation  Medications: unasyn x1, protonix IV x1, 2.5 L NS     (16 Feb 2023 17:07)                            7.9    10.26 )-----------( 230      ( 20 Feb 2023 05:30 )             25.9       02-20    137  |  98  |  11  ----------------------------<  124<H>  4.2   |  30  |  0.79    Ca    8.9      20 Feb 2023 05:30  Phos  3.5     02-20  Mg     1.8     02-20    TPro  4.7<L>  /  Alb  2.4<L>  /  TBili  0.2  /  DBili  x   /  AST  90<H>  /  ALT  81<H>  /  AlkPhos  133<H>  02-20    Vital Signs Last 24 Hrs  T(C): 36.7 (20 Feb 2023 10:33), Max: 38 (20 Feb 2023 01:00)  T(F): 98 (20 Feb 2023 10:33), Max: 100.4 (20 Feb 2023 01:00)  HR: 82 (20 Feb 2023 12:48) (64 - 84)  BP: 92/50 (20 Feb 2023 12:48) (91/51 - 109/55)  BP(mean): 67 (20 Feb 2023 12:48) (67 - 78)  RR: 18 (20 Feb 2023 12:48) (18 - 22)  SpO2: 92% (20 Feb 2023 12:48) (90% - 100%)    Parameters below as of 20 Feb 2023 12:48  Patient On (Oxygen Delivery Method): nasal cannula, high flow  O2 Flow (L/min): 60  O2 Concentration (%): 50    MEDICATIONS  (STANDING):  ARIPiprazole 2 milliGRAM(s) Oral every 24 hours  guaiFENesin Oral Liquid (Sugar-Free) 100 milliGRAM(s) Oral every 6 hours  lamoTRIgine 100 milliGRAM(s) Oral every 24 hours  pantoprazole    Tablet 40 milliGRAM(s) Oral every 12 hours  PARoxetine 30 milliGRAM(s) Oral every 24 hours  piperacillin/tazobactam IVPB.. 4.5 Gram(s) IV Intermittent every 8 hours  sucralfate suspension 1 Gram(s) Oral every 6 hours    MEDICATIONS  (PRN):  acetaminophen     Tablet .. 650 milliGRAM(s) Oral every 6 hours PRN Temp greater or equal to 38C (100.4F)  ondansetron Injectable 4 milliGRAM(s) IV Push every 8 hours PRN Nausea and/or Vomiting        Initial Occupational Therapy Functional Status Assessment :     Previous Level of Function:     · Bed Mobility/Transfers	independent  · Bathing	independent  · Upper Body Dressing	independent  · Lower Body Dressing	independent  · Grooming	independent  · Toileting	independent  · Eating	independent  · Home Management Skills	needed assist  · Additional Comments	Pt reports that she lives in an elevator access apartment with 0STE. Pt reports that prior to admission, pt was independent in all ADLs and IADLs, and ambulates with no device inside, utilizes a rollator in the community. Pt also has a SC. Pt reports she has a HHA 3 days/wk, 4hrs/day to assist with IADLs such as shopping, cleaning, and cooking.      Cognitive Status Examination:   · Level of Consciousness	alert; confused  · Orientation	oriented to person, place, time and situation  · Follow Commands/Answers Questions	75% of the time; able to follow single-step instructions  · Personal Safety and Judgment	impaired  · Short Term Memory	intact  · Long Term Memory	intact    Range of Motion Exam:   · Range of Motion Examination, Upper Extremity	bilateral UE Active ROM was WFL  (within functional limits); bilateral UE Passive ROM was WFL  (within functional limits)  · Range of Motion Examination, Lower Extremity	bilateral LE Active ROM was WFL  (within functional limits); bilateral LE Passive ROM was WFL  (within functional limits)    Manual Muscle Testing:   · Manual Muscle Testing Results	BUE shoulder flexion grossly 4-/5, BUE elbow flexion/extension grossly 4/5. BUE  strength grossly 4/5. BLE grossly 3/5 based on functional assessment.    Muscle Tone Assessment:   · Muscle Tone Assessment	bilateral upper extremities; bilateral lower extremities; normal    Bed Mobility: Rolling/Turning:     · Level of [place]	maximum assist (25% patients effort)  · Physical Assist/Nonphysical Assist	nonverbal cues (demo/gestures); verbal cues; 2 person assist; to roll to assist with pericare after bowel movement (Diarrhea)    Bed Mobility: Supine to Sit:     · Level of [place]	deferred 2/2 pt requiring hygiene and sheet change from nursing staff as pt found to have liquid diarrhea    Bed Mobility Analysis:     · Bed Mobility Limitations	decreased ability to use arms for pushing/pulling; decreased ability to use legs for bridging/pushing; impaired ability to control trunk for mobility    Sensory Examination:     Grossly Intact:   · Gross Sensory Examination	Grossly Intact; Left UE; Right UE; Left LE; Right LE      Fine Motor Coordination:     Grossly Intact:   · Grossly Intact	Left UE; Right UE      Fine Motor Coordination:   · Left Hand Thumb/Finger Opposition Skills	normal performance  · Right Hand Thumb/Finger Opposition Skills	normal performance    Upper Body Dressing Training:     · Level of [place]	moderate assist (50% patients effort)  · Physical Assist/Nonphysical Assist	nonverbal cues (demo/gestures); 1 person assist; verbal cues    Toilet Hygiene Training:     · Level of [place]	maximum assist (25% patients effort)  · Physical Assist/Nonphysical Assist	1 person assist; nonverbal cues (demo/gestures); verbal cues; requiring max A for pericare after bowel movement semisupine in bed (liquid diarrhea noted, RN Alexander made aware).      PM&R Impression : as above    Current Disposition Plan Recommendations :    subacute rehab placement

## 2023-02-20 NOTE — PROGRESS NOTE ADULT - PROBLEM SELECTOR PLAN 3
Hgb 9.0 unknown baseline,  MCV 94 2/2 to active hematemesis i/s/o known esophageal ulcer    - Obtain iron studies   - Trend CBC  - Maintain active T&S (order for 2/18)  - transfuse if Hgb <7 Hgb 9.0 unknown baseline,  MCV 94 2/2 to active hematemesis i/s/o known esophageal ulcer    - F/u iron studies   - Trend CBC  - Maintain active T&S (order for 2/18)  - transfuse if Hgb <7

## 2023-02-20 NOTE — PROGRESS NOTE ADULT - ASSESSMENT
76 y/o F w/ hx of hiatal hernia and esophageal ulcer, presenting with 6 days of dark black emesis, hoarseness, and anorexia, admitted for UGIB and aspiration PNA.  76 y/o F w/ hx of hiatal hernia and esophageal ulcer, presenting with 6 days of dark black emesis, hoarseness, and anorexia, admitted for UGIB and aspiration PNA. Patient has been on zosyn for 3 days for aspitation PNA treatment however has continued to spike fevers currently undergoing further work up

## 2023-02-20 NOTE — PROCEDURE NOTE - NSUS ED ADDITIONAL DETAIL1 FT
Grossly normal LV function. On short axis and apical 4 chamber views there is evidence of septal flattening with RV enlargement. No pericardial effusion Grossly normal LV function. On short axis and apical 4 chamber views there is evidence of septal flattening in diastole with RV enlargement. No pericardial effusion

## 2023-02-20 NOTE — PROGRESS NOTE ADULT - ASSESSMENT
per Internal Medicine    77 y o F w/ hx of hiatal hernia and esophageal ulcer, presenting with 6 days of dark black emesis, hoarseness, and anorexia, admitted for UGIB and aspiration PNA.       Problem/Plan - 1:  ·  Problem: Esophageal ulcer.   ·  Plan: #GIB  #Coffee-ground emesis  Pt w/ known hx of esophageal ulcer, diagnosed at [facility] in 1/2023, discharged on carafate and protonix with surgery f/u for hiatal hernia repair, now p/w 6 days of dark black emesis after drinking a glass of wine. Pt reports daily episodes of emesis, and has since developed a cough and hoarseness of her voice. Found to have mild leukocytosis, neutrophilic predominance, and lactate of 2.5 w/ left lobe infiltrate on CXR. Denies any bright red blood in her vomit, no abdominal pain, diarrhea, hematochezia, or melena. Denies any dizziness or lightheadedness.  Endoscopy findings:   A large size paraesophageal hiatal hernia was seen, the esophagogastric junction(EGJ) was noted at 40 cm, with hiatal narrowing at 34 cm from the incisors. Additional findings include ulceration. Retroflexion view in the stomach confirmed the size and morphology of the hernia.  -Grade D esophagitis with no bleeding was seen starting at 30 cm from the incisors in the lower third of the esophagus and middle third of the esophagus, compatible with nonspecific erosive esophagitis. This is likely the source of coffee ground emesis  -Localized irregularity of the mucosa was noted in the Z-line and gastroesophageal junction.  -Liquefied food material was found in the stomach. Findings were suggestive of gastroparesis/delayed gastric emptying.  -Patchy erythema of the mucosa with no bleeding was noted in the stomach body. These findings are compatible with non-erosive gastritis. Cold forceps biopsies were performed for H. pylori in the stomach body and stomach antrum.  -Normal appearing duodenum  Recs:   - On soft and bit sized   - Pantoprazole 40 mg PO BID    - Carafate Suspension 1g po qid     - Barium Swallow with tablet recommended  - protonix 40mg q12h  - carafate 1g oral sln q6hrs.    Problem/Plan - 2:  ·  Problem: Aspiration pneumonia of left lung.   ·  Plan: Pt w/ worsening cough and voice hoarseness i/s/o daily emesis, found to have mild leukocytosis, neutrophilic predominance, and lactate of 2.5 w/ left lobe infiltrate on CXR. Initially hypotensive on presentation to ED 80s/60s. now improved after 2.5 L NS. Likely aspiration PNA 2/2 intractable vomiting vs CAP. s/p 1 dose of unasyn in the ED, started on ceftx 2g q24 which was transitioned to Zosyn with pseudomonal coverage due to concern for aspiration pneumonia post endoscopy.  Patient spiked fever today with increase O2 requirement to 50/100 weaned to 50/90. MRSA swab was negative     - C/w Zosyn 4.5 q4hrs (5 day course)  - F/u blood culture (2/19) and sputum culture (2/19)  - blood culture NGTD from 2/16  - Aspiration precautions  - Wean O2 as tolerated  - incentive spirometry  - OOBTC.    Problem/Plan - 3:  ·  Problem: Anemia.   ·  Plan: Hgb 9.0 unknown baseline,  MCV 94 2/2 to active hematemesis i/s/o known esophageal ulcer    - Obtain iron studies   - Trend CBC  - Maintain active T&S (order for 2/18)  - transfuse if Hgb <7.    Problem/Plan - 4:  ·  Problem: Anxiety and depression.   ·  Plan: On home abilify 2 mg QD, paxil 40 qd, and lorazepam 1g PRN.  - C/w abilify and paxil  - Hold lorazepam.    Problem/Plan - 5:  ·  Problem: HTN (hypertension).   ·  Plan: On home losartan 100 mg QD.  - hold i/s/o hypotension, active UGIB  - restart as clinically indicated.    Problem/Plan - 6:  ·  Problem: HLD (hyperlipidemia).   ·  Plan: On home atorvastatin 20 mg  - c/w home meds.    Problem/Plan - 7:  ·  Problem: Prophylactic measure.   ·  Plan: F: none  E: replete K<4, Mg<2  N: Regular   VTE Prophylaxis: hold i/s/o active UGIB      GI: protonix ggt  C: DNR/DNI (new molst in chart)  D: RMF.

## 2023-02-21 DIAGNOSIS — I26.92 SADDLE EMBOLUS OF PULMONARY ARTERY WITHOUT ACUTE COR PULMONALE: ICD-10-CM

## 2023-02-21 DIAGNOSIS — I26.99 OTHER PULMONARY EMBOLISM WITHOUT ACUTE COR PULMONALE: ICD-10-CM

## 2023-02-21 LAB
ALBUMIN SERPL ELPH-MCNC: 2.3 G/DL — LOW (ref 3.3–5)
ALP SERPL-CCNC: 154 U/L — HIGH (ref 40–120)
ALT FLD-CCNC: 75 U/L — HIGH (ref 10–45)
ANION GAP SERPL CALC-SCNC: 8 MMOL/L — SIGNIFICANT CHANGE UP (ref 5–17)
APTT BLD: 108.5 SEC — HIGH (ref 27.5–35.5)
APTT BLD: 54.2 SEC — HIGH (ref 27.5–35.5)
APTT BLD: 90.2 SEC — HIGH (ref 27.5–35.5)
AST SERPL-CCNC: 47 U/L — HIGH (ref 10–40)
BILIRUB SERPL-MCNC: 0.2 MG/DL — SIGNIFICANT CHANGE UP (ref 0.2–1.2)
BLD GP AB SCN SERPL QL: NEGATIVE — SIGNIFICANT CHANGE UP
BUN SERPL-MCNC: 8 MG/DL — SIGNIFICANT CHANGE UP (ref 7–23)
CALCIUM SERPL-MCNC: 8.8 MG/DL — SIGNIFICANT CHANGE UP (ref 8.4–10.5)
CHLORIDE SERPL-SCNC: 96 MMOL/L — SIGNIFICANT CHANGE UP (ref 96–108)
CO2 SERPL-SCNC: 33 MMOL/L — HIGH (ref 22–31)
CREAT SERPL-MCNC: 0.65 MG/DL — SIGNIFICANT CHANGE UP (ref 0.5–1.3)
CULTURE RESULTS: SIGNIFICANT CHANGE UP
CULTURE RESULTS: SIGNIFICANT CHANGE UP
EGFR: 90 ML/MIN/1.73M2 — SIGNIFICANT CHANGE UP
FERRITIN SERPL-MCNC: 154 NG/ML — HIGH (ref 15–150)
GLUCOSE SERPL-MCNC: 119 MG/DL — HIGH (ref 70–99)
HCT VFR BLD CALC: 24.4 % — LOW (ref 34.5–45)
HCT VFR BLD CALC: 26.1 % — LOW (ref 34.5–45)
HGB BLD-MCNC: 7.5 G/DL — LOW (ref 11.5–15.5)
HGB BLD-MCNC: 7.9 G/DL — LOW (ref 11.5–15.5)
INR BLD: 1.17 — HIGH (ref 0.88–1.16)
INR BLD: 1.26 — HIGH (ref 0.88–1.16)
IRON SATN MFR SERPL: 12 % — LOW (ref 14–50)
IRON SATN MFR SERPL: 20 UG/DL — LOW (ref 30–160)
MAGNESIUM SERPL-MCNC: 1.7 MG/DL — SIGNIFICANT CHANGE UP (ref 1.6–2.6)
MCHC RBC-ENTMCNC: 29.4 PG — SIGNIFICANT CHANGE UP (ref 27–34)
MCHC RBC-ENTMCNC: 29.5 PG — SIGNIFICANT CHANGE UP (ref 27–34)
MCHC RBC-ENTMCNC: 30.3 GM/DL — LOW (ref 32–36)
MCHC RBC-ENTMCNC: 30.7 GM/DL — LOW (ref 32–36)
MCV RBC AUTO: 96.1 FL — SIGNIFICANT CHANGE UP (ref 80–100)
MCV RBC AUTO: 97 FL — SIGNIFICANT CHANGE UP (ref 80–100)
NRBC # BLD: 0 /100 WBCS — SIGNIFICANT CHANGE UP (ref 0–0)
NRBC # BLD: 0 /100 WBCS — SIGNIFICANT CHANGE UP (ref 0–0)
NT-PROBNP SERPL-SCNC: 1141 PG/ML — HIGH (ref 0–300)
PHOSPHATE SERPL-MCNC: 3.3 MG/DL — SIGNIFICANT CHANGE UP (ref 2.5–4.5)
PLATELET # BLD AUTO: 241 K/UL — SIGNIFICANT CHANGE UP (ref 150–400)
PLATELET # BLD AUTO: 270 K/UL — SIGNIFICANT CHANGE UP (ref 150–400)
POTASSIUM SERPL-MCNC: 3.6 MMOL/L — SIGNIFICANT CHANGE UP (ref 3.5–5.3)
POTASSIUM SERPL-SCNC: 3.6 MMOL/L — SIGNIFICANT CHANGE UP (ref 3.5–5.3)
PROT SERPL-MCNC: 4.9 G/DL — LOW (ref 6–8.3)
PROTHROM AB SERPL-ACNC: 13.9 SEC — HIGH (ref 10.5–13.4)
PROTHROM AB SERPL-ACNC: 15 SEC — HIGH (ref 10.5–13.4)
RBC # BLD: 2.54 M/UL — LOW (ref 3.8–5.2)
RBC # BLD: 2.69 M/UL — LOW (ref 3.8–5.2)
RBC # FLD: 15 % — HIGH (ref 10.3–14.5)
RBC # FLD: 15.1 % — HIGH (ref 10.3–14.5)
RH IG SCN BLD-IMP: POSITIVE — SIGNIFICANT CHANGE UP
SODIUM SERPL-SCNC: 137 MMOL/L — SIGNIFICANT CHANGE UP (ref 135–145)
SPECIMEN SOURCE: SIGNIFICANT CHANGE UP
SPECIMEN SOURCE: SIGNIFICANT CHANGE UP
TIBC SERPL-MCNC: 164 UG/DL — LOW (ref 220–430)
TRANSFERRIN SERPL-MCNC: 159 MG/DL — LOW (ref 200–360)
TROPONIN T SERPL-MCNC: <0.01 NG/ML — SIGNIFICANT CHANGE UP (ref 0–0.01)
UIBC SERPL-MCNC: 144 UG/DL — SIGNIFICANT CHANGE UP (ref 110–370)
WBC # BLD: 8.63 K/UL — SIGNIFICANT CHANGE UP (ref 3.8–10.5)
WBC # BLD: 9.19 K/UL — SIGNIFICANT CHANGE UP (ref 3.8–10.5)
WBC # FLD AUTO: 8.63 K/UL — SIGNIFICANT CHANGE UP (ref 3.8–10.5)
WBC # FLD AUTO: 9.19 K/UL — SIGNIFICANT CHANGE UP (ref 3.8–10.5)

## 2023-02-21 PROCEDURE — 93970 EXTREMITY STUDY: CPT | Mod: 26

## 2023-02-21 PROCEDURE — 93306 TTE W/DOPPLER COMPLETE: CPT | Mod: 26

## 2023-02-21 PROCEDURE — 99223 1ST HOSP IP/OBS HIGH 75: CPT | Mod: GC

## 2023-02-21 PROCEDURE — 99233 SBSQ HOSP IP/OBS HIGH 50: CPT | Mod: GC

## 2023-02-21 PROCEDURE — 99222 1ST HOSP IP/OBS MODERATE 55: CPT

## 2023-02-21 PROCEDURE — 93308 TTE F-UP OR LMTD: CPT | Mod: 26,59

## 2023-02-21 RX ORDER — MAGNESIUM SULFATE 500 MG/ML
1 VIAL (ML) INJECTION ONCE
Refills: 0 | Status: COMPLETED | OUTPATIENT
Start: 2023-02-21 | End: 2023-02-21

## 2023-02-21 RX ORDER — POTASSIUM CHLORIDE 20 MEQ
40 PACKET (EA) ORAL ONCE
Refills: 0 | Status: COMPLETED | OUTPATIENT
Start: 2023-02-21 | End: 2023-02-21

## 2023-02-21 RX ORDER — HEPARIN SODIUM 5000 [USP'U]/ML
1700 INJECTION INTRAVENOUS; SUBCUTANEOUS
Qty: 25000 | Refills: 0 | Status: DISCONTINUED | OUTPATIENT
Start: 2023-02-21 | End: 2023-02-22

## 2023-02-21 RX ADMIN — PANTOPRAZOLE SODIUM 40 MILLIGRAM(S): 20 TABLET, DELAYED RELEASE ORAL at 05:52

## 2023-02-21 RX ADMIN — Medication 1 GRAM(S): at 19:01

## 2023-02-21 RX ADMIN — Medication 1 GRAM(S): at 11:40

## 2023-02-21 RX ADMIN — PANTOPRAZOLE SODIUM 40 MILLIGRAM(S): 20 TABLET, DELAYED RELEASE ORAL at 19:01

## 2023-02-21 RX ADMIN — PIPERACILLIN AND TAZOBACTAM 25 GRAM(S): 4; .5 INJECTION, POWDER, LYOPHILIZED, FOR SOLUTION INTRAVENOUS at 13:35

## 2023-02-21 RX ADMIN — Medication 650 MILLIGRAM(S): at 01:45

## 2023-02-21 RX ADMIN — Medication 100 MILLIGRAM(S): at 11:41

## 2023-02-21 RX ADMIN — Medication 30 MILLIGRAM(S): at 09:44

## 2023-02-21 RX ADMIN — HEPARIN SODIUM 15 UNIT(S)/HR: 5000 INJECTION INTRAVENOUS; SUBCUTANEOUS at 19:12

## 2023-02-21 RX ADMIN — PIPERACILLIN AND TAZOBACTAM 25 GRAM(S): 4; .5 INJECTION, POWDER, LYOPHILIZED, FOR SOLUTION INTRAVENOUS at 21:09

## 2023-02-21 RX ADMIN — Medication 100 MILLIGRAM(S): at 19:01

## 2023-02-21 RX ADMIN — ARIPIPRAZOLE 2 MILLIGRAM(S): 15 TABLET ORAL at 11:39

## 2023-02-21 RX ADMIN — LAMOTRIGINE 100 MILLIGRAM(S): 25 TABLET, ORALLY DISINTEGRATING ORAL at 09:46

## 2023-02-21 RX ADMIN — HEPARIN SODIUM 17 UNIT(S)/HR: 5000 INJECTION INTRAVENOUS; SUBCUTANEOUS at 06:26

## 2023-02-21 RX ADMIN — Medication 40 MILLIEQUIVALENT(S): at 07:27

## 2023-02-21 RX ADMIN — Medication 1 GRAM(S): at 00:01

## 2023-02-21 RX ADMIN — Medication 1 GRAM(S): at 06:12

## 2023-02-21 RX ADMIN — Medication 100 MILLIGRAM(S): at 05:51

## 2023-02-21 RX ADMIN — Medication 100 GRAM(S): at 08:03

## 2023-02-21 RX ADMIN — HEPARIN SODIUM 17 UNIT(S)/HR: 5000 INJECTION INTRAVENOUS; SUBCUTANEOUS at 13:41

## 2023-02-21 RX ADMIN — PIPERACILLIN AND TAZOBACTAM 25 GRAM(S): 4; .5 INJECTION, POWDER, LYOPHILIZED, FOR SOLUTION INTRAVENOUS at 05:52

## 2023-02-21 NOTE — PROGRESS NOTE ADULT - SUBJECTIVE AND OBJECTIVE BOX
77F PMHx of hiatal hernia, esophageal ulcer (diagnosed at [facility] in 1/2023), HLD, depression (on aripiprazole and paroxetine), and seizure disorder (last seizure 2 years ago, on lamotrigine), presented to [facility] ED after 6 days of dark black emesis and c/f aspiration PNA. Symptoms began after drinking a glass of wine, and have persisted. Of note, patient presented to [facility] with similar symptoms and hospital stay was prolonged due to weaning off supplemental oxygen. GI consulted. Patient was intubated for EGD, which found grade D esophagitis, ulceration, and a large hiatal hernia. Patient was extubated to NRB, SpO2 94%. Attempted to wean to 6LNC, SpO2 84%. ICU was consulted for AHRF, recommending telemetry for HFNC. Patient placed on HFNC and stepped up to 7Lachmann. In 7lachmann she continued to spike fevers     OVERNIGHT EVENTS:    SUBJECTIVE / INTERVAL HPI: Patient seen and examined at bedside.     VITAL SIGNS:  Vital Signs Last 24 Hrs  T(C): 37.3 (21 Feb 2023 17:29), Max: 38.2 (21 Feb 2023 01:00)  T(F): 99.2 (21 Feb 2023 17:29), Max: 100.7 (21 Feb 2023 01:00)  HR: 64 (21 Feb 2023 16:15) (58 - 82)  BP: 127/72 (21 Feb 2023 16:15) (94/55 - 127/72)  BP(mean): 93 (21 Feb 2023 16:15) (68 - 93)  RR: 18 (21 Feb 2023 16:15) (18 - 22)  SpO2: 97% (21 Feb 2023 16:15) (80% - 100%)    Parameters below as of 21 Feb 2023 16:15  Patient On (Oxygen Delivery Method): nasal cannula, high flow  O2 Flow (L/min): 60  O2 Concentration (%): 100    PHYSICAL EXAM:    General: WDWN  HEENT: NCAT; PERRL, anicteric sclera; MMM  Neck: supple, trachea midline  Cardiovascular: S1, S2 normal; RRR, no M/G/R  Respiratory: CTABL; no W/R/R  Gastrointestinal: soft, nontender, nondistended. bowel sounds present.  Skin: no ulcerations or visible rashes appreciated  Extremities: WWP; no edema, clubbing or cyanosis  Vascular: 2+ radial, DP/PT pulses B/L  Neurological: AAOx3; CN II-XII grossly intact; no focal deficits    MEDICATIONS:  MEDICATIONS  (STANDING):  ARIPiprazole 2 milliGRAM(s) Oral every 24 hours  guaiFENesin Oral Liquid (Sugar-Free) 100 milliGRAM(s) Oral every 6 hours  heparin  Infusion 1700 Unit(s)/Hr (17 mL/Hr) IV Continuous <Continuous>  lamoTRIgine 100 milliGRAM(s) Oral every 24 hours  pantoprazole    Tablet 40 milliGRAM(s) Oral every 12 hours  PARoxetine 30 milliGRAM(s) Oral every 24 hours  piperacillin/tazobactam IVPB.. 4.5 Gram(s) IV Intermittent every 8 hours  sucralfate suspension 1 Gram(s) Oral every 6 hours    MEDICATIONS  (PRN):  acetaminophen     Tablet .. 650 milliGRAM(s) Oral every 6 hours PRN Temp greater or equal to 38C (100.4F)  ondansetron Injectable 4 milliGRAM(s) IV Push every 8 hours PRN Nausea and/or Vomiting      ALLERGIES:  Allergies    lanolin topical (Rash)    Intolerances        LABS:                        7.5    9.19  )-----------( 241      ( 21 Feb 2023 05:53 )             24.4     02-21    137  |  96  |  8   ----------------------------<  119<H>  3.6   |  33<H>  |  0.65    Ca    8.8      21 Feb 2023 05:53  Phos  3.3     02-21  Mg     1.7     02-21    TPro  4.9<L>  /  Alb  2.3<L>  /  TBili  0.2  /  DBili  x   /  AST  47<H>  /  ALT  75<H>  /  AlkPhos  154<H>  02-21    PT/INR - ( 21 Feb 2023 12:13 )   PT: 13.9 sec;   INR: 1.17          PTT - ( 21 Feb 2023 12:13 )  PTT:90.2 sec  Urinalysis Basic - ( 20 Feb 2023 09:22 )    Color: Yellow / Appearance: Clear / SG: >=1.030 / pH: x  Gluc: x / Ketone: Trace mg/dL  / Bili: Negative / Urobili: 0.2 E.U./dL   Blood: x / Protein: 30 mg/dL / Nitrite: NEGATIVE   Leuk Esterase: NEGATIVE / RBC: < 5 /HPF / WBC < 5 /HPF   Sq Epi: x / Non Sq Epi: 0-5 /HPF / Bacteria: Present /HPF      CAPILLARY BLOOD GLUCOSE          RADIOLOGY & ADDITIONAL TESTS: Reviewed. 77F PMHx of hiatal hernia, esophageal ulcer (diagnosed at [facility] in 1/2023), HLD, depression (on aripiprazole and paroxetine), and seizure disorder (last seizure 2 years ago, on lamotrigine), presented to [facility] ED after 6 days of dark black emesis and c/f aspiration PNA. Symptoms began after drinking a glass of wine, and have persisted. Of note, patient presented to [facility] with similar symptoms and hospital stay was prolonged due to weaning off supplemental oxygen. GI consulted. Patient was intubated for EGD, which found grade D esophagitis, ulceration, and a large hiatal hernia. Patient was extubated to NRB, SpO2 94%. Attempted to wean to 6LNC, SpO2 84%. ICU was consulted for AHRF, recommending telemetry for HFNC. Patient placed on HFNC and stepped up to 7Lachmann. In 7lachmann she continued to spike fevers intermittently with the last fever on 02/21 1AM (100.7).    OVERNIGHT EVENTS:    SUBJECTIVE / INTERVAL HPI: Patient seen and examined at bedside.     VITAL SIGNS:  Vital Signs Last 24 Hrs  T(C): 37.3 (21 Feb 2023 17:29), Max: 38.2 (21 Feb 2023 01:00)  T(F): 99.2 (21 Feb 2023 17:29), Max: 100.7 (21 Feb 2023 01:00)  HR: 64 (21 Feb 2023 16:15) (58 - 82)  BP: 127/72 (21 Feb 2023 16:15) (94/55 - 127/72)  BP(mean): 93 (21 Feb 2023 16:15) (68 - 93)  RR: 18 (21 Feb 2023 16:15) (18 - 22)  SpO2: 97% (21 Feb 2023 16:15) (80% - 100%)    Parameters below as of 21 Feb 2023 16:15  Patient On (Oxygen Delivery Method): nasal cannula, high flow  O2 Flow (L/min): 60  O2 Concentration (%): 100    PHYSICAL EXAM:    General: WDWN  HEENT: NCAT; PERRL, anicteric sclera; MMM  Neck: supple, trachea midline  Cardiovascular: S1, S2 normal; RRR, no M/G/R  Respiratory: CTABL; no W/R/R  Gastrointestinal: soft, nontender, nondistended. bowel sounds present.  Skin: no ulcerations or visible rashes appreciated  Extremities: WWP; no edema, clubbing or cyanosis  Vascular: 2+ radial, DP/PT pulses B/L  Neurological: AAOx3; CN II-XII grossly intact; no focal deficits    MEDICATIONS:  MEDICATIONS  (STANDING):  ARIPiprazole 2 milliGRAM(s) Oral every 24 hours  guaiFENesin Oral Liquid (Sugar-Free) 100 milliGRAM(s) Oral every 6 hours  heparin  Infusion 1700 Unit(s)/Hr (17 mL/Hr) IV Continuous <Continuous>  lamoTRIgine 100 milliGRAM(s) Oral every 24 hours  pantoprazole    Tablet 40 milliGRAM(s) Oral every 12 hours  PARoxetine 30 milliGRAM(s) Oral every 24 hours  piperacillin/tazobactam IVPB.. 4.5 Gram(s) IV Intermittent every 8 hours  sucralfate suspension 1 Gram(s) Oral every 6 hours    MEDICATIONS  (PRN):  acetaminophen     Tablet .. 650 milliGRAM(s) Oral every 6 hours PRN Temp greater or equal to 38C (100.4F)  ondansetron Injectable 4 milliGRAM(s) IV Push every 8 hours PRN Nausea and/or Vomiting      ALLERGIES:  Allergies    lanolin topical (Rash)    Intolerances        LABS:                        7.5    9.19  )-----------( 241      ( 21 Feb 2023 05:53 )             24.4     02-21    137  |  96  |  8   ----------------------------<  119<H>  3.6   |  33<H>  |  0.65    Ca    8.8      21 Feb 2023 05:53  Phos  3.3     02-21  Mg     1.7     02-21    TPro  4.9<L>  /  Alb  2.3<L>  /  TBili  0.2  /  DBili  x   /  AST  47<H>  /  ALT  75<H>  /  AlkPhos  154<H>  02-21    PT/INR - ( 21 Feb 2023 12:13 )   PT: 13.9 sec;   INR: 1.17          PTT - ( 21 Feb 2023 12:13 )  PTT:90.2 sec  Urinalysis Basic - ( 20 Feb 2023 09:22 )    Color: Yellow / Appearance: Clear / SG: >=1.030 / pH: x  Gluc: x / Ketone: Trace mg/dL  / Bili: Negative / Urobili: 0.2 E.U./dL   Blood: x / Protein: 30 mg/dL / Nitrite: NEGATIVE   Leuk Esterase: NEGATIVE / RBC: < 5 /HPF / WBC < 5 /HPF   Sq Epi: x / Non Sq Epi: 0-5 /HPF / Bacteria: Present /HPF      CAPILLARY BLOOD GLUCOSE          RADIOLOGY & ADDITIONAL TESTS: Reviewed. 77F PMHx of hiatal hernia, esophageal ulcer (diagnosed at [facility] in 1/2023), HLD, depression (on aripiprazole and paroxetine), and seizure disorder (last seizure 2 years ago, on lamotrigine), presented to [facility] ED after 6 days of dark black emesis and c/f aspiration PNA. Symptoms began after drinking a glass of wine, and have persisted. Of note, patient presented to [facility] with similar symptoms and hospital stay was prolonged due to weaning off supplemental oxygen. GI consulted. Patient was intubated for EGD, which found grade D esophagitis, ulceration, and a large hiatal hernia. Patient was extubated to NRB, SpO2 94%. Attempted to wean to 6LNC, SpO2 84%. ICU was consulted for AHRF, recommending telemetry for HFNC. Patient placed on HFNC and stepped up to 7Lachmann. In 7lachmann she continued to spike fevers intermittently with the last fever on 02/21 1AM (100.7),     OVERNIGHT EVENTS:    SUBJECTIVE / INTERVAL HPI: Patient seen and examined at bedside.     VITAL SIGNS:  Vital Signs Last 24 Hrs  T(C): 37.3 (21 Feb 2023 17:29), Max: 38.2 (21 Feb 2023 01:00)  T(F): 99.2 (21 Feb 2023 17:29), Max: 100.7 (21 Feb 2023 01:00)  HR: 64 (21 Feb 2023 16:15) (58 - 82)  BP: 127/72 (21 Feb 2023 16:15) (94/55 - 127/72)  BP(mean): 93 (21 Feb 2023 16:15) (68 - 93)  RR: 18 (21 Feb 2023 16:15) (18 - 22)  SpO2: 97% (21 Feb 2023 16:15) (80% - 100%)    Parameters below as of 21 Feb 2023 16:15  Patient On (Oxygen Delivery Method): nasal cannula, high flow  O2 Flow (L/min): 60  O2 Concentration (%): 100    PHYSICAL EXAM:    General: WDWN  HEENT: NCAT; PERRL, anicteric sclera; MMM  Neck: supple, trachea midline  Cardiovascular: S1, S2 normal; RRR, no M/G/R  Respiratory: CTABL; no W/R/R  Gastrointestinal: soft, nontender, nondistended. bowel sounds present.  Skin: no ulcerations or visible rashes appreciated  Extremities: WWP; no edema, clubbing or cyanosis  Vascular: 2+ radial, DP/PT pulses B/L  Neurological: AAOx3; CN II-XII grossly intact; no focal deficits    MEDICATIONS:  MEDICATIONS  (STANDING):  ARIPiprazole 2 milliGRAM(s) Oral every 24 hours  guaiFENesin Oral Liquid (Sugar-Free) 100 milliGRAM(s) Oral every 6 hours  heparin  Infusion 1700 Unit(s)/Hr (17 mL/Hr) IV Continuous <Continuous>  lamoTRIgine 100 milliGRAM(s) Oral every 24 hours  pantoprazole    Tablet 40 milliGRAM(s) Oral every 12 hours  PARoxetine 30 milliGRAM(s) Oral every 24 hours  piperacillin/tazobactam IVPB.. 4.5 Gram(s) IV Intermittent every 8 hours  sucralfate suspension 1 Gram(s) Oral every 6 hours    MEDICATIONS  (PRN):  acetaminophen     Tablet .. 650 milliGRAM(s) Oral every 6 hours PRN Temp greater or equal to 38C (100.4F)  ondansetron Injectable 4 milliGRAM(s) IV Push every 8 hours PRN Nausea and/or Vomiting      ALLERGIES:  Allergies    lanolin topical (Rash)    Intolerances        LABS:                        7.5    9.19  )-----------( 241      ( 21 Feb 2023 05:53 )             24.4     02-21    137  |  96  |  8   ----------------------------<  119<H>  3.6   |  33<H>  |  0.65    Ca    8.8      21 Feb 2023 05:53  Phos  3.3     02-21  Mg     1.7     02-21    TPro  4.9<L>  /  Alb  2.3<L>  /  TBili  0.2  /  DBili  x   /  AST  47<H>  /  ALT  75<H>  /  AlkPhos  154<H>  02-21    PT/INR - ( 21 Feb 2023 12:13 )   PT: 13.9 sec;   INR: 1.17          PTT - ( 21 Feb 2023 12:13 )  PTT:90.2 sec  Urinalysis Basic - ( 20 Feb 2023 09:22 )    Color: Yellow / Appearance: Clear / SG: >=1.030 / pH: x  Gluc: x / Ketone: Trace mg/dL  / Bili: Negative / Urobili: 0.2 E.U./dL   Blood: x / Protein: 30 mg/dL / Nitrite: NEGATIVE   Leuk Esterase: NEGATIVE / RBC: < 5 /HPF / WBC < 5 /HPF   Sq Epi: x / Non Sq Epi: 0-5 /HPF / Bacteria: Present /HPF      CAPILLARY BLOOD GLUCOSE          RADIOLOGY & ADDITIONAL TESTS: Reviewed. Hospital course:   77F PMHx of hiatal hernia, esophageal ulcer (diagnosed at [facility] in 1/2023), HLD, depression (on aripiprazole and paroxetine), and seizure disorder (last seizure 2 years ago, on lamotrigine), presented to [facility] ED after 6 days of dark black emesis and c/f aspiration PNA. Symptoms began after drinking a glass of wine, and have persisted. Of note, patient presented to [facility] with similar symptoms and hospital stay was prolonged due to weaning off supplemental oxygen. GI consulted. Patient was intubated for EGD, which found grade D esophagitis, ulceration, and a large hiatal hernia. Patient was extubated to NRB, SpO2 94%. Attempted to wean to 6LNC, SpO2 84%. ICU was consulted for AHRF, recommending telemetry for HFNC. Patient placed on HFNC and stepped up to 7Lachmann. In 7lachmann she continued to spike fevers intermittently with the last fever on 02/21 1AM (100.7), her oxygen requirement has also steadily increased despite being on zosyn 4.5g q8hrs. Bedside POCUS showed RV dialtion initially concerning for volume overload, patient diuresed but patient's respiratory status did not improve. She underwent CTPE which showed b/l pulmonary emboli with saddle embolus on the right with extension into multiple upper and lower lobe pulmonary arteries and LE doppler with Deep venous thrombosis in bilateral calf veins. Patient was started on heparin drip and PERT team and structural heart were consulted with tentative plan for thrombectomy vs IVC filter placement.    OVERNIGHT EVENTS: went for ctpe, had to increase fio2 to 100%. Pt found to have PE @ 2230,  PTT drawn. pt started on heparin ggt. spoke to PERT team. ordered TTE. cards fellow to do bedside echo. PERT team to see in AM.     SUBJECTIVE / INTERVAL HPI: Patient in bed not complaining of SOB, CP or palpitations    VITAL SIGNS:  Vital Signs Last 24 Hrs  T(C): 37.3 (21 Feb 2023 17:29), Max: 38.2 (21 Feb 2023 01:00)  T(F): 99.2 (21 Feb 2023 17:29), Max: 100.7 (21 Feb 2023 01:00)  HR: 64 (21 Feb 2023 16:15) (58 - 82)  BP: 127/72 (21 Feb 2023 16:15) (94/55 - 127/72)  BP(mean): 93 (21 Feb 2023 16:15) (68 - 93)  RR: 18 (21 Feb 2023 16:15) (18 - 22)  SpO2: 97% (21 Feb 2023 16:15) (80% - 100%)    Parameters below as of 21 Feb 2023 16:15  Patient On (Oxygen Delivery Method): nasal cannula, high flow  O2 Flow (L/min): 60  O2 Concentration (%): 100    PHYSICAL EXAM:    General: In bed in no acute distress on HFNC   HEENT: NCAT; PERRL, anicteric sclera; MMM  Neck: supple, trachea midline  Cardiovascular: S1, S2 normal; RRR, no M/G/R  Respiratory: crackles in the right lower lobe, with mildly decreased air entry in the bases   Gastrointestinal: soft, nontender, nondistended. bowel sounds present.  Skin: no ulcerations or visible rashes appreciated  Extremities: WWP; no edema, clubbing or cyanosis  Vascular: 2+ radial, DP/PT pulses B/L  Neurological: AAOx3; CN II-XII grossly intact; no focal deficits    MEDICATIONS:  MEDICATIONS  (STANDING):  ARIPiprazole 2 milliGRAM(s) Oral every 24 hours  guaiFENesin Oral Liquid (Sugar-Free) 100 milliGRAM(s) Oral every 6 hours  heparin  Infusion 1700 Unit(s)/Hr (17 mL/Hr) IV Continuous <Continuous>  lamoTRIgine 100 milliGRAM(s) Oral every 24 hours  pantoprazole    Tablet 40 milliGRAM(s) Oral every 12 hours  PARoxetine 30 milliGRAM(s) Oral every 24 hours  piperacillin/tazobactam IVPB.. 4.5 Gram(s) IV Intermittent every 8 hours  sucralfate suspension 1 Gram(s) Oral every 6 hours    MEDICATIONS  (PRN):  acetaminophen     Tablet .. 650 milliGRAM(s) Oral every 6 hours PRN Temp greater or equal to 38C (100.4F)  ondansetron Injectable 4 milliGRAM(s) IV Push every 8 hours PRN Nausea and/or Vomiting      ALLERGIES:  Allergies    lanolin topical (Rash)    Intolerances        LABS:                        7.5    9.19  )-----------( 241      ( 21 Feb 2023 05:53 )             24.4     02-21    137  |  96  |  8   ----------------------------<  119<H>  3.6   |  33<H>  |  0.65    Ca    8.8      21 Feb 2023 05:53  Phos  3.3     02-21  Mg     1.7     02-21    TPro  4.9<L>  /  Alb  2.3<L>  /  TBili  0.2  /  DBili  x   /  AST  47<H>  /  ALT  75<H>  /  AlkPhos  154<H>  02-21    PT/INR - ( 21 Feb 2023 12:13 )   PT: 13.9 sec;   INR: 1.17          PTT - ( 21 Feb 2023 12:13 )  PTT:90.2 sec  Urinalysis Basic - ( 20 Feb 2023 09:22 )    Color: Yellow / Appearance: Clear / SG: >=1.030 / pH: x  Gluc: x / Ketone: Trace mg/dL  / Bili: Negative / Urobili: 0.2 E.U./dL   Blood: x / Protein: 30 mg/dL / Nitrite: NEGATIVE   Leuk Esterase: NEGATIVE / RBC: < 5 /HPF / WBC < 5 /HPF   Sq Epi: x / Non Sq Epi: 0-5 /HPF / Bacteria: Present /HPF      CAPILLARY BLOOD GLUCOSE          RADIOLOGY & ADDITIONAL TESTS: Reviewed.

## 2023-02-21 NOTE — PROGRESS NOTE ADULT - PROBLEM SELECTOR PLAN 3
Hgb 9.0 unknown baseline,  MCV 94 2/2 to active hematemesis i/s/o known esophageal ulcer    - F/u iron studies   - Trend CBC  - Maintain active T&S (order for 2/18)  - transfuse if Hgb <7 Pt w/ worsening cough and voice hoarseness i/s/o daily emesis, found to have mild leukocytosis, neutrophilic predominance, and lactate of 2.5 w/ left lobe infiltrate on CXR. Initially hypotensive on presentation to ED 80s/60s. now improved after 2.5 L NS. Likely aspiration PNA 2/2 intractable vomiting vs CAP. s/p 1 dose of unasyn in the ED, started on ceftx 2g q24 which was transitioned to Zosyn with pseudomonal coverage due to concern for aspiration pneumonia post endoscopy.  Patient spiked fever 2/19 with increase O2 requirement to 50/100 weaned to 50/90. MRSA swab was negative. POCUS shwoing RV dilation. Patient was diuresed with 20 mg lasix   On 2/20 Increased O2 requirement at night requiring increasing the O2. WBC count wnl, Temp below Tmax in past 24 hrs, no need for cultures. Continue management as below     - C/w Zosyn 4.5 q4hrs (5 day course) today is day 4  - Blood culture (2/19) NGTD and sputum culture (2/20 - collected)   - blood culture NGTD from 2/16  - Aspiration precautions  - Wean O2 as tolerated  - incentive spirometry  - Chest PT   - OOBTC

## 2023-02-21 NOTE — CONSULT NOTE ADULT - ASSESSMENT
Hemodyanmic: [ ] Cardiac arrest, [ ] SBP < 90 mmHg, [ ] SBP < 90 mmHg > 15 min   O2 Required: [ ] None, [ ] NC, [ x ] HFNC, [ ] NIV, [ ] Mechcanical Ventilation   RV strain: [ ] Yes, [ ] No  [ ] Unprovoked, [ ] Provoked  Risk category: [ x ] Low Risk, [ ] Low risk submassive, [ ] High risk submassive, [ ] Massive   Anticoagulation: [ ] tPA, [ x ] heparin gtt, [ ] enoxaparin, [ ] apixaban   Catheter directed therapies: [ ] Catheter directed aspiration thrombectomy, [ ] Catheter directed thrombolytics  NPO: [ ] Yes, [ x ] No    76 y/o F PMH of hiatal hernia, esophageal ulcer (diagnosed at [facility] in 1/2023), HLD, depression (on aripiprazole and paroxetine), and seizure disorder (last seizure 2 years ago, on lamotrigine), p/w 6 days of dark black emesis, course complicated by post EGD respiratory failure 2/2 aspiration and subsequently found to have b/l PE for which PERT team was activated.     #PE    Found to have bilateral PE, likely provoked in setting of prolonged bedrest post procedure and contraindication to AC given GI bleed. Now on heparin gtt thus far tolerating. sPESI score 2 points (SBP < 100 and O2 sat < 90); any score greater than 0 is high risk. PESI score is +60, class V risk. Troponin T wnl with serum pro BNP elevated at 1141. Lactate not obtained. Bedside echo by overnight cardiology follow with Preserved RV systolic fx, TAPSE 2.8, S' 17cm/s, Severely dilated RV w/ severe TR and RVSP 59mmgHg.     Recommend:   - formal TTE   - c/w heparin gtt for now   - will screen for HI PEITHO

## 2023-02-21 NOTE — PROGRESS NOTE ADULT - ASSESSMENT
76 y/o F w/ hx of hiatal hernia and esophageal ulcer, presenting with 6 days of dark black emesis, hoarseness, and anorexia, admitted for UGIB and aspiration PNA. Patient has been on zosyn for 3 days for aspitation PNA treatment however has continued to spike fevers currently undergoing further work up

## 2023-02-21 NOTE — PROGRESS NOTE ADULT - PROBLEM SELECTOR PLAN 5
On home losartan 100 mg QD.  - hold i/s/o hypotension, active UGIB  - restart as clinically indicated On home abilify 2 mg QD, paxil 40 qd, and lorazepam 1g PRN.  - C/w abilify and paxil  - Hold lorazepam

## 2023-02-21 NOTE — PROGRESS NOTE ADULT - PROBLEM SELECTOR PLAN 4
On home abilify 2 mg QD, paxil 40 qd, and lorazepam 1g PRN.  - C/w abilify and paxil  - Hold lorazepam Hgb 9.0 unknown baseline,  MCV 94 2/2 to active hematemesis i/s/o known esophageal ulcer  Iron 20, Ferritin 154, total binding capacity 164, %sat 12    - Trend CBC  - Maintain active T&S (order for 2/18)  - transfuse if Hgb <7

## 2023-02-21 NOTE — DIETITIAN INITIAL EVALUATION ADULT - PERTINENT LABORATORY DATA
02-21    137  |  96  |  8   ----------------------------<  119<H>  3.6   |  33<H>  |  0.65    Ca    8.8      21 Feb 2023 05:53  Phos  3.3     02-21  Mg     1.7     02-21    TPro  4.9<L>  /  Alb  2.3<L>  /  TBili  0.2  /  DBili  x   /  AST  47<H>  /  ALT  75<H>  /  AlkPhos  154<H>  02-21

## 2023-02-21 NOTE — PROGRESS NOTE ADULT - PROBLEM SELECTOR PLAN 2
Pt w/ worsening cough and voice hoarseness i/s/o daily emesis, found to have mild leukocytosis, neutrophilic predominance, and lactate of 2.5 w/ left lobe infiltrate on CXR. Initially hypotensive on presentation to ED 80s/60s. now improved after 2.5 L NS. Likely aspiration PNA 2/2 intractable vomiting vs CAP. s/p 1 dose of unasyn in the ED, started on ceftx 2g q24 which was transitioned to Zosyn with pseudomonal coverage due to concern for aspiration pneumonia post endoscopy.  Patient spiked fever 2/19 with increase O2 requirement to 50/100 weaned to 50/90. MRSA swab was negative. POCUS shwoing RV dilation. Patient was diuresed with 20 mg lasix   On 2/20 Increased O2 requirement at night requiring increasing the O2. WBC count wnl, Temp below Tmax in past 24 hrs, no need for cultures. Continue management as below     - C/w Zosyn 4.5 q4hrs (5 day course) today is day 3  - Blood culture (2/19) NGTD and sputum culture (2/20 - collected)   - blood culture NGTD from 2/16  - Aspiration precautions  - Wean O2 as tolerated  - incentive spirometry  - Chest PT   - OOBTC #GIB  #Coffee-ground emesis  Pt w/ known hx of esophageal ulcer, diagnosed at [facility] in 1/2023, discharged on carafate and protonix with surgery f/u for hiatal hernia repair, now p/w 6 days of dark black emesis after drinking a glass of wine. Pt reports daily episodes of emesis, and has since developed a cough and hoarseness of her voice. Found to have mild leukocytosis, neutrophilic predominance, and lactate of 2.5 w/ left lobe infiltrate on CXR. Denies any bright red blood in her vomit, no abdominal pain, diarrhea, hematochezia, or melena. Denies any dizziness or lightheadedness.  Endoscopy findings:   A large size paraesophageal hiatal hernia was seen, the esophagogastric junction(EGJ) was noted at 40 cm, with hiatal narrowing at 34 cm from the incisors. Additional findings include ulceration. Retroflexion view in the stomach confirmed the size and morphology of the hernia.  -Grade D esophagitis with no bleeding was seen starting at 30 cm from the incisors in the lower third of the esophagus and middle third of the esophagus, compatible with nonspecific erosive esophagitis. This is likely the source of coffee ground emesis  -Localized irregularity of the mucosa was noted in the Z-line and gastroesophageal junction.  -Liquefied food material was found in the stomach. Findings were suggestive of gastroparesis/delayed gastric emptying.  -Patchy erythema of the mucosa with no bleeding was noted in the stomach body. These findings are compatible with non-erosive gastritis. Cold forceps biopsies were performed for H. pylori in the stomach body and stomach antrum.  -Normal appearing duodenum  Recs:   - Pantoprazole 40 mg PO BID x 2 weeks then daily  - Carafate Suspension 1g po qid  x 2 weeks

## 2023-02-21 NOTE — DIETITIAN INITIAL EVALUATION ADULT - ADD RECOMMEND
-Return to PO diet as medically able   *Recommend regular diet with textures/consistencies per SLP   -Encourage good PO intake as appropriate   -Monitor chemistry, GI fxn, and skin integrity

## 2023-02-21 NOTE — PROGRESS NOTE ADULT - PROBLEM SELECTOR PLAN 6
On home atorvastatin 20 mg  - c/w home meds On home losartan 100 mg QD.  - hold i/s/o hypotension, active UGIB  - restart as clinically indicated

## 2023-02-21 NOTE — PROGRESS NOTE ADULT - PROBLEM SELECTOR PLAN 8
F: none  E: replete K<4, Mg<2  N: NPO at midnight   VTE Prophylaxis: hold i/s/o active UGIB  GI: protonix ggt  C: DNR/DNI (new molst in chart)  D: RMF

## 2023-02-21 NOTE — CHART NOTE - NSCHARTNOTEFT_GEN_A_CORE
Ltd echo for PE    Nl LV fxn  Preserved RV systolic fx, TAPSE 2.8, S' 17cm/s  Severely dilated RV w/ severe TR   RVSP 59mmgHg  Trace AI  systolic septal flattening consistent w/ RV pressure overload

## 2023-02-21 NOTE — CONSULT NOTE ADULT - SUBJECTIVE AND OBJECTIVE BOX
INCOMPLETE NOTE    Surgeon: Dr. Dickens    Requesting Physician: Dr. Costello    HISTORY OF PRESENT ILLNESS:  76 YO Female w/ PMHx of hiatal hernia, esophageal ulcer (diagnosed at [facility] in 1/2023), HLD, depression, and seizure disorder (last seizure 2 yrs ago) who originally presented to [facility] on 2/16 c/o dark emesis x 6 days, admitted for work-up of GIB. Pt underwent EGD on 2/17 w/ GI which revealed large paraesophageal hiatal hernia and nonspecific erosive esophagitis, which was likely source of coffee ground emesis. Presently being treated w/ PPI and carafate. Pt had increased O2 requirements and started on IV Zosyn for suspected ASA PNA. Underwent CTA Chest 2/20 which revealed numerous bilateral pulmonary emboli, saddle embolus on the right with extension into multiple upper and lower lobe pulmonary arteries. Bedside TTE by cardio fellow 2/21: nml LV function, preserved RV systolic function, dilated RV w/ severe TR, trace AI, RV pressure overload. Structural heart team consulted for PERT. Formal TTE and U/S LE dopplers pending.     PAST MEDICAL & SURGICAL HISTORY:  Hiatal hernia    High cholesterol    History of esophageal ulcer    HTN (hypertension)    Major depression    Seizure disorder    Anxiety    No significant past surgical history    MEDICATIONS  (STANDING):  ARIPiprazole 2 milliGRAM(s) Oral every 24 hours  guaiFENesin Oral Liquid (Sugar-Free) 100 milliGRAM(s) Oral every 6 hours  heparin  Infusion 1700 Unit(s)/Hr (17 mL/Hr) IV Continuous <Continuous>  lamoTRIgine 100 milliGRAM(s) Oral every 24 hours  pantoprazole    Tablet 40 milliGRAM(s) Oral every 12 hours  PARoxetine 30 milliGRAM(s) Oral every 24 hours  piperacillin/tazobactam IVPB.. 4.5 Gram(s) IV Intermittent every 8 hours  sucralfate suspension 1 Gram(s) Oral every 6 hours    MEDICATIONS  (PRN):  acetaminophen     Tablet .. 650 milliGRAM(s) Oral every 6 hours PRN Temp greater or equal to 38C (100.4F)  ondansetron Injectable 4 milliGRAM(s) IV Push every 8 hours PRN Nausea and/or Vomiting    Allergies    lanolin topical (Rash)    Intolerances    SOCIAL HISTORY:  Lives with a roommate, , never smoker, no illicit drug use, occasional glass of wine, Carries out all ADLs independently. Uses rollator at baseline    FAMILY HISTORY:  No pertinent family history in first degree relatives    Review of Systems:  CONSTITUTIONAL: Denies fevers / chills, sweats, fatigue, weight loss, weight gain                                       NEURO:  Denies parathesias, seizures, syncope, confusion                                                                                  EYES:  Denies blurry vision, discharge, pain, loss of vision                                                                                    ENMT:  Denies difficulty hearing, vertigo, dysphagia, epistaxis, recent dental work                                       CV:  Denies chest pain, palpitations, JEAN BAPTISTE, orthopnea                                                                                           RESPIRATORY:  Denies wWheezing, SOB, cough / sputum, hemoptysis                                                               GI:  Denies nausea, vomiting, diarrhea, constipation, melena                                                                          : Denies hematuria, dysuria, urgency, incontinence                                                                                          MUSKULOSKELETAL:  Denies arthritis, joint swelling, muscle weakness                                                             SKIN/BREAST:  Denies rash, itching, hair loss, masses                                                                                              PSYCH:  Denies depression, anxiety, suicidal ideation                                                                                                HEME/LYMPH:  Denies bruises easily, enlarged lymph nodes, tender lymph nodes                                          ENDOCRINE:  Denies cold intolerance, heat intolerance, polydipsia                                                                      Vital Signs Last 24 Hrs  T(C): 37 (21 Feb 2023 10:00), Max: 38.2 (21 Feb 2023 01:00)  T(F): 98.6 (21 Feb 2023 10:00), Max: 100.7 (21 Feb 2023 01:00)  HR: 72 (21 Feb 2023 08:20) (58 - 82)  BP: 122/57 (21 Feb 2023 08:10) (92/50 - 122/57)  BP(mean): 82 (21 Feb 2023 08:10) (67 - 82)  RR: 20 (21 Feb 2023 08:20) (18 - 22)  SpO2: 91% (21 Feb 2023 08:20) (80% - 100%)    Parameters below as of 21 Feb 2023 08:20  Patient On (Oxygen Delivery Method): nasal cannula, high flow  O2 Flow (L/min): 60  O2 Concentration (%): 100    PHYSICAL EXAM:  GENERAL: NAD, lying in bed comfortably  HEAD:  Atraumatic, Normocephalic  EYES: EOMI, PERRLA, conjunctiva and sclera clear  ENT: Moist mucous membranes  NECK: Supple, No JVD  CHEST/LUNG: Clear to auscultation bilaterally; No rales, rhonchi, wheezing, or rubs. Unlabored respirations  HEART: Regular rate and rhythm; No murmurs, rubs, or gallops  ABDOMEN: Bowel sounds present; Soft, Nontender, Nondistended. No hepatomegally  EXTREMITIES:  2+ Peripheral Pulses, brisk capillary refill. No clubbing, cyanosis, or edema  NERVOUS SYSTEM:  Alert & Oriented X3, speech clear. No deficits   MSK: FROM all 4 extremities, full and equal strength  SKIN: No rashes or lesions    LABS:                        7.5    9.19  )-----------( 241      ( 21 Feb 2023 05:53 )             24.4     02-21    137  |  96  |  8   ----------------------------<  119<H>  3.6   |  33<H>  |  0.65    Ca    8.8      21 Feb 2023 05:53  Phos  3.3     02-21  Mg     1.7     02-21    TPro  4.9<L>  /  Alb  2.3<L>  /  TBili  0.2  /  DBili  x   /  AST  47<H>  /  ALT  75<H>  /  AlkPhos  154<H>  02-21    PT/INR - ( 21 Feb 2023 05:53 )   PT: 15.0 sec;   INR: 1.26       PTT - ( 21 Feb 2023 05:53 )  PTT:54.2 sec  Urinalysis Basic - ( 20 Feb 2023 09:22 )    Color: Yellow / Appearance: Clear / SG: >=1.030 / pH: x  Gluc: x / Ketone: Trace mg/dL  / Bili: Negative / Urobili: 0.2 E.U./dL   Blood: x / Protein: 30 mg/dL / Nitrite: NEGATIVE   Leuk Esterase: NEGATIVE / RBC: < 5 /HPF / WBC < 5 /HPF   Sq Epi: x / Non Sq Epi: 0-5 /HPF / Bacteria: Present /HPF    CARDIAC MARKERS ( 20 Feb 2023 05:30 )  x     / <0.01 ng/mL / x     / x     / x        RADIOLOGY & ADDITIONAL STUDIES:  CXR: < from: Xray Chest 1 View- PORTABLE-Urgent (Xray Chest 1 View- PORTABLE-Urgent .) (02.19.23 @ 06:56) >  FINDINGS/  IMPRESSION: No substantial interval change compared to prior chest   radiograph.    CT Scan:  < from: CT Angio Chest PE Protocol w/ IV Cont (02.20.23 @ 22:06) >  FINDINGS:  Pulmonary arteries: Pulmonary arterial enlargement. Numerous bilateral  pulmonary emboli. There are pulmonary emboli within arteriesof the   lingula.  There is a saddle embolus within the right main pulmonary artery,   extending  into multiple right lower lobe pulmonary arterial branches. There are   multiple  pulmonary emboli involving the right upper lobe pulmonary arterial   branches.  Aorta: There is calcification of the aorta and the aortic branches.    Lungs: There is a ground-glass pulmonary infiltrate with interlobular   septal  thickening involving the right upper lobe. There is marked volume loss   with  atelectasisand or consolidation involving the right lower lobe. This is  associated with air bronchogram formation. There are scattered areas of  ground-glass pulmonary opacity and interstitial thickening involving the   left  upper lobe. There is volume loss with near complete atelectasis and  consolidation involving the left lower lobe. There is air bronchogram  formation.  Pleural spaces: Bilateral pleural effusions, left-greater-than-right.  Heart: Cardiomegaly.  Heart RV/LV ratio: The RV-LV ratio is normal, 0.96.  Coronary arteries: There is coronary artery calcification.  Lymph nodes: Unremarkable. No enlarged lymph nodes.    Stomach and bowel: Intrathoracic stomach.  Bones/joints: Degenerative and hypertrophic changes of the visualized   osseous  structures. There is exaggeration of the normal thoracic kyphosis. There   are  multiple age indeterminate compression fractures. There is compression  deformity involving the T4, T8, and T12 vertebra.There is   demineralization of  the visualized osseous structures.  Soft tissues: Unremarkable.    IMPRESSION:  1. Numerous bilateral pulmonary emboli. There is a saddle embolus on the   right  with extension into multiple upper and lower lobe pulmonary arteries.  2. Ground-glass pulmonary infiltrate within the right upper lobe. This   may be  secondary to atypical or viral pneumonitis.  3. Marked decreased lung volume. There is bibasilar atelectasis with  consolidation and air bronchogram formation. There are bilateral   parapneumonic  effusions.  4. Multiple age indeterminate compression fractures involving the thoracic  spine.  5. Intrathoracic stomach.    TTE / FELICE:  < from: TTE Echo Complete w/o Contrast w/ Doppler (02.21.23 @ 00:15) >  CONCLUSIONS:     1. Limited study obtained for evaluation of right ventricular function   performed by cardiology fellow on call.   2. On limited views, left ventricular function appears mildly to   moderately reduced.   3. Dilated right ventricular size. Reduced right ventricular systolic   function.   4. Dilated right atrium.   5. Mild aortic regurgitation.   6. Moderate tricuspid regurgitation.   7. Pulmonary hypertension present, pulmonary artery systolic pressure is   64 mmHg.   8. Small pericardial effusion noted around the right heart.   9. No prior echo is available for comparison.  10. Recommend repeat study for full cardiac assessment.    < end of copied text >   Surgeon: Dr. Dickens    Requesting Physician: Dr. Costello    HISTORY OF PRESENT ILLNESS:  78 YO Female w/ PMHx of hiatal hernia, esophageal ulcer (diagnosed at [facility] in 1/2023), HLD, depression, and seizure disorder (last seizure 2 yrs ago) who originally presented to [facility] on 2/16 c/o dark emesis x 6 days, admitted for work-up of GIB. Pt underwent EGD on 2/17 w/ GI which revealed large paraesophageal hiatal hernia and nonspecific erosive esophagitis, which was likely source of coffee ground emesis. Presently being treated w/ PPI and carafate. Pt had increased O2 requirements and started on IV Zosyn for suspected ASA PNA. Underwent CTA Chest 2/20 which revealed numerous bilateral pulmonary emboli, saddle embolus on the right with extension into multiple upper and lower lobe pulmonary arteries. Bedside TTE by cardio fellow 2/21: nml LV function, preserved RV systolic function, dilated RV w/ severe TR, trace AI, RV pressure overload. Structural heart team consulted for PERT. Formal TTE and U/S LE dopplers pending.     PAST MEDICAL & SURGICAL HISTORY:  Hiatal hernia    High cholesterol    History of esophageal ulcer    HTN (hypertension)    Major depression    Seizure disorder    Anxiety    No significant past surgical history    MEDICATIONS  (STANDING):  ARIPiprazole 2 milliGRAM(s) Oral every 24 hours  guaiFENesin Oral Liquid (Sugar-Free) 100 milliGRAM(s) Oral every 6 hours  heparin  Infusion 1700 Unit(s)/Hr (17 mL/Hr) IV Continuous <Continuous>  lamoTRIgine 100 milliGRAM(s) Oral every 24 hours  pantoprazole    Tablet 40 milliGRAM(s) Oral every 12 hours  PARoxetine 30 milliGRAM(s) Oral every 24 hours  piperacillin/tazobactam IVPB.. 4.5 Gram(s) IV Intermittent every 8 hours  sucralfate suspension 1 Gram(s) Oral every 6 hours    MEDICATIONS  (PRN):  acetaminophen     Tablet .. 650 milliGRAM(s) Oral every 6 hours PRN Temp greater or equal to 38C (100.4F)  ondansetron Injectable 4 milliGRAM(s) IV Push every 8 hours PRN Nausea and/or Vomiting    Allergies    lanolin topical (Rash)    Intolerances    SOCIAL HISTORY:  Lives with a roommate, , never smoker, no illicit drug use, occasional glass of wine, Carries out all ADLs independently. Uses rollator at baseline    FAMILY HISTORY:  No pertinent family history in first degree relatives    Review of Systems:  CONSTITUTIONAL: Denies fevers / chills, sweats, fatigue, weight loss, weight gain                                       NEURO:  Denies parathesias, seizures, syncope, confusion                                                                                  EYES:  Denies blurry vision, discharge, pain, loss of vision                                                                                    ENMT:  Denies difficulty hearing, vertigo, dysphagia, epistaxis, recent dental work                                       CV:  Denies chest pain, palpitations, JEAN BAPTISTE, orthopnea                                                                                           RESPIRATORY:  Denies wWheezing, SOB, cough / sputum, hemoptysis                                                               GI:  Denies nausea, vomiting, diarrhea, constipation, melena                                                                          : Denies hematuria, dysuria, urgency, incontinence                                                                                          MUSKULOSKELETAL:  Denies arthritis, joint swelling, muscle weakness                                                             SKIN/BREAST:  Denies rash, itching, hair loss, masses                                                                                              PSYCH:  Denies depression, anxiety, suicidal ideation                                                                                                HEME/LYMPH:  Denies bruises easily, enlarged lymph nodes, tender lymph nodes                                          ENDOCRINE:  Denies cold intolerance, heat intolerance, polydipsia                                                                      Vital Signs Last 24 Hrs  T(C): 37 (21 Feb 2023 10:00), Max: 38.2 (21 Feb 2023 01:00)  T(F): 98.6 (21 Feb 2023 10:00), Max: 100.7 (21 Feb 2023 01:00)  HR: 72 (21 Feb 2023 08:20) (58 - 82)  BP: 122/57 (21 Feb 2023 08:10) (92/50 - 122/57)  BP(mean): 82 (21 Feb 2023 08:10) (67 - 82)  RR: 20 (21 Feb 2023 08:20) (18 - 22)  SpO2: 91% (21 Feb 2023 08:20) (80% - 100%)    Parameters below as of 21 Feb 2023 08:20  Patient On (Oxygen Delivery Method): nasal cannula, high flow  O2 Flow (L/min): 60  O2 Concentration (%): 100    PHYSICAL EXAM:  GENERAL: NAD, lying in bed comfortably  HEAD:  Atraumatic, Normocephalic  EYES: EOMI, PERRLA, conjunctiva and sclera clear  ENT: Moist mucous membranes  NECK: Supple, No JVD  CHEST/LUNG: Clear to auscultation bilaterally; No rales, rhonchi, wheezing, or rubs. Unlabored respirations  HEART: Regular rate and rhythm; No murmurs, rubs, or gallops  ABDOMEN: Bowel sounds present; Soft, Nontender, Nondistended. No hepatomegally  EXTREMITIES:  2+ Peripheral Pulses, brisk capillary refill. No clubbing, cyanosis, or edema  NERVOUS SYSTEM:  Alert & Oriented X3, speech clear. No deficits   MSK: FROM all 4 extremities, full and equal strength  SKIN: No rashes or lesions    LABS:                        7.5    9.19  )-----------( 241      ( 21 Feb 2023 05:53 )             24.4     02-21    137  |  96  |  8   ----------------------------<  119<H>  3.6   |  33<H>  |  0.65    Ca    8.8      21 Feb 2023 05:53  Phos  3.3     02-21  Mg     1.7     02-21    TPro  4.9<L>  /  Alb  2.3<L>  /  TBili  0.2  /  DBili  x   /  AST  47<H>  /  ALT  75<H>  /  AlkPhos  154<H>  02-21    PT/INR - ( 21 Feb 2023 05:53 )   PT: 15.0 sec;   INR: 1.26       PTT - ( 21 Feb 2023 05:53 )  PTT:54.2 sec  Urinalysis Basic - ( 20 Feb 2023 09:22 )    Color: Yellow / Appearance: Clear / SG: >=1.030 / pH: x  Gluc: x / Ketone: Trace mg/dL  / Bili: Negative / Urobili: 0.2 E.U./dL   Blood: x / Protein: 30 mg/dL / Nitrite: NEGATIVE   Leuk Esterase: NEGATIVE / RBC: < 5 /HPF / WBC < 5 /HPF   Sq Epi: x / Non Sq Epi: 0-5 /HPF / Bacteria: Present /HPF    CARDIAC MARKERS ( 20 Feb 2023 05:30 )  x     / <0.01 ng/mL / x     / x     / x        RADIOLOGY & ADDITIONAL STUDIES:  CXR: < from: Xray Chest 1 View- PORTABLE-Urgent (Xray Chest 1 View- PORTABLE-Urgent .) (02.19.23 @ 06:56) >  FINDINGS/  IMPRESSION: No substantial interval change compared to prior chest   radiograph.    CT Scan:  < from: CT Angio Chest PE Protocol w/ IV Cont (02.20.23 @ 22:06) >  FINDINGS:  Pulmonary arteries: Pulmonary arterial enlargement. Numerous bilateral  pulmonary emboli. There are pulmonary emboli within arteriesof the   lingula.  There is a saddle embolus within the right main pulmonary artery,   extending  into multiple right lower lobe pulmonary arterial branches. There are   multiple  pulmonary emboli involving the right upper lobe pulmonary arterial   branches.  Aorta: There is calcification of the aorta and the aortic branches.    Lungs: There is a ground-glass pulmonary infiltrate with interlobular   septal  thickening involving the right upper lobe. There is marked volume loss   with  atelectasisand or consolidation involving the right lower lobe. This is  associated with air bronchogram formation. There are scattered areas of  ground-glass pulmonary opacity and interstitial thickening involving the   left  upper lobe. There is volume loss with near complete atelectasis and  consolidation involving the left lower lobe. There is air bronchogram  formation.  Pleural spaces: Bilateral pleural effusions, left-greater-than-right.  Heart: Cardiomegaly.  Heart RV/LV ratio: The RV-LV ratio is normal, 0.96.  Coronary arteries: There is coronary artery calcification.  Lymph nodes: Unremarkable. No enlarged lymph nodes.    Stomach and bowel: Intrathoracic stomach.  Bones/joints: Degenerative and hypertrophic changes of the visualized   osseous  structures. There is exaggeration of the normal thoracic kyphosis. There   are  multiple age indeterminate compression fractures. There is compression  deformity involving the T4, T8, and T12 vertebra.There is   demineralization of  the visualized osseous structures.  Soft tissues: Unremarkable.    IMPRESSION:  1. Numerous bilateral pulmonary emboli. There is a saddle embolus on the   right  with extension into multiple upper and lower lobe pulmonary arteries.  2. Ground-glass pulmonary infiltrate within the right upper lobe. This   may be  secondary to atypical or viral pneumonitis.  3. Marked decreased lung volume. There is bibasilar atelectasis with  consolidation and air bronchogram formation. There are bilateral   parapneumonic  effusions.  4. Multiple age indeterminate compression fractures involving the thoracic  spine.  5. Intrathoracic stomach.    TTE / FELICE:  < from: TTE Echo Complete w/o Contrast w/ Doppler (02.21.23 @ 00:15) >  CONCLUSIONS:     1. Limited study obtained for evaluation of right ventricular function   performed by cardiology fellow on call.   2. On limited views, left ventricular function appears mildly to   moderately reduced.   3. Dilated right ventricular size. Reduced right ventricular systolic   function.   4. Dilated right atrium.   5. Mild aortic regurgitation.   6. Moderate tricuspid regurgitation.   7. Pulmonary hypertension present, pulmonary artery systolic pressure is   64 mmHg.   8. Small pericardial effusion noted around the right heart.   9. No prior echo is available for comparison.  10. Recommend repeat study for full cardiac assessment.    < end of copied text >

## 2023-02-21 NOTE — DIETITIAN INITIAL EVALUATION ADULT - OTHER INFO
78 y/o F w/ hx of hiatal hernia and esophageal ulcer, presenting with 6 days of dark black emesis, hoarseness, and anorexia, admitted for UGIB and aspiration PNA.     Pt assessed at bedside. Rx and labs reviewed. Pt presents with no overt signs of nutritional wasting. Pt reports a UBW of 185 lbs; CBW of 183 lbs supports reported stable wt trend. No changes in appetite or PO intake PTA; per chart review emesis and decreased appetite x6 days. Pt with hx esophageal cancer; no reported difficult chew/swallow. Presents for emesis after glass of wine; denies blood in stool. Underwent EGD 2/17 sig for hiatal hernia, esophagitis, and gastroparesis. S/S eval 2/18 recommend soft/bite-sized diet with thin liquids. NPO x1 day; monitor for return to oral diet and encourage good PO intake. Hospital course c/b PNA following EGD. No c/o NVCD or difficult chew/swallow with therapeutic textures/consistencies. NKA to food. RDN will continue to reassess, intervene, and monitor as appropriate.     Pain: 0 per chart review   GI: Abdomen ND/NT, +BS x4, LBM 2/21  Skin: WDI, no edema assessed 78 y/o F w/ hx of hiatal hernia and esophageal ulcer, presenting with 6 days of dark black emesis, hoarseness, and anorexia, admitted for UGIB and aspiration PNA.     Pt assessed at bedside. Rx and labs reviewed. Pt presents with no overt signs of nutritional wasting. Pt reports a UBW of 185 lbs; CBW of 183 lbs supports reported stable wt trend. No changes in appetite or PO intake PTA; per chart review emesis and decreased appetite x6 days. Pt with hx esophageal ulcer; no reported difficult chew/swallow. Presents for emesis after glass of wine; denies blood in stool. Underwent EGD 2/17 sig for hiatal hernia, esophagitis, and gastroparesis. S/S eval 2/18 recommend soft/bite-sized diet with thin liquids. NPO x1 day; monitor for return to oral diet and encourage good PO intake. Hospital course c/b PNA following EGD. No c/o NVCD or difficult chew/swallow with therapeutic textures/consistencies. NKA to food. RDN will continue to reassess, intervene, and monitor as appropriate.     Pain: 0 per chart review   GI: Abdomen ND/NT, +BS x4, LBM 2/21  Skin: WDI, no edema assessed

## 2023-02-21 NOTE — PROGRESS NOTE ADULT - PROBLEM SELECTOR PLAN 1
#GIB  #Coffee-ground emesis  Pt w/ known hx of esophageal ulcer, diagnosed at [facility] in 1/2023, discharged on carafate and protonix with surgery f/u for hiatal hernia repair, now p/w 6 days of dark black emesis after drinking a glass of wine. Pt reports daily episodes of emesis, and has since developed a cough and hoarseness of her voice. Found to have mild leukocytosis, neutrophilic predominance, and lactate of 2.5 w/ left lobe infiltrate on CXR. Denies any bright red blood in her vomit, no abdominal pain, diarrhea, hematochezia, or melena. Denies any dizziness or lightheadedness.  Endoscopy findings:   A large size paraesophageal hiatal hernia was seen, the esophagogastric junction(EGJ) was noted at 40 cm, with hiatal narrowing at 34 cm from the incisors. Additional findings include ulceration. Retroflexion view in the stomach confirmed the size and morphology of the hernia.  -Grade D esophagitis with no bleeding was seen starting at 30 cm from the incisors in the lower third of the esophagus and middle third of the esophagus, compatible with nonspecific erosive esophagitis. This is likely the source of coffee ground emesis  -Localized irregularity of the mucosa was noted in the Z-line and gastroesophageal junction.  -Liquefied food material was found in the stomach. Findings were suggestive of gastroparesis/delayed gastric emptying.  -Patchy erythema of the mucosa with no bleeding was noted in the stomach body. These findings are compatible with non-erosive gastritis. Cold forceps biopsies were performed for H. pylori in the stomach body and stomach antrum.  -Normal appearing duodenum  Recs:    Pantoprazole 40 mg PO BID x 2 weeks then daily  - Carafate Suspension 1g po qid  x 2 weeks Patient with intermittent fevers and increasing O2 requirement with max settings on HFNC but good saturation   CT PE with Numerous bilateral pulmonary emboli. There is a saddle embolus on the right with extension into multiple upper and lower lobe pulmonary arteries.  US Deep venous thrombosis in bilateral calf veins. Acute deep venous thrombosis: below the knee.  Trops wnl   TTE:  1. Limited study obtained for evaluation of right ventricular function performed by cardiology fellow on call.  2. On limited views, left ventricular function appears mildly to moderately reduced.  3. Dilated right ventricular size. Reduced right ventricular systolic function.  4. Dilated right atrium.  5. Mild aortic regurgitation.  6. Moderate tricuspid regurgitation.  7. Pulmonary hypertension present, pulmonary artery systolic pressure is 64 mmHg.  8. Small pericardial effusion noted around the right heart.  - PERT team consulted. F/u Dr. Montejo's recs   - formal TTE   - c/w heparin gtt for now   - will screen for HI PEITHO   - Structural heart consulted: Hep gtt started per primary team, will need to discuss risk of GIB with long-term AC with GI team. May need IVC filter if not able to tolerate AC  -Hold off on Inari trial for now given multiple active medical issues

## 2023-02-21 NOTE — CONSULT NOTE ADULT - SUBJECTIVE AND OBJECTIVE BOX
PERT SERVICE INITIAL CONSULT NOTE    Patient is a 77y old  Female who presents with a chief complaint of Aspiration PNA, hematemesis w/ known esophageal ulcer (21 Feb 2023 06:08)    Time Presented: 2/16/23  Patient location at presentation: [ ] ED, [ ] Med/Surg regional, [ x ] Med/Surg tele, [ ] ICU, [ ] Outside hospital transfer   Time of CT angiogram: 2/20 10 PM   Time PERT notified: 2/21 1 AM     HPI:  78 y/o F PMH of hiatal hernia, esophageal ulcer (diagnosed at [facility] in 1/2023), HLD, depression (on aripiprazole and paroxetine), and seizure disorder (last seizure 2 years ago, on lamotrigine), p/w 6 days of dark black emesis after drinking a glass of wine last Friday. Pt was recently evaluated at [facility] for similar symptoms, found to have an esophageal ulcer on EGD, and was discharged on carafate and protonix.  She has since had daily episodes of dark black emesis, dry cough, and hoarseness of her voice. She also c/o decreased appetite and general malaise. She denies any abdominal pain, shortness of breath, diarrhea, chest pain, palpitations, headaches, dizziness, dysuria, constipation, melena, or hematochezia.     In the ED:  Initial vital signs: T: 96.9 F, HR: 84, BP: 87/62--> 110/56 s/p, R: 18, SpO2: 93% on RA--> 100% on 3L  Labs: WBC 11.43, Hgb 9.0, MCV 94.7 Neutrophils 10.33, lactate 2.5-->0.9, K 3.3, BUN 44, Cr 1.77, pH 7.28  CXR: Left lung consolidation  Medications: unasyn x1, protonix IV x1, 2.5 L NS     (16 Feb 2023 17:07)    Interval history: s/p EGD with GI with findings consistent with delayed gastric emptyinga nd non erosive gastritis. Subsequently transferred to [facility] post EGD given post op respiratory failure suspected to be 2/2 aspiration. Perisistent hypoxic respiratory failure on telemetry, with bedside echo revealing systolic flattening of septum suggestive of pressure overload. Underwent CTPE with findings of bilateral PE, for which PERT team was called.     REVIEW OF SYSTEMS:  Constitutional: No fever, weight loss or fatigue  Eyes: No eye pain, visual disturbances, or discharge  ENMT:  No difficulty hearing, tinnitus, vertigo; No sinus or throat pain  Neck: No pain, stiffness or neck swelling  Respiratory: see HPI  Cardiovascular: No chest pain, palpitations, dizziness or leg swelling  Gastrointestinal: No abdominal or epigastric pain. No nausea, vomiting or hematemesis; No diarrhea or constipation. No melena or hematochezia.  Genitourinary: No dysuria, frequency, hematuria or incontinence  Neurological: No headaches, memory loss, loss of strength, numbness or tremors  Skin: No itching, burning, rashes or lesions   Lymph Nodes: No enlarged glands  Endocrine: No heat or cold intolerance; No hair loss  Musculoskeletal: No joint pain or swelling; No muscle, back or extremity pain  Psychiatric: No depression, anxiety, mood swings or difficulty sleeping  Heme/Lymph: No easy bruising or bleeding gums  Allergy and Immunologic: No hives or eczema    PAST MEDICAL & SURGICAL HISTORY:  Hiatal hernia      High cholesterol      History of esophageal ulcer      HTN (hypertension)      Major depression      Seizure disorder      Anxiety      No significant past surgical history          FAMILY HISTORY:  No pertinent family history in first degree relatives        MEDICATIONS  (STANDING):  ARIPiprazole 2 milliGRAM(s) Oral every 24 hours  guaiFENesin Oral Liquid (Sugar-Free) 100 milliGRAM(s) Oral every 6 hours  heparin  Infusion 1700 Unit(s)/Hr (17 mL/Hr) IV Continuous <Continuous>  lamoTRIgine 100 milliGRAM(s) Oral every 24 hours  magnesium sulfate  IVPB 1 Gram(s) IV Intermittent once  pantoprazole    Tablet 40 milliGRAM(s) Oral every 12 hours  PARoxetine 30 milliGRAM(s) Oral every 24 hours  piperacillin/tazobactam IVPB.. 4.5 Gram(s) IV Intermittent every 8 hours  potassium chloride    Tablet ER 40 milliEquivalent(s) Oral once  sucralfate suspension 1 Gram(s) Oral every 6 hours    MEDICATIONS  (PRN):  acetaminophen     Tablet .. 650 milliGRAM(s) Oral every 6 hours PRN Temp greater or equal to 38C (100.4F)  ondansetron Injectable 4 milliGRAM(s) IV Push every 8 hours PRN Nausea and/or Vomiting      Allergies    lanolin topical (Rash)    Intolerances        MEDICATIONS  (STANDING):  ARIPiprazole 2 milliGRAM(s) Oral every 24 hours  guaiFENesin Oral Liquid (Sugar-Free) 100 milliGRAM(s) Oral every 6 hours  heparin  Infusion 1700 Unit(s)/Hr (17 mL/Hr) IV Continuous <Continuous>  lamoTRIgine 100 milliGRAM(s) Oral every 24 hours  magnesium sulfate  IVPB 1 Gram(s) IV Intermittent once  pantoprazole    Tablet 40 milliGRAM(s) Oral every 12 hours  PARoxetine 30 milliGRAM(s) Oral every 24 hours  piperacillin/tazobactam IVPB.. 4.5 Gram(s) IV Intermittent every 8 hours  potassium chloride    Tablet ER 40 milliEquivalent(s) Oral once  sucralfate suspension 1 Gram(s) Oral every 6 hours      MEDICATIONS  (STANDING):  ARIPiprazole 2 milliGRAM(s) Oral every 24 hours  guaiFENesin Oral Liquid (Sugar-Free) 100 milliGRAM(s) Oral every 6 hours  heparin  Infusion 1700 Unit(s)/Hr (17 mL/Hr) IV Continuous <Continuous>  lamoTRIgine 100 milliGRAM(s) Oral every 24 hours  magnesium sulfate  IVPB 1 Gram(s) IV Intermittent once  pantoprazole    Tablet 40 milliGRAM(s) Oral every 12 hours  PARoxetine 30 milliGRAM(s) Oral every 24 hours  piperacillin/tazobactam IVPB.. 4.5 Gram(s) IV Intermittent every 8 hours  potassium chloride    Tablet ER 40 milliEquivalent(s) Oral once  sucralfate suspension 1 Gram(s) Oral every 6 hours    MEDICATIONS  (PRN):  acetaminophen     Tablet .. 650 milliGRAM(s) Oral every 6 hours PRN Temp greater or equal to 38C (100.4F)  ondansetron Injectable 4 milliGRAM(s) IV Push every 8 hours PRN Nausea and/or Vomiting      ICU Vital Signs Last 24 Hrs  T(C): 37.3 (21 Feb 2023 06:59), Max: 38.2 (21 Feb 2023 01:00)  T(F): 99.1 (21 Feb 2023 06:59), Max: 100.7 (21 Feb 2023 01:00)  HR: 58 (21 Feb 2023 04:53) (58 - 82)  BP: 96/54 (21 Feb 2023 03:39) (91/51 - 106/57)  BP(mean): 72 (21 Feb 2023 03:39) (67 - 76)  ABP: --  ABP(mean): --  RR: 20 (21 Feb 2023 04:53) (18 - 22)  SpO2: 99% (21 Feb 2023 04:53) (80% - 100%)    O2 Parameters below as of 21 Feb 2023 04:53  Patient On (Oxygen Delivery Method): nasal cannula, high flow  O2 Flow (L/min): 60  O2 Concentration (%): 100        02-20 @ 07:01  -  02-21 @ 07:00  --------------------------------------------------------  IN: 0 mL / OUT: 1800 mL / NET: -1800 mL          PHYSICAL EXAM:  Constitutional: WD  Head: NC/AT  EENT: PERRL, anicteric sclera; oropharynx clear, MMM  Neck: supple, no appreciable JVD  Respiratory: CTA B/L; no W/R/R  Cardiovascular: +S1/S2, RRR  Gastrointestinal: soft, NT/ND  Extremities: WWP; no edema, clubbing or cyanosis  Vascular: 2+ radial pulses B/L  Neurological: awake and alert; YEE  sPESI score:   Age >80 [ ], History of Cancer [ ], Hx of chronic cardiopulmonary disease [ ], Heart rate > 110 [ ], Systolic BP <100 [ x ], SpO2 <90% [ x ]  mMRC score: 4  Marta scale: 7    LABS:    CBC Full  -  ( 21 Feb 2023 05:53 )  WBC Count : 9.19 K/uL  RBC Count : 2.54 M/uL  Hemoglobin : 7.5 g/dL  Hematocrit : 24.4 %  Platelet Count - Automated : 241 K/uL  Mean Cell Volume : 96.1 fl  Mean Cell Hemoglobin : 29.5 pg  Mean Cell Hemoglobin Concentration : 30.7 gm/dL  Auto Neutrophil # : x  Auto Lymphocyte # : x  Auto Monocyte # : x  Auto Eosinophil # : x  Auto Basophil # : x  Auto Neutrophil % : x  Auto Lymphocyte % : x  Auto Monocyte % : x  Auto Eosinophil % : x  Auto Basophil % : x    02-21    137  |  96  |  8   ----------------------------<  119<H>  3.6   |  33<H>  |  0.65    Ca    8.8      21 Feb 2023 05:53  Phos  3.3     02-21  Mg     1.7     02-21    TPro  4.9<L>  /  Alb  2.3<L>  /  TBili  0.2  /  DBili  x   /  AST  47<H>  /  ALT  75<H>  /  AlkPhos  154<H>  02-21    PT/INR - ( 21 Feb 2023 05:53 )   PT: 15.0 sec;   INR: 1.26          PTT - ( 21 Feb 2023 05:53 )  PTT:54.2 sec    Troponin T, Serum: <0.01 ng/mL (02-20-23 @ 05:30)    Serum Pro-Brain Natriuretic Peptide: 1141 pg/mL (02-20-23 @ 05:30)              RADIOLOGY & ADDITIONAL STUDIES:  CT Angio Chest PE Protocol w/ IV Cont:   ******PRELIMINARY REPORT******      ******PRELIMINARY REPORT******         ACC: [id number] [facility]   ORDERED BY: ALEYDA GOODMAN     PROCEDURE DATE:  02/20/2023    ******PRELIMINARY REPORT******      ******PRELIMINARY REPORT******           INTERPRETATION:  [facility] RADIOLOGIST PRELIMINARY REPORT      Initial report created on 2/21/2023 12:10:57 AM EST  PROCEDURE INFORMATION:  Exam: CTA Chest With Contrast  Exam date and time: 2/20/2023 9:57 PM  Age: 77 years old  Clinical indication: Other: R/O pe    TECHNIQUE:  Imaging protocol: Computed tomographic angiography of the chest with   contrast.  3D rendering (Not supervised by radiologist): MIP and/or 3D reconstructed  images were created by the technologist.    COMPARISON:  CR XR CHEST URGENT 2/19/2023 6:17 AM    FINDINGS:  Pulmonary arteries: Pulmonary arterial enlargement. Numerous bilateral  pulmonary emboli. There are pulmonary emboli within arteries of the   lingula.  There is a saddle embolus within the right main pulmonary artery,   extending  into multiple right lower lobe pulmonary arterial branches. There are   multiple  pulmonary emboli involving the right upper lobe pulmonary arterial   branches.  Aorta: There is calcification of the aorta and the aortic branches.    Lungs: There is a ground-glass pulmonary infiltrate with interlobular   septal  thickening involving the right upper lobe. There is marked volume loss   with  atelectasis and or consolidation involving the right lower lobe. This is  associated with air bronchogram formation. There are scattered areas of  ground-glass pulmonary opacity and interstitial thickening involving the   left  upper lobe. There is volume loss with near complete atelectasis and  consolidation involving the left lower lobe. There is air bronchogram  formation.  Pleural spaces: Bilateral pleural effusions, left-greater-than-right.  Heart: Cardiomegaly.  Heart RV/LV ratio: The RV-LV ratio is normal, 0.96.  Coronary arteries: There is coronary artery calcification.  Lymph nodes: Unremarkable. No enlarged lymph nodes.    Stomach and bowel: Intrathoracic stomach.  Bones/joints: Degenerative and hypertrophic changes of the visualized   osseous  structures. There is exaggeration of the normal thoracic kyphosis. There   are  multiple age indeterminate compression fractures. There is compression  deformity involving the T4, T8, and T12 vertebra.There is   demineralization of  the visualized osseous structures.  Soft tissues: Unremarkable.    IMPRESSION:  1. Numerous bilateral pulmonary emboli. There is a saddle embolus on the   right  with extension into multiple upper and lower lobe pulmonary arteries.  2. Ground-glass pulmonary infiltrate within the right upper lobe. This   may be  secondary to atypical or viral pneumonitis.  3. Marked decreased lung volume. There is bibasilar atelectasis with  consolidation and air bronchogram formation. There are bilateral   parapneumonic  effusions.  4. Multiple age indeterminate compression fractures involving the thoracic  spine.  5. Intrathoracic stomach.        ******PRELIMINARY REPORT******      ******PRELIMINARY REPORT******         DORIS GRIMM M.D.;[facility] RADIOLOGIST  This document is a PRELIMINARY interpretation and is pending final   attending approval. Feb 21 2023 12:11AM (02-20-23 @ 22:06)            REITE Bleeding Risk:   [ x ] Recent Major Bleeding (2pt)  [ ] Creatinine > 1.2 mg/dL ( 1.5pt)  [ x ] Anemia (<13 g/dL M, < 12 g/dL F) (1.5 pt)  [ ] Malignancy History (1pt)  [ ] Clinically - overt PE (1pt)  [ x ] Age > 75 (1pt)  Total:   BACS Bleeding Risk:   [ x ] Recent Major Bleeding (3pt)  [ x ] Age > 75 (1pt)  [ ] Active Cancer (1pt)  [ ] Syncope (1pt)

## 2023-02-21 NOTE — CONSULT NOTE ADULT - ASSESSMENT
Assesment:  76 YO Female w/ PMHx of hiatal hernia, esophageal ulcer (diagnosed at [facility] in 1/2023), HLD, depression, and seizure disorder (last seizure 2 yrs ago) who originally presented to [facility] on 2/16 c/o dark emesis x 6 days, admitted for work-up of GIB. Pt underwent EGD on 2/17 w/ GI which revealed large paraesophageal hiatal hernia and nonspecific erosive esophagitis, which was likely source of coffee ground emesis. Presently being treated w/ PPI and carafate. Pt had increased O2 requirements and started on IV Zosyn for suspected ASA PNA. Underwent CTA Chest 2/20 which revealed numerous bilateral pulmonary emboli, saddle embolus on the right with extension into multiple upper and lower lobe pulmonary arteries. Bedside TTE by cardio fellow 2/21: nml LV function, preserved RV systolic function, dilated RV w/ severe TR, trace AI, RV pressure overload. Structural heart team consulted for PERT. Formal TTE and U/S LE dopplers pending.     Plan:  Problem 1: PE  -Discussed with Dr. Hernandez  -CTA Chest 2/20: numerous bilateral pulmonary emboli, saddle embolus on the right with extension into multiple upper and lower lobe pulmonary arteries  -Follow-up formal TTE and bilateral LE dopplers  -Hep gtt started per primary team, but will need to discuss risk of GIB with GI team. May need IVC filter if not able to tolerate AC  -Hold off on Inari trial for now given multiple active medical issues  -Structural heart team will continue to follow    Problem 2: GIB 2/2 erosive esophagitis  -S/p EGD 2/17:  large paraesophageal hiatal hernia and nonspecific erosive esophagitis  -GI team following  -Trend H&H and transfuse as indicated    Problem 3: HLD  -Home Lipitor as indicated (likely held 2/2 elevated LFTs)  -Care per primary team     Problem 4: Depression  -Cont home Abilify and Paxil   -Care per primary team    I have reviewed clinical labs tests and reports, radiology tests and reports, as well as old patient medical records, and discussed with the refering physician.     Assesment:  78 YO Female w/ PMHx of hiatal hernia, esophageal ulcer (diagnosed at [facility] in 1/2023), HLD, depression, and seizure disorder (last seizure 2 yrs ago) who originally presented to [facility] on 2/16 c/o dark emesis x 6 days, admitted for work-up of GIB. Pt underwent EGD on 2/17 w/ GI which revealed large paraesophageal hiatal hernia and nonspecific erosive esophagitis, which was likely source of coffee ground emesis. Presently being treated w/ PPI and carafate. Pt had increased O2 requirements and started on IV Zosyn for suspected ASA PNA. Underwent CTA Chest 2/20 which revealed numerous bilateral pulmonary emboli, saddle embolus on the right with extension into multiple upper and lower lobe pulmonary arteries. Bedside TTE by cardio fellow 2/21: nml LV function, preserved RV systolic function, dilated RV w/ severe TR, trace AI, RV pressure overload. Structural heart team consulted for PERT. Formal TTE and U/S LE dopplers pending.     Plan:  Problem 1: PE  -Discussed with Dr. Hernandez  -CTA Chest 2/20: numerous bilateral pulmonary emboli, saddle embolus on the right with extension into multiple upper and lower lobe pulmonary arteries  -Follow-up formal TTE and bilateral LE dopplers  -Hep gtt started per primary team, will need to discuss risk of GIB with long-term AC with GI team. May need IVC filter if not able to tolerate AC  -Hold off on Inari trial for now given multiple active medical issues  -Structural heart team will continue to follow    Problem 2: GIB 2/2 erosive esophagitis  -S/p EGD 2/17:  large paraesophageal hiatal hernia and nonspecific erosive esophagitis  -GI team following  -Trend H&H and transfuse as indicated    Problem 3: HLD  -Home Lipitor as indicated (likely held 2/2 elevated LFTs)  -Care per primary team     Problem 4: Depression  -Cont home Abilify and Paxil   -Care per primary team    I have reviewed clinical labs tests and reports, radiology tests and reports, as well as old patient medical records, and discussed with the refering physician.

## 2023-02-21 NOTE — DIETITIAN INITIAL EVALUATION ADULT - PERTINENT MEDS FT
MEDICATIONS  (STANDING):  ARIPiprazole 2 milliGRAM(s) Oral every 24 hours  guaiFENesin Oral Liquid (Sugar-Free) 100 milliGRAM(s) Oral every 6 hours  heparin  Infusion 1700 Unit(s)/Hr (17 mL/Hr) IV Continuous <Continuous>  lamoTRIgine 100 milliGRAM(s) Oral every 24 hours  pantoprazole    Tablet 40 milliGRAM(s) Oral every 12 hours  PARoxetine 30 milliGRAM(s) Oral every 24 hours  piperacillin/tazobactam IVPB.. 4.5 Gram(s) IV Intermittent every 8 hours  sucralfate suspension 1 Gram(s) Oral every 6 hours    MEDICATIONS  (PRN):  acetaminophen     Tablet .. 650 milliGRAM(s) Oral every 6 hours PRN Temp greater or equal to 38C (100.4F)  ondansetron Injectable 4 milliGRAM(s) IV Push every 8 hours PRN Nausea and/or Vomiting

## 2023-02-21 NOTE — PROGRESS NOTE ADULT - PROBLEM SELECTOR PLAN 7
F: none  E: replete K<4, Mg<2  N: Regular   VTE Prophylaxis: hold i/s/o active UGIB  GI: protonix ggt  C: DNR/DNI (new molst in chart)  D: RMF On home atorvastatin 20 mg  - c/w home meds

## 2023-02-21 NOTE — DIETITIAN INITIAL EVALUATION ADULT - OTHER CALCULATIONS
IBW used to calculate needs due to pt's current body weight exceeding 120% of IBW adjusted for maintenance with increased protein for consideration of decreased intake x6 days reported.

## 2023-02-22 LAB
ALBUMIN SERPL ELPH-MCNC: 2.5 G/DL — LOW (ref 3.3–5)
ALP SERPL-CCNC: 187 U/L — HIGH (ref 40–120)
ALT FLD-CCNC: 63 U/L — HIGH (ref 10–45)
ANION GAP SERPL CALC-SCNC: 6 MMOL/L — SIGNIFICANT CHANGE UP (ref 5–17)
APTT BLD: 125.3 SEC — CRITICAL HIGH (ref 27.5–35.5)
APTT BLD: 26.4 SEC — LOW (ref 27.5–35.5)
APTT BLD: 83.4 SEC — HIGH (ref 27.5–35.5)
APTT BLD: 83.4 SEC — HIGH (ref 27.5–35.5)
AST SERPL-CCNC: 32 U/L — SIGNIFICANT CHANGE UP (ref 10–40)
BILIRUB SERPL-MCNC: 0.2 MG/DL — SIGNIFICANT CHANGE UP (ref 0.2–1.2)
BUN SERPL-MCNC: 6 MG/DL — LOW (ref 7–23)
CALCIUM SERPL-MCNC: 9.2 MG/DL — SIGNIFICANT CHANGE UP (ref 8.4–10.5)
CHLORIDE SERPL-SCNC: 95 MMOL/L — LOW (ref 96–108)
CO2 SERPL-SCNC: 35 MMOL/L — HIGH (ref 22–31)
CREAT SERPL-MCNC: 0.74 MG/DL — SIGNIFICANT CHANGE UP (ref 0.5–1.3)
CULTURE RESULTS: SIGNIFICANT CHANGE UP
EGFR: 82 ML/MIN/1.73M2 — SIGNIFICANT CHANGE UP
GLUCOSE SERPL-MCNC: 119 MG/DL — HIGH (ref 70–99)
HCT VFR BLD CALC: 26.2 % — LOW (ref 34.5–45)
HGB BLD-MCNC: 8.1 G/DL — LOW (ref 11.5–15.5)
MAGNESIUM SERPL-MCNC: 1.8 MG/DL — SIGNIFICANT CHANGE UP (ref 1.6–2.6)
MCHC RBC-ENTMCNC: 29.8 PG — SIGNIFICANT CHANGE UP (ref 27–34)
MCHC RBC-ENTMCNC: 30.9 GM/DL — LOW (ref 32–36)
MCV RBC AUTO: 96.3 FL — SIGNIFICANT CHANGE UP (ref 80–100)
NRBC # BLD: 0 /100 WBCS — SIGNIFICANT CHANGE UP (ref 0–0)
PHOSPHATE SERPL-MCNC: 2.9 MG/DL — SIGNIFICANT CHANGE UP (ref 2.5–4.5)
PLATELET # BLD AUTO: 286 K/UL — SIGNIFICANT CHANGE UP (ref 150–400)
POTASSIUM SERPL-MCNC: 4.2 MMOL/L — SIGNIFICANT CHANGE UP (ref 3.5–5.3)
POTASSIUM SERPL-SCNC: 4.2 MMOL/L — SIGNIFICANT CHANGE UP (ref 3.5–5.3)
PROT SERPL-MCNC: 5.3 G/DL — LOW (ref 6–8.3)
RBC # BLD: 2.72 M/UL — LOW (ref 3.8–5.2)
RBC # FLD: 15.1 % — HIGH (ref 10.3–14.5)
SODIUM SERPL-SCNC: 136 MMOL/L — SIGNIFICANT CHANGE UP (ref 135–145)
SPECIMEN SOURCE: SIGNIFICANT CHANGE UP
WBC # BLD: 9.25 K/UL — SIGNIFICANT CHANGE UP (ref 3.8–10.5)
WBC # FLD AUTO: 9.25 K/UL — SIGNIFICANT CHANGE UP (ref 3.8–10.5)

## 2023-02-22 PROCEDURE — 99233 SBSQ HOSP IP/OBS HIGH 50: CPT | Mod: GC

## 2023-02-22 PROCEDURE — 99232 SBSQ HOSP IP/OBS MODERATE 35: CPT

## 2023-02-22 RX ORDER — MAGNESIUM SULFATE 500 MG/ML
1 VIAL (ML) INJECTION ONCE
Refills: 0 | Status: COMPLETED | OUTPATIENT
Start: 2023-02-22 | End: 2023-02-22

## 2023-02-22 RX ORDER — HEPARIN SODIUM 5000 [USP'U]/ML
1300 INJECTION INTRAVENOUS; SUBCUTANEOUS
Qty: 25000 | Refills: 0 | Status: DISCONTINUED | OUTPATIENT
Start: 2023-02-22 | End: 2023-02-22

## 2023-02-22 RX ORDER — ENOXAPARIN SODIUM 100 MG/ML
85 INJECTION SUBCUTANEOUS EVERY 12 HOURS
Refills: 0 | Status: DISCONTINUED | OUTPATIENT
Start: 2023-02-22 | End: 2023-02-28

## 2023-02-22 RX ADMIN — Medication 30 MILLIGRAM(S): at 09:23

## 2023-02-22 RX ADMIN — HEPARIN SODIUM 13 UNIT(S)/HR: 5000 INJECTION INTRAVENOUS; SUBCUTANEOUS at 01:05

## 2023-02-22 RX ADMIN — PANTOPRAZOLE SODIUM 40 MILLIGRAM(S): 20 TABLET, DELAYED RELEASE ORAL at 18:46

## 2023-02-22 RX ADMIN — LAMOTRIGINE 100 MILLIGRAM(S): 25 TABLET, ORALLY DISINTEGRATING ORAL at 09:23

## 2023-02-22 RX ADMIN — PIPERACILLIN AND TAZOBACTAM 25 GRAM(S): 4; .5 INJECTION, POWDER, LYOPHILIZED, FOR SOLUTION INTRAVENOUS at 13:09

## 2023-02-22 RX ADMIN — Medication 1 GRAM(S): at 00:03

## 2023-02-22 RX ADMIN — Medication 100 GRAM(S): at 06:56

## 2023-02-22 RX ADMIN — Medication 100 MILLIGRAM(S): at 12:45

## 2023-02-22 RX ADMIN — HEPARIN SODIUM 13 UNIT(S)/HR: 5000 INJECTION INTRAVENOUS; SUBCUTANEOUS at 12:53

## 2023-02-22 RX ADMIN — Medication 1 GRAM(S): at 06:08

## 2023-02-22 RX ADMIN — Medication 1 GRAM(S): at 12:45

## 2023-02-22 RX ADMIN — PANTOPRAZOLE SODIUM 40 MILLIGRAM(S): 20 TABLET, DELAYED RELEASE ORAL at 05:42

## 2023-02-22 RX ADMIN — HEPARIN SODIUM 17 UNIT(S)/HR: 5000 INJECTION INTRAVENOUS; SUBCUTANEOUS at 13:09

## 2023-02-22 RX ADMIN — ENOXAPARIN SODIUM 85 MILLIGRAM(S): 100 INJECTION SUBCUTANEOUS at 21:47

## 2023-02-22 RX ADMIN — Medication 100 MILLIGRAM(S): at 00:03

## 2023-02-22 RX ADMIN — Medication 1 GRAM(S): at 18:47

## 2023-02-22 RX ADMIN — PIPERACILLIN AND TAZOBACTAM 25 GRAM(S): 4; .5 INJECTION, POWDER, LYOPHILIZED, FOR SOLUTION INTRAVENOUS at 21:48

## 2023-02-22 RX ADMIN — Medication 100 MILLIGRAM(S): at 05:41

## 2023-02-22 RX ADMIN — PIPERACILLIN AND TAZOBACTAM 25 GRAM(S): 4; .5 INJECTION, POWDER, LYOPHILIZED, FOR SOLUTION INTRAVENOUS at 05:41

## 2023-02-22 RX ADMIN — ARIPIPRAZOLE 2 MILLIGRAM(S): 15 TABLET ORAL at 12:47

## 2023-02-22 NOTE — PROGRESS NOTE ADULT - PROBLEM SELECTOR PLAN 4
Hgb 9.0 unknown baseline,  MCV 94 2/2 to active hematemesis i/s/o known esophageal ulcer  Iron 20, Ferritin 154, total binding capacity 164, %sat 12    - Trend CBC  - Maintain active T&S (order for 2/18)  - transfuse if Hgb <7 MCV 94 2/2 to active hematemesis i/s/o known esophageal ulcer. Iron 20, Ferritin 154, total binding capacity 164, %sat 12. Hgb remains stable, 8.1 today.  Plan:  - Trend CBC  - Maintain active T&S (order for 2/18)  - transfuse if Hgb <7

## 2023-02-22 NOTE — PROGRESS NOTE ADULT - ASSESSMENT
78 y/o F w/ hx of hiatal hernia and esophageal ulcer, presenting with 6 days of dark black emesis, hoarseness, and anorexia, admitted for UGIB and aspiration PNA. Patient has been on zosyn for 3 days for aspitation PNA treatment however has continued to spike fevers currently undergoing further work up

## 2023-02-22 NOTE — PROGRESS NOTE ADULT - ASSESSMENT
76 YO Female w/ PMHx of hiatal hernia, esophageal ulcer (diagnosed at [facility] in 1/2023), HLD, depression, and seizure disorder (last seizure 2 yrs ago) who originally presented to [facility] on 2/16 c/o dark emesis x 6 days, admitted for work-up of GIB. Pt underwent EGD on 2/17 w/ GI which revealed large paraesophageal hiatal hernia and nonspecific erosive esophagitis, which was likely source of coffee ground emesis. Presently being treated w/ PPI and carafate. Pt had increased O2 requirements and started on IV Zosyn for suspected ASA PNA. Underwent CTA Chest 2/20 which revealed numerous bilateral pulmonary emboli, saddle embolus on the right with extension into multiple upper and lower lobe pulmonary arteries. Bedside TTE by cardio fellow 2/21: nml LV function, preserved RV systolic function, dilated RV w/ severe TR, trace AI, RV pressure overload. Structural heart team consulted for PERT. Formal TTE reveals moderately dilated RV with mildly reduced RV systolic function and moderate to severe TR.     Plan:  Problem 1: PE  -CTA Chest 2/20: numerous bilateral pulmonary emboli, saddle embolus on the right with extension into multiple upper and lower lobe pulmonary arteries  -Follow-up formal TTE and bilateral LE dopplers  -Hep gtt started per primary team, pt seems to be tolerating well, no further GIB, Hgb stable  -TTE: moderately dilated RV with mildly reduced RV systolic function and moderate to severe TR.   -Hold off on Inari trial for now given multiple active medical issues  -Structural heart team will continue to follow    Problem 2: GIB 2/2 erosive esophagitis  -S/p EGD 2/17:  large paraesophageal hiatal hernia and nonspecific erosive esophagitis  -GI team following  -Trend H&H and transfuse as indicated    Problem 3: HLD  -Home Lipitor as indicated (likely held 2/2 elevated LFTs)  -Care per primary team     Problem 4: Depression  -Cont home Abilify and Paxil   -Care per primary team    I have reviewed clinical labs tests and reports, radiology tests and reports, as well as old patient medical records, and discussed with the refering physician.   76 YO Female w/ PMHx of hiatal hernia, esophageal ulcer (diagnosed at [facility] in 1/2023), HLD, depression, and seizure disorder (last seizure 2 yrs ago) who originally presented to [facility] on 2/16 c/o dark emesis x 6 days, admitted for work-up of GIB. Pt underwent EGD on 2/17 w/ GI which revealed large paraesophageal hiatal hernia and nonspecific erosive esophagitis, which was likely source of coffee ground emesis. Presently being treated w/ PPI and carafate. Pt had increased O2 requirements and started on IV Zosyn for suspected ASA PNA. Underwent CTA Chest 2/20 which revealed numerous bilateral pulmonary emboli, saddle embolus on the right with extension into multiple upper and lower lobe pulmonary arteries. Bedside TTE by cardio fellow 2/21: nml LV function, preserved RV systolic function, dilated RV w/ severe TR, trace AI, RV pressure overload. Structural heart team consulted for PERT. Formal TTE reveals moderately dilated RV with mildly reduced RV systolic function and moderate to severe TR.     Plan:  Problem 1: PE  -CTA Chest 2/20: numerous bilateral pulmonary emboli, saddle embolus on the right with extension into multiple upper and lower lobe pulmonary arteries  -Follow-up formal TTE and bilateral LE dopplers  -Hep gtt started per primary team, pt seems to be tolerating well, no further GIB, Hgb stable  -TTE: moderately dilated RV with mildly reduced RV systolic function and moderate to severe TR.   -no intervention indicated by structural heart team, will be signing off      Problem 2: GIB 2/2 erosive esophagitis  -S/p EGD 2/17:  large paraesophageal hiatal hernia and nonspecific erosive esophagitis  -GI team following  -Trend H&H and transfuse as indicated    Problem 3: HLD  -Home Lipitor as indicated (likely held 2/2 elevated LFTs)  -Care per primary team     Problem 4: Depression  -Cont home Abilify and Paxil   -Care per primary team    I have reviewed clinical labs tests and reports, radiology tests and reports, as well as old patient medical records, and discussed with the refering physician.

## 2023-02-22 NOTE — PROGRESS NOTE ADULT - PROBLEM SELECTOR PLAN 3
Pt w/ worsening cough and voice hoarseness i/s/o daily emesis, found to have mild leukocytosis, neutrophilic predominance, and lactate of 2.5 w/ left lobe infiltrate on CXR. Initially hypotensive on presentation to ED 80s/60s. now improved after 2.5 L NS. Likely aspiration PNA 2/2 intractable vomiting vs CAP. s/p 1 dose of unasyn in the ED, started on ceftx 2g q24 which was transitioned to Zosyn with pseudomonal coverage due to concern for aspiration pneumonia post endoscopy.  Patient spiked fever 2/19 with increase O2 requirement to 50/100 weaned to 50/90. MRSA swab was negative. POCUS shwoing RV dilation. Patient was diuresed with 20 mg lasix   On 2/20 Increased O2 requirement at night requiring increasing the O2. WBC count wnl, Temp below Tmax in past 24 hrs, no need for cultures. Continue management as below     - C/w Zosyn 4.5 q4hrs (5 day course) today is day 4  - Blood culture (2/19) NGTD and sputum culture (2/20 - collected)   - blood culture NGTD from 2/16  - Aspiration precautions  - Wean O2 as tolerated  - incentive spirometry  - Chest PT   - OOBTC Pt w/ worsening cough and voice hoarseness i/s/o daily emesis, found to have mild leukocytosis, neutrophilic predominance, and lactate of 2.5 w/ left lobe infiltrate on CXR. Initially hypotensive on presentation to ED 80s/60s. now improved after 2.5 L NS. Likely aspiration PNA 2/2 intractable vomiting vs CAP. s/p 1 dose of unasyn in the ED, started on ceftx 2g q24 which was transitioned to Zosyn with pseudomonal coverage due to concern for aspiration pneumonia post endoscopy.  Patient spiked fever 2/19 with increase O2 requirement to 50/100 weaned to 50/90. MRSA swab was negative. POCUS shwoing RV dilation. Patient was diuresed with 20 mg lasix   On 2/20 Increased O2 requirement at night requiring increasing the O2. WBC count wnl, Temp below Tmax in past 24 hrs. Currently on HFNC on 60L. Continue management as below   Plan:  - C/w Zosyn 4.5 q4hrs (5 day course) today is day 5  - Blood culture (2/19) NGTD and sputum culture (2/20 - collected)   - blood culture NGTD from 2/16  - Wean O2 as tolerated  - incentive spirometry  - Chest PT   - OOBTC Pt w/ worsening cough and voice hoarseness i/s/o daily emesis, found to have mild leukocytosis, neutrophilic predominance, and lactate of 2.5 w/ left lobe infiltrate on CXR. Initially hypotensive on presentation to ED 80s/60s. now improved after 2.5 L NS. Likely aspiration PNA 2/2 intractable vomiting vs CAP. s/p 1 dose of unasyn in the ED, started on ceftx 2g q24 which was transitioned to Zosyn with pseudomonal coverage due to concern for aspiration pneumonia post endoscopy. Patient spiked fever 2/19 with increase O2 requirement to 50/100 weaned to 50/90. MRSA swab was negative. POCUS showing RV dilation. Patient was diuresed with 20 mg lasix   On 2/20 Increased O2 requirement at night requiring increasing the O2. WBC count wnl, Temp below Tmax in past 24 hrs. Currently on HFNC on 60L. Continue management as below   Plan:  - C/w Zosyn 4.5 q4hrs (5 day course) today is day 5  - Blood culture (2/19) NGTD and sputum culture (2/20 - collected)   - blood culture NGTD from 2/16  - Wean O2 as tolerated  - incentive spirometry  - Chest PT   - OOBTC

## 2023-02-22 NOTE — PROGRESS NOTE ADULT - SUBJECTIVE AND OBJECTIVE BOX
Physical Medicine and Rehabilitation Progress Note :       Patient is a 77y old  Female who presents with a chief complaint of Aspiration PNA, hematemesis w/ known esophageal ulcer (22 Feb 2023 10:57)      HPI:  76 y/o F PMH of hiatal hernia, esophageal ulcer (diagnosed at [facility] in 1/2023), HLD, depression (on aripiprazole and paroxetine), and seizure disorder (last seizure 2 years ago, on lamotrigine), p/w 6 days of dark black emesis after drinking a glass of wine last Friday. Pt was recently evaluated at [facility] for similar symptoms, found to have an esophageal ulcer on EGD, and was discharged on carafate and protonix.  She has since had daily episodes of dark black emesis, dry cough, and hoarseness of her voice. She also c/o decreased appetite and general malaise. She denies any abdominal pain, shortness of breath, diarrhea, chest pain, palpitations, headaches, dizziness, dysuria, constipation, melena, or hematochezia.     In the ED:  Initial vital signs: T: 96.9 F, HR: 84, BP: 87/62--> 110/56 s/p, R: 18, SpO2: 93% on RA--> 100% on 3L  Labs: WBC 11.43, Hgb 9.0, MCV 94.7 Neutrophils 10.33, lactate 2.5-->0.9, K 3.3, BUN 44, Cr 1.77, pH 7.28  CXR: Left lung consolidation  Medications: unasyn x1, protonix IV x1, 2.5 L NS     (16 Feb 2023 17:07)                            8.1    9.25  )-----------( 286      ( 22 Feb 2023 05:30 )             26.2       02-22    136  |  95<L>  |  6<L>  ----------------------------<  119<H>  4.2   |  35<H>  |  0.74    Ca    9.2      22 Feb 2023 05:30  Phos  2.9     02-22  Mg     1.8     02-22    TPro  5.3<L>  /  Alb  2.5<L>  /  TBili  0.2  /  DBili  x   /  AST  32  /  ALT  63<H>  /  AlkPhos  187<H>  02-22    Vital Signs Last 24 Hrs  T(C): 37.3 (22 Feb 2023 10:33), Max: 37.3 (21 Feb 2023 17:29)  T(F): 99.1 (22 Feb 2023 10:33), Max: 99.2 (21 Feb 2023 17:29)  HR: 76 (22 Feb 2023 12:55) (60 - 80)  BP: 112/50 (22 Feb 2023 12:55) (96/54 - 136/68)  BP(mean): 67 (22 Feb 2023 12:55) (67 - 93)  RR: 18 (22 Feb 2023 12:55) (16 - 20)  SpO2: 94% (22 Feb 2023 12:55) (90% - 100%)    Parameters below as of 22 Feb 2023 12:55  Patient On (Oxygen Delivery Method): nasal cannula, high flow  O2 Flow (L/min): 60  O2 Concentration (%): 60    MEDICATIONS  (STANDING):  ARIPiprazole 2 milliGRAM(s) Oral every 24 hours  heparin  Infusion 1300 Unit(s)/Hr (17 mL/Hr) IV Continuous <Continuous>  lamoTRIgine 100 milliGRAM(s) Oral every 24 hours  pantoprazole    Tablet 40 milliGRAM(s) Oral every 12 hours  PARoxetine 30 milliGRAM(s) Oral every 24 hours  piperacillin/tazobactam IVPB.. 4.5 Gram(s) IV Intermittent every 8 hours  sucralfate suspension 1 Gram(s) Oral every 6 hours    MEDICATIONS  (PRN):  acetaminophen     Tablet .. 650 milliGRAM(s) Oral every 6 hours PRN Temp greater or equal to 38C (100.4F)  ondansetron Injectable 4 milliGRAM(s) IV Push every 8 hours PRN Nausea and/or Vomiting      Occupational Therapy Functional Status Assessment :       Safety      AM-Located within [facility] Functional Assessment: Daily Activity  Type of Assessment: Daily assessment  Putting on and taking off regular lower body clothing?: 2 = A lot of assistance  Bathing (including washing, rinsing, drying)?: 2 = A lot of assistance  Toileting, which includes using toilet, bedpan or urinal?: 2 = A lot of assistance  Putting on and taking off regular upper body clothing?: 2 = A lot of assistance  Take care of personal grooming such as brushing teeth?: 3 = A little assistance  Eating meals?: 4 = No assist / stand by assistance  Score: 15   Row Comment: Ask the patient "How much help from another person do you currently need? (If the patient hasn't done an activity recently, how much help from another person do you think he/she needs if he/she tried?)    Cognitive/Neuro      Cognitive/Neuro/Behavioral  Cognitive/Neuro/Behavioral [WDL Definition: Alert; opens eyes spontaneously; arouses to voice or touch; oriented x 4; follows commands; speech spontaneous, logical; purposeful motor response; behavior appropriate to situation]: WDL    Language Assistance  Preferred Language to Address Healthcare Preferred Language to Address Healthcare: English    Therapeutic Interventions      Bed Mobility  Bed Mobility Training Rolling/Turning: minimum assist (75% patient effort);  1 person assist;  verbal cues;  nonverbal cues (demo/gestures)  Bed Mobility Training Scooting: minimum assist (75% patient effort);  2 person assist;  verbal cues;  nonverbal cues (demo/gestures)  Bed Mobility Training Sit-to-Supine: moderate assist (50% patient effort);  2 person assist  Bed Mobility Training Supine-to-Sit: moderate assist (50% patient effort);  2 person assist;  verbal cues;  nonverbal cues (demo/gestures)  Bed Mobility Training Limitations: decreased strength;  impaired postural control;  faitgue ;  decreased ability to use arms for pushing/pulling;  impaired ability to control trunk for mobility;  decreased ability to use legs for bridging/pushing    Sit-Stand Transfer Training  Transfer Training Sit-to-Stand Transfer: minimum assist (75% patient effort);  2 person assist;  full weight-bearing   rolling walker  Transfer Training Stand-to-Sit Transfer: minimum assist (75% patient effort);  2 person assist;  verbal cues;  nonverbal cues (demo/gestures);  full weight-bearing   rolling walker  Sit-to-Stand Transfer Training Transfer Safety Analysis: decreased strength;  impaired postural control;  impaired balance    Therapeutic Exercise  Therapeutic Exercise Detail: patient sat EOB for approx 8 minutes to engage in BUE/BLE ther ex ( knee flexion/extension, DF/PF, and shoulder flex 1x10 reps), and trunk control and strengthening exercises ( dynamic reaching b/l 1x10 reps)     Lower Body Dressing Training  Lower Body Dressing Training Charges: don/doff b/l socks   Lower Body Dressing Training Assistance: contact guard;  1 person assist;  decreased strength;  impaired balance    Toilet Training  Toilet Training Charges: pernieal hygiene while standing   Toilet Training Assistance: dependent (less than 25% patient effort);  1 person assist;  decreased strength;  impaired balance      PM&R Impression : as above    Current Disposition Plan Recommendations :    subacute rehab placement

## 2023-02-22 NOTE — PROVIDER CONTACT NOTE (CRITICAL VALUE NOTIFICATION) - ACTION/TREATMENT ORDERED:
----- Message from Miranda Hawk sent at 2020  1:07 PM CDT -----  Regarding: Self  Tito Prince  MRN: [id number]  : 1995  PCP: Freeman Saavedra  Home Phone      [phone]  Work Phone      Not on file.  Mobile          [phone]      MESSAGE:   Pt states her birth control patch fell off and she would like to know if she can get another called in. Please return call @ [phone]. Thanks.      as per MD Rajan decreased heparin rate to 2ml/hr, will continue to monitor will continue to monitor as per MD Rajan infuse heparin at 13ml/hr now. will continue to monitor

## 2023-02-22 NOTE — PROGRESS NOTE ADULT - ASSESSMENT
76 y/o F w/ hx of hiatal hernia and esophageal ulcer, presenting with 6 days of dark black emesis, hoarseness, and anorexia, admitted for UGIB and aspiration PNA. Patient has been on zosyn for 3 days for aspitation PNA treatment however has continued to spike fevers currently undergoing further work up     Problem/Plan - 1:  ·  Problem: Pulmonary embolism.   ·  Plan: Patient with intermittent fevers and increasing O2 requirement with max settings on HFNC but good saturation   CTA Chest w/ numerous bilateral pulmonary emboli, saddle embolus on the right with extension into multiple upper and lower lobe pulmonary arteries. US Deep venous thrombosis in bilateral calf veins. Acute deep venous thrombosis: below the knee. TTE showing dilated right ventricular size. Reduced right ventricular systolic function. Dilated right atrium. PERT team following as well as structural heart  Plan:  - Per Dr. Montejo, no thrombectomy, c/w heparin as long as no active bleeding  - F/u GI about transitioning to oral AC.    Problem/Plan - 2:  ·  Problem: Esophageal ulcer.   ·  Plan: Presented with UGIB w/ associated coffee-ground emesis. Pt w/ known hx of esophageal ulcer, diagnosed at [facility] in 1/2023, discharged on carafate and protonix with surgery f/u for hiatal hernia repair. Presented w/ 6 days of dark black emesis. No signs of active bleeding currently, Hgb stable. GI consulted, endoscopy done showing large sized paraesophageal hiatal hernia, Grade D esophagitis with no bleeding was seen starting at 30 cm from the incisors in the lower third of the esophagus and middle third of the esophagus, compatible with nonspecific erosive esophagitis. This is likely the source of coffee ground emesis. Patchy erythema of the mucosa with no bleeding was noted in the stomach body. These findings are compatible with non-erosive gastritis  Plan:  - Pantoprazole 40 mg PO BID x 2 weeks then daily  - Carafate Suspension 1g po qid  x 2 weeks.    Problem/Plan - 3:  ·  Problem: Aspiration pneumonia of left lung.   ·  Plan: Pt w/ worsening cough and voice hoarseness i/s/o daily emesis, found to have mild leukocytosis, neutrophilic predominance, and lactate of 2.5 w/ left lobe infiltrate on CXR. Initially hypotensive on presentation to ED 80s/60s. now improved after 2.5 L NS. Likely aspiration PNA 2/2 intractable vomiting vs CAP. s/p 1 dose of unasyn in the ED, started on ceftx 2g q24 which was transitioned to Zosyn with pseudomonal coverage due to concern for aspiration pneumonia post endoscopy. Patient spiked fever 2/19 with increase O2 requirement to 50/100 weaned to 50/90. MRSA swab was negative. POCUS showing RV dilation. Patient was diuresed with 20 mg lasix   On 2/20 Increased O2 requirement at night requiring increasing the O2. WBC count wnl, Temp below Tmax in past 24 hrs. Currently on HFNC on 60L. Continue management as below   Plan:  - C/w Zosyn 4.5 q4hrs (5 day course) today is day 5  - Blood culture (2/19) NGTD and sputum culture (2/20 - collected)   - blood culture NGTD from 2/16  - Wean O2 as tolerated  - incentive spirometry  - Chest PT   - OOBTC.    Problem/Plan - 4:  ·  Problem: Anemia.   ·  Plan: MCV 94 2/2 to active hematemesis i/s/o known esophageal ulcer. Iron 20, Ferritin 154, total binding capacity 164, %sat 12. Hgb remains stable, 8.1 today.  Plan:  - Trend CBC  - Maintain active T&S (order for 2/18)  - transfuse if Hgb <7.    Problem/Plan - 5:  ·  Problem: Anxiety and depression.   ·  Plan: On home abilify 2 mg QD, paxil 40 qd, and lorazepam 1g PRN.  - C/w abilify and paxil  - Hold lorazepam.    Problem/Plan - 6:  ·  Problem: HTN (hypertension).   ·  Plan: On home losartan 100 mg QD.  - hold i/s/o hypotension, active UGIB  - restart as clinically indicated.    Problem/Plan - 7:  ·  Problem: HLD (hyperlipidemia).   ·  Plan: On home atorvastatin 20 mg  - c/w home meds.    Problem/Plan - 8:  ·  Problem: Prophylactic measure.   ·  Plan: F: none  E: replete K<4, Mg<2  N: NPO at midnight   VTE Prophylaxis: hold i/s/o active UGIB  GI: protonix ggt  C: DNR/DNI (new molst in chart)  D: RMF.

## 2023-02-22 NOTE — PROGRESS NOTE ADULT - PROBLEM SELECTOR PLAN 1
Patient with intermittent fevers and increasing O2 requirement with max settings on HFNC but good saturation   CTA Chest w/ numerous bilateral pulmonary emboli, saddle embolus on the right with extension into multiple upper and lower lobe pulmonary arteries. US Deep venous thrombosis in bilateral calf veins. Acute deep venous thrombosis: below the knee. TTE showing dilated right ventricular size. Reduced right ventricular systolic function. Dilated right atrium.  Plan:  - PERT team consulted. F/u Dr. Montejo's recs   - c/w heparin gtt for now   - will screen for HI PEITHO   - Structural heart consulted: Hep gtt started per primary team, will need to discuss risk of GIB with long-term AC with GI team. May need IVC filter if not able to tolerate AC  - Hold off on Inari trial for now given multiple active medical issues Patient with intermittent fevers and increasing O2 requirement with max settings on HFNC but good saturation   CTA Chest w/ numerous bilateral pulmonary emboli, saddle embolus on the right with extension into multiple upper and lower lobe pulmonary arteries. US Deep venous thrombosis in bilateral calf veins. Acute deep venous thrombosis: below the knee. TTE showing dilated right ventricular size. Reduced right ventricular systolic function. Dilated right atrium. PERT team following as well as structural heart  Plan:  - Per Dr. Montejo, no thrombectomy, c/w heparin as long as no active bleeding  - F/u GI about transitioning to oral AC

## 2023-02-22 NOTE — PROGRESS NOTE ADULT - SUBJECTIVE AND OBJECTIVE BOX
NATHEN CHOW, 77y, Female  MRN-[id number]  Patient is a 77y old  Female who presents with a chief complaint of PNEUMONIA     (21 Feb 2023 15:27)      OVERNIGHT EVENTS:     SUBJECTIVE:    12 Point ROS Negative unless noted otherwise above.  -------------------------------------------------------------------------------  VITAL SIGNS:  Vital Signs Last 24 Hrs  T(C): 37.1 (22 Feb 2023 05:00), Max: 37.3 (21 Feb 2023 17:29)  T(F): 98.8 (22 Feb 2023 05:00), Max: 99.2 (21 Feb 2023 17:29)  HR: 76 (22 Feb 2023 06:28) (60 - 76)  BP: 119/57 (22 Feb 2023 04:15) (111/60 - 136/68)  BP(mean): 80 (22 Feb 2023 04:15) (75 - 93)  RR: 18 (22 Feb 2023 04:15) (16 - 20)  SpO2: 92% (22 Feb 2023 06:28) (91% - 100%)    Parameters below as of 22 Feb 2023 06:28  Patient On (Oxygen Delivery Method): nasal cannula, high flow  O2 Flow (L/min): 60  O2 Concentration (%): 90  I&O's Summary    21 Feb 2023 07:01  -  22 Feb 2023 07:00  --------------------------------------------------------  IN: 166 mL / OUT: 1450 mL / NET: -1284 mL        PHYSICAL EXAM:    General: NAD, well developed  HEENT: NC/AT; EOMI, PERRLA, anicteric sclera; moist mucosal membranes.  Neck: supple, trachea midline  Cardiovascular: RRR, +S1/S2; NO M/R/G  Respiratory: CTA B/L; no W/R/R  Gastrointestinal: soft, NT/ND; +BSx4  Extremities: WWP; no edema or cyanosis  Vascular: 2+ radial, DP/PT pulses B/L  Neurological: AAOx3; no focal deficits    ALLERGIES:  Allergies    lanolin topical (Rash)    Intolerances        MEDICATIONS:  MEDICATIONS  (STANDING):  ARIPiprazole 2 milliGRAM(s) Oral every 24 hours  guaiFENesin Oral Liquid (Sugar-Free) 100 milliGRAM(s) Oral every 6 hours  heparin  Infusion 1300 Unit(s)/Hr (13 mL/Hr) IV Continuous <Continuous>  lamoTRIgine 100 milliGRAM(s) Oral every 24 hours  pantoprazole    Tablet 40 milliGRAM(s) Oral every 12 hours  PARoxetine 30 milliGRAM(s) Oral every 24 hours  piperacillin/tazobactam IVPB.. 4.5 Gram(s) IV Intermittent every 8 hours  sucralfate suspension 1 Gram(s) Oral every 6 hours    MEDICATIONS  (PRN):  acetaminophen     Tablet .. 650 milliGRAM(s) Oral every 6 hours PRN Temp greater or equal to 38C (100.4F)  ondansetron Injectable 4 milliGRAM(s) IV Push every 8 hours PRN Nausea and/or Vomiting      -------------------------------------------------------------------------------  LABS:                        8.1    9.25  )-----------( 286      ( 22 Feb 2023 05:30 )             26.2     02-22    136  |  95<L>  |  6<L>  ----------------------------<  119<H>  4.2   |  35<H>  |  0.74    Ca    9.2      22 Feb 2023 05:30  Phos  2.9     02-22  Mg     1.8     02-22    TPro  5.3<L>  /  Alb  2.5<L>  /  TBili  0.2  /  DBili  x   /  AST  32  /  ALT  63<H>  /  AlkPhos  187<H>  02-22    LIVER FUNCTIONS - ( 22 Feb 2023 05:30 )  Alb: 2.5 g/dL / Pro: 5.3 g/dL / ALK PHOS: 187 U/L / ALT: 63 U/L / AST: 32 U/L / GGT: x           PT/INR - ( 21 Feb 2023 12:13 )   PT: 13.9 sec;   INR: 1.17          PTT - ( 22 Feb 2023 05:30 )  PTT:83.4 sec  Urinalysis Basic - ( 20 Feb 2023 09:22 )    Color: Yellow / Appearance: Clear / SG: >=1.030 / pH: x  Gluc: x / Ketone: Trace mg/dL  / Bili: Negative / Urobili: 0.2 E.U./dL   Blood: x / Protein: 30 mg/dL / Nitrite: NEGATIVE   Leuk Esterase: NEGATIVE / RBC: < 5 /HPF / WBC < 5 /HPF   Sq Epi: x / Non Sq Epi: 0-5 /HPF / Bacteria: Present /HPF      CAPILLARY BLOOD GLUCOSE          Culture - Urine (collected 20 Feb 2023 14:18)  Source: Catheterized Catheterized  Preliminary Report (21 Feb 2023 14:21):    Culture in progress    Culture - Sputum (collected 20 Feb 2023 14:18)  Source: .Sputum Sputum  Gram Stain (20 Feb 2023 22:18):    Moderate epithelial cells    Rare WBC's    Rare Yeast    Few Gram Positive Cocci in Clusters  Final Report (20 Feb 2023 22:18):    Sputum specimen rejected.  Microscopic examination indicates    oropharyngeal contamination.  Please repeat.      COVID-19 PCR: Negative (16 Feb 2023 10:56)      RADIOLOGY & ADDITIONAL TESTS: Reviewed.       CLAUDINENATHEN ANDRES, 77y, Female  MRN-[id number]  Patient is a 77y old  Female who presents with a chief complaint of PNEUMONIA     (21 Feb 2023 15:27)      OVERNIGHT EVENTS: SHI    SUBJECTIVE: Pt assessed at bedside, states she feels well, denies any new complaints. Has occasional cough but minimally productive. Had a bowel movement yesterday, brown in color, no blood or melena. Pt denies fever/chills, nausea, vomiting, headache, chest pain, palpitations, dyspnea, constipation, diarrhea, abdominal pain.    12 Point ROS Negative unless noted otherwise above.  -------------------------------------------------------------------------------  VITAL SIGNS:  Vital Signs Last 24 Hrs  T(C): 37.1 (22 Feb 2023 05:00), Max: 37.3 (21 Feb 2023 17:29)  T(F): 98.8 (22 Feb 2023 05:00), Max: 99.2 (21 Feb 2023 17:29)  HR: 76 (22 Feb 2023 06:28) (60 - 76)  BP: 119/57 (22 Feb 2023 04:15) (111/60 - 136/68)  BP(mean): 80 (22 Feb 2023 04:15) (75 - 93)  RR: 18 (22 Feb 2023 04:15) (16 - 20)  SpO2: 92% (22 Feb 2023 06:28) (91% - 100%)    Parameters below as of 22 Feb 2023 06:28  Patient On (Oxygen Delivery Method): nasal cannula, high flow  O2 Flow (L/min): 60  O2 Concentration (%): 90  I&O's Summary    21 Feb 2023 07:01  -  22 Feb 2023 07:00  --------------------------------------------------------  IN: 166 mL / OUT: 1450 mL / NET: -1284 mL        PHYSICAL EXAM:    General: In bed in no acute distress on HFNC   HEENT: NCAT; PERRL, anicteric sclera; MMM  Neck: supple, trachea midline  Cardiovascular: S1, S2 normal; RRR, no M/G/R  Respiratory: decreased breath sounds at bases of lungs  Gastrointestinal: soft, nontender, nondistended. bowel sounds present.  Skin: no ulcerations or visible rashes appreciated  Extremities: WWP; no edema, clubbing or cyanosis  Vascular: 2+ radial, DP/PT pulses B/L  Neurological: AAOx3; CN II-XII grossly intact; no focal deficits    ALLERGIES:  Allergies    lanolin topical (Rash)    Intolerances        MEDICATIONS:  MEDICATIONS  (STANDING):  ARIPiprazole 2 milliGRAM(s) Oral every 24 hours  guaiFENesin Oral Liquid (Sugar-Free) 100 milliGRAM(s) Oral every 6 hours  heparin  Infusion 1300 Unit(s)/Hr (13 mL/Hr) IV Continuous <Continuous>  lamoTRIgine 100 milliGRAM(s) Oral every 24 hours  pantoprazole    Tablet 40 milliGRAM(s) Oral every 12 hours  PARoxetine 30 milliGRAM(s) Oral every 24 hours  piperacillin/tazobactam IVPB.. 4.5 Gram(s) IV Intermittent every 8 hours  sucralfate suspension 1 Gram(s) Oral every 6 hours    MEDICATIONS  (PRN):  acetaminophen     Tablet .. 650 milliGRAM(s) Oral every 6 hours PRN Temp greater or equal to 38C (100.4F)  ondansetron Injectable 4 milliGRAM(s) IV Push every 8 hours PRN Nausea and/or Vomiting      -------------------------------------------------------------------------------  LABS:                        8.1    9.25  )-----------( 286      ( 22 Feb 2023 05:30 )             26.2     02-22    136  |  95<L>  |  6<L>  ----------------------------<  119<H>  4.2   |  35<H>  |  0.74    Ca    9.2      22 Feb 2023 05:30  Phos  2.9     02-22  Mg     1.8     02-22    TPro  5.3<L>  /  Alb  2.5<L>  /  TBili  0.2  /  DBili  x   /  AST  32  /  ALT  63<H>  /  AlkPhos  187<H>  02-22    LIVER FUNCTIONS - ( 22 Feb 2023 05:30 )  Alb: 2.5 g/dL / Pro: 5.3 g/dL / ALK PHOS: 187 U/L / ALT: 63 U/L / AST: 32 U/L / GGT: x           PT/INR - ( 21 Feb 2023 12:13 )   PT: 13.9 sec;   INR: 1.17          PTT - ( 22 Feb 2023 05:30 )  PTT:83.4 sec  Urinalysis Basic - ( 20 Feb 2023 09:22 )    Color: Yellow / Appearance: Clear / SG: >=1.030 / pH: x  Gluc: x / Ketone: Trace mg/dL  / Bili: Negative / Urobili: 0.2 E.U./dL   Blood: x / Protein: 30 mg/dL / Nitrite: NEGATIVE   Leuk Esterase: NEGATIVE / RBC: < 5 /HPF / WBC < 5 /HPF   Sq Epi: x / Non Sq Epi: 0-5 /HPF / Bacteria: Present /HPF      CAPILLARY BLOOD GLUCOSE          Culture - Urine (collected 20 Feb 2023 14:18)  Source: Catheterized Catheterized  Preliminary Report (21 Feb 2023 14:21):    Culture in progress    Culture - Sputum (collected 20 Feb 2023 14:18)  Source: .Sputum Sputum  Gram Stain (20 Feb 2023 22:18):    Moderate epithelial cells    Rare WBC's    Rare Yeast    Few Gram Positive Cocci in Clusters  Final Report (20 Feb 2023 22:18):    Sputum specimen rejected.  Microscopic examination indicates    oropharyngeal contamination.  Please repeat.      COVID-19 PCR: Negative (16 Feb 2023 10:56)      RADIOLOGY & ADDITIONAL TESTS: Reviewed.

## 2023-02-22 NOTE — PROGRESS NOTE ADULT - PROBLEM SELECTOR PLAN 2
#GIB  #Coffee-ground emesis  Pt w/ known hx of esophageal ulcer, diagnosed at [facility] in 1/2023, discharged on carafate and protonix with surgery f/u for hiatal hernia repair, now p/w 6 days of dark black emesis after drinking a glass of wine. Pt reports daily episodes of emesis, and has since developed a cough and hoarseness of her voice. Found to have mild leukocytosis, neutrophilic predominance, and lactate of 2.5 w/ left lobe infiltrate on CXR. Denies any bright red blood in her vomit, no abdominal pain, diarrhea, hematochezia, or melena. Denies any dizziness or lightheadedness.  Endoscopy findings:   A large size paraesophageal hiatal hernia was seen, the esophagogastric junction (EGJ) was noted at 40 cm, with hiatal narrowing at 34 cm from the incisors. Additional findings include ulceration. Retroflexion view in the stomach confirmed the size and morphology of the hernia.  -Grade D esophagitis with no bleeding was seen starting at 30 cm from the incisors in the lower third of the esophagus and middle third of the esophagus, compatible with nonspecific erosive esophagitis. This is likely the source of coffee ground emesis  -Localized irregularity of the mucosa was noted in the Z-line and gastroesophageal junction.  -Liquefied food material was found in the stomach. Findings were suggestive of gastroparesis/delayed gastric emptying.  -Patchy erythema of the mucosa with no bleeding was noted in the stomach body. These findings are compatible with non-erosive gastritis. Cold forceps biopsies were performed for H. pylori in the stomach body and stomach antrum.  -Normal appearing duodenum  Recs:   - Pantoprazole 40 mg PO BID x 2 weeks then daily  - Carafate Suspension 1g po qid  x 2 weeks Presented with UGIB w/ associated coffee-ground emesis. Pt w/ known hx of esophageal ulcer, diagnosed at [facility] in 1/2023, discharged on carafate and protonix with surgery f/u for hiatal hernia repair. Presented w/ 6 days of dark black emesis. No signs of active bleeding currently, Hgb stable. GI consulted, endoscopy done showing large sized paraesophageal hiatal hernia, Grade D esophagitis with no bleeding was seen starting at 30 cm from the incisors in the lower third of the esophagus and middle third of the esophagus, compatible with nonspecific erosive esophagitis. This is likely the source of coffee ground emesis  -Localized irregularity of the mucosa was noted in the Z-line and gastroesophageal junction.  -Liquefied food material was found in the stomach. Findings were suggestive of gastroparesis/delayed gastric emptying.  -Patchy erythema of the mucosa with no bleeding was noted in the stomach body. These findings are compatible with non-erosive gastritis. Cold forceps biopsies were performed for H. pylori in the stomach body and stomach antrum.  -Normal appearing duodenum  Recs:   - Pantoprazole 40 mg PO BID x 2 weeks then daily  - Carafate Suspension 1g po qid  x 2 weeks Presented with UGIB w/ associated coffee-ground emesis. Pt w/ known hx of esophageal ulcer, diagnosed at [facility] in 1/2023, discharged on carafate and protonix with surgery f/u for hiatal hernia repair. Presented w/ 6 days of dark black emesis. No signs of active bleeding currently, Hgb stable. GI consulted, endoscopy done showing large sized paraesophageal hiatal hernia, Grade D esophagitis with no bleeding was seen starting at 30 cm from the incisors in the lower third of the esophagus and middle third of the esophagus, compatible with nonspecific erosive esophagitis. This is likely the source of coffee ground emesis. Patchy erythema of the mucosa with no bleeding was noted in the stomach body. These findings are compatible with non-erosive gastritis  Plan:  - Pantoprazole 40 mg PO BID x 2 weeks then daily  - Carafate Suspension 1g po qid  x 2 weeks

## 2023-02-22 NOTE — PROGRESS NOTE ADULT - SUBJECTIVE AND OBJECTIVE BOX
Patient discussed on morning rounds with Dr. Dickens    OPERATION & DATE: NA    SUBJECTIVE ASSESSMENT: Pt feeling well. She denies any continued dark or bloody stools and appears to be tolerating heparin drip well. Still on HFNC. Denying any CP or SOB at this time.     VITAL SIGNS:  Vital Signs Last 24 Hrs  T(C): 37.3 (22 Feb 2023 10:33), Max: 37.3 (21 Feb 2023 17:29)  T(F): 99.1 (22 Feb 2023 10:33), Max: 99.2 (21 Feb 2023 17:29)  HR: 78 (22 Feb 2023 08:41) (60 - 80)  BP: 98/54 (22 Feb 2023 08:41) (96/54 - 136/68)  BP(mean): 72 (22 Feb 2023 08:41) (71 - 93)  RR: 18 (22 Feb 2023 08:41) (16 - 20)  SpO2: 90% (22 Feb 2023 08:41) (90% - 100%)    Parameters below as of 22 Feb 2023 08:41  Patient On (Oxygen Delivery Method): nasal cannula, high flow  O2 Flow (L/min): 60  O2 Concentration (%): 70  I&O's Detail    21 Feb 2023 07:01  -  22 Feb 2023 07:00  --------------------------------------------------------  IN:    Heparin: 75 mL    Heparin: 91 mL  Total IN: 166 mL    OUT:    Voided (mL): 1450 mL  Total OUT: 1450 mL    Total NET: -1284 mL          PHYSICAL EXAM:  General: resting comfortably in bed in NAD  Neurological: AOx3. Motor skills grossly intact  Cardiovascular: Normal S1/S2. Regular rate/rhythm. No murmurs  Respiratory: Lungs CTA bilaterally. No wheezing or rales  Gastrointestinal: +BS in all 4 quadrants. Non-distended. Soft. Non-tender  Extremities: Strength 5/5 b/l upper/lower extremities. Sensation grossly intact upper/lower extremities. No edema. No calf tenderness.  Vascular: Radial 2+bilaterally, DP 2+ b/l  Incision Sites: NA      LABS:                        8.1    9.25  )-----------( 286      ( 22 Feb 2023 05:30 )             26.2     PT/INR - ( 21 Feb 2023 12:13 )   PT: 13.9 sec;   INR: 1.17          PTT - ( 22 Feb 2023 05:30 )  PTT:83.4 sec  02-22    136  |  95<L>  |  6<L>  ----------------------------<  119<H>  4.2   |  35<H>  |  0.74    Ca    9.2      22 Feb 2023 05:30  Phos  2.9     02-22  Mg     1.8     02-22    TPro  5.3<L>  /  Alb  2.5<L>  /  TBili  0.2  /  DBili  x   /  AST  32  /  ALT  63<H>  /  AlkPhos  187<H>  02-22      MEDICATIONS  (STANDING):  ARIPiprazole 2 milliGRAM(s) Oral every 24 hours  guaiFENesin Oral Liquid (Sugar-Free) 100 milliGRAM(s) Oral every 6 hours  heparin  Infusion 1300 Unit(s)/Hr (13 mL/Hr) IV Continuous <Continuous>  lamoTRIgine 100 milliGRAM(s) Oral every 24 hours  pantoprazole    Tablet 40 milliGRAM(s) Oral every 12 hours  PARoxetine 30 milliGRAM(s) Oral every 24 hours  piperacillin/tazobactam IVPB.. 4.5 Gram(s) IV Intermittent every 8 hours  sucralfate suspension 1 Gram(s) Oral every 6 hours    MEDICATIONS  (PRN):  acetaminophen     Tablet .. 650 milliGRAM(s) Oral every 6 hours PRN Temp greater or equal to 38C (100.4F)  ondansetron Injectable 4 milliGRAM(s) IV Push every 8 hours PRN Nausea and/or Vomiting    RADIOLOGY & ADDITIONAL TESTS:    < from: TTE Echo Complete w/o Contrast w/ Doppler (02.21.23 @ 08:59) >  -----  CONCLUSIONS:     1. Normal left ventricular size and systolic function.   2. Moderately dilated right ventricular size.   3. Mildly reduced right ventricular systolic function.   4. Moderate-to-severe tricuspid regurgitation.   5. Pulmonary hypertension present, pulmonary artery systolic pressure is   66 mmHg.   6. No pericardial effusion.   7. Compared to the previous TTE performed on 2/21/2023, LV function is   improved but TR is worse.    < end of copied text >

## 2023-02-23 DIAGNOSIS — I27.29 OTHER SECONDARY PULMONARY HYPERTENSION: ICD-10-CM

## 2023-02-23 DIAGNOSIS — I50.811 ACUTE RIGHT HEART FAILURE: ICD-10-CM

## 2023-02-23 DIAGNOSIS — J96.01 ACUTE RESPIRATORY FAILURE WITH HYPOXIA: ICD-10-CM

## 2023-02-23 LAB
ALBUMIN SERPL ELPH-MCNC: 2.2 G/DL — LOW (ref 3.3–5)
ALP SERPL-CCNC: 154 U/L — HIGH (ref 40–120)
ALT FLD-CCNC: 42 U/L — SIGNIFICANT CHANGE UP (ref 10–45)
ANION GAP SERPL CALC-SCNC: 6 MMOL/L — SIGNIFICANT CHANGE UP (ref 5–17)
APTT BLD: 30 SEC — SIGNIFICANT CHANGE UP (ref 27.5–35.5)
AST SERPL-CCNC: 18 U/L — SIGNIFICANT CHANGE UP (ref 10–40)
BASOPHILS # BLD AUTO: 0.07 K/UL — SIGNIFICANT CHANGE UP (ref 0–0.2)
BASOPHILS NFR BLD AUTO: 0.8 % — SIGNIFICANT CHANGE UP (ref 0–2)
BILIRUB SERPL-MCNC: <0.2 MG/DL — SIGNIFICANT CHANGE UP (ref 0.2–1.2)
BUN SERPL-MCNC: 4 MG/DL — LOW (ref 7–23)
CALCIUM SERPL-MCNC: 8.8 MG/DL — SIGNIFICANT CHANGE UP (ref 8.4–10.5)
CHLORIDE SERPL-SCNC: 97 MMOL/L — SIGNIFICANT CHANGE UP (ref 96–108)
CO2 SERPL-SCNC: 32 MMOL/L — HIGH (ref 22–31)
CREAT SERPL-MCNC: 0.64 MG/DL — SIGNIFICANT CHANGE UP (ref 0.5–1.3)
EGFR: 90 ML/MIN/1.73M2 — SIGNIFICANT CHANGE UP
EOSINOPHIL # BLD AUTO: 0.29 K/UL — SIGNIFICANT CHANGE UP (ref 0–0.5)
EOSINOPHIL NFR BLD AUTO: 3.1 % — SIGNIFICANT CHANGE UP (ref 0–6)
GLUCOSE SERPL-MCNC: 109 MG/DL — HIGH (ref 70–99)
HCT VFR BLD CALC: 27.3 % — LOW (ref 34.5–45)
HGB BLD-MCNC: 8.3 G/DL — LOW (ref 11.5–15.5)
IMM GRANULOCYTES NFR BLD AUTO: 1.1 % — HIGH (ref 0–0.9)
LYMPHOCYTES # BLD AUTO: 0.86 K/UL — LOW (ref 1–3.3)
LYMPHOCYTES # BLD AUTO: 9.2 % — LOW (ref 13–44)
MAGNESIUM SERPL-MCNC: 1.9 MG/DL — SIGNIFICANT CHANGE UP (ref 1.6–2.6)
MCHC RBC-ENTMCNC: 29.7 PG — SIGNIFICANT CHANGE UP (ref 27–34)
MCHC RBC-ENTMCNC: 30.4 GM/DL — LOW (ref 32–36)
MCV RBC AUTO: 97.8 FL — SIGNIFICANT CHANGE UP (ref 80–100)
MONOCYTES # BLD AUTO: 0.63 K/UL — SIGNIFICANT CHANGE UP (ref 0–0.9)
MONOCYTES NFR BLD AUTO: 6.8 % — SIGNIFICANT CHANGE UP (ref 2–14)
NEUTROPHILS # BLD AUTO: 7.38 K/UL — SIGNIFICANT CHANGE UP (ref 1.8–7.4)
NEUTROPHILS NFR BLD AUTO: 79 % — HIGH (ref 43–77)
NRBC # BLD: 0 /100 WBCS — SIGNIFICANT CHANGE UP (ref 0–0)
PHOSPHATE SERPL-MCNC: 3 MG/DL — SIGNIFICANT CHANGE UP (ref 2.5–4.5)
PLATELET # BLD AUTO: 284 K/UL — SIGNIFICANT CHANGE UP (ref 150–400)
POTASSIUM SERPL-MCNC: 3.8 MMOL/L — SIGNIFICANT CHANGE UP (ref 3.5–5.3)
POTASSIUM SERPL-SCNC: 3.8 MMOL/L — SIGNIFICANT CHANGE UP (ref 3.5–5.3)
PROT SERPL-MCNC: 4.7 G/DL — LOW (ref 6–8.3)
RBC # BLD: 2.79 M/UL — LOW (ref 3.8–5.2)
RBC # FLD: 15.3 % — HIGH (ref 10.3–14.5)
SODIUM SERPL-SCNC: 135 MMOL/L — SIGNIFICANT CHANGE UP (ref 135–145)
WBC # BLD: 9.33 K/UL — SIGNIFICANT CHANGE UP (ref 3.8–10.5)
WBC # FLD AUTO: 9.33 K/UL — SIGNIFICANT CHANGE UP (ref 3.8–10.5)

## 2023-02-23 PROCEDURE — 99233 SBSQ HOSP IP/OBS HIGH 50: CPT | Mod: GC

## 2023-02-23 RX ADMIN — ENOXAPARIN SODIUM 85 MILLIGRAM(S): 100 INJECTION SUBCUTANEOUS at 21:26

## 2023-02-23 RX ADMIN — Medication 1 GRAM(S): at 14:41

## 2023-02-23 RX ADMIN — ARIPIPRAZOLE 2 MILLIGRAM(S): 15 TABLET ORAL at 12:03

## 2023-02-23 RX ADMIN — Medication 1 GRAM(S): at 19:01

## 2023-02-23 RX ADMIN — LAMOTRIGINE 100 MILLIGRAM(S): 25 TABLET, ORALLY DISINTEGRATING ORAL at 08:53

## 2023-02-23 RX ADMIN — PIPERACILLIN AND TAZOBACTAM 25 GRAM(S): 4; .5 INJECTION, POWDER, LYOPHILIZED, FOR SOLUTION INTRAVENOUS at 22:11

## 2023-02-23 RX ADMIN — ENOXAPARIN SODIUM 85 MILLIGRAM(S): 100 INJECTION SUBCUTANEOUS at 08:54

## 2023-02-23 RX ADMIN — PIPERACILLIN AND TAZOBACTAM 25 GRAM(S): 4; .5 INJECTION, POWDER, LYOPHILIZED, FOR SOLUTION INTRAVENOUS at 05:59

## 2023-02-23 RX ADMIN — Medication 1 GRAM(S): at 08:25

## 2023-02-23 RX ADMIN — PIPERACILLIN AND TAZOBACTAM 25 GRAM(S): 4; .5 INJECTION, POWDER, LYOPHILIZED, FOR SOLUTION INTRAVENOUS at 14:44

## 2023-02-23 RX ADMIN — Medication 30 MILLIGRAM(S): at 08:53

## 2023-02-23 RX ADMIN — PANTOPRAZOLE SODIUM 40 MILLIGRAM(S): 20 TABLET, DELAYED RELEASE ORAL at 06:00

## 2023-02-23 RX ADMIN — PANTOPRAZOLE SODIUM 40 MILLIGRAM(S): 20 TABLET, DELAYED RELEASE ORAL at 17:21

## 2023-02-23 NOTE — PROGRESS NOTE ADULT - SUBJECTIVE AND OBJECTIVE BOX
NATHEN CHOW, 77y, Female  MRN-[id number]  Patient is a 77y old  Female who presents with a chief complaint of Aspiration PNA, hematemesis w/ known esophageal ulcer (22 Feb 2023 13:27)      OVERNIGHT EVENTS:     SUBJECTIVE:    12 Point ROS Negative unless noted otherwise above.  -------------------------------------------------------------------------------  VITAL SIGNS:  Vital Signs Last 24 Hrs  T(C): 36.4 (23 Feb 2023 05:12), Max: 37.3 (22 Feb 2023 10:33)  T(F): 97.6 (23 Feb 2023 05:12), Max: 99.1 (22 Feb 2023 10:33)  HR: 65 (23 Feb 2023 05:52) (60 - 82)  BP: 116/55 (23 Feb 2023 04:27) (89/52 - 116/55)  BP(mean): 79 (23 Feb 2023 04:27) (65 - 79)  RR: 18 (23 Feb 2023 04:27) (18 - 19)  SpO2: 93% (23 Feb 2023 05:52) (85% - 100%)    Parameters below as of 23 Feb 2023 05:52  Patient On (Oxygen Delivery Method): nasal cannula, high flow  O2 Flow (L/min): 60  O2 Concentration (%): 60  I&O's Summary    22 Feb 2023 07:01  -  23 Feb 2023 07:00  --------------------------------------------------------  IN: 51 mL / OUT: 1250 mL / NET: -1199 mL        PHYSICAL EXAM:    General: NAD, well developed  HEENT: NC/AT; EOMI, PERRLA, anicteric sclera; moist mucosal membranes.  Neck: supple, trachea midline  Cardiovascular: RRR, +S1/S2; NO M/R/G  Respiratory: CTA B/L; no W/R/R  Gastrointestinal: soft, NT/ND; +BSx4  Extremities: WWP; no edema or cyanosis  Vascular: 2+ radial, DP/PT pulses B/L  Neurological: AAOx3; no focal deficits    ALLERGIES:  Allergies    lanolin topical (Rash)    Intolerances        MEDICATIONS:  MEDICATIONS  (STANDING):  ARIPiprazole 2 milliGRAM(s) Oral every 24 hours  enoxaparin Injectable 85 milliGRAM(s) SubCutaneous every 12 hours  lamoTRIgine 100 milliGRAM(s) Oral every 24 hours  pantoprazole    Tablet 40 milliGRAM(s) Oral every 12 hours  PARoxetine 30 milliGRAM(s) Oral every 24 hours  piperacillin/tazobactam IVPB.. 4.5 Gram(s) IV Intermittent every 8 hours  sucralfate suspension 1 Gram(s) Oral every 6 hours    MEDICATIONS  (PRN):  acetaminophen     Tablet .. 650 milliGRAM(s) Oral every 6 hours PRN Temp greater or equal to 38C (100.4F)  ondansetron Injectable 4 milliGRAM(s) IV Push every 8 hours PRN Nausea and/or Vomiting      -------------------------------------------------------------------------------  LABS:                        8.3    9.33  )-----------( 284      ( 23 Feb 2023 05:30 )             27.3     02-23    135  |  97  |  4<L>  ----------------------------<  109<H>  3.8   |  32<H>  |  0.64    Ca    8.8      23 Feb 2023 05:30  Phos  3.0     02-23  Mg     1.9     02-23    TPro  4.7<L>  /  Alb  2.2<L>  /  TBili  <0.2  /  DBili  x   /  AST  18  /  ALT  42  /  AlkPhos  154<H>  02-23    LIVER FUNCTIONS - ( 23 Feb 2023 05:30 )  Alb: 2.2 g/dL / Pro: 4.7 g/dL / ALK PHOS: 154 U/L / ALT: 42 U/L / AST: 18 U/L / GGT: x           PT/INR - ( 21 Feb 2023 12:13 )   PT: 13.9 sec;   INR: 1.17          PTT - ( 23 Feb 2023 05:30 )  PTT:30.0 sec    CAPILLARY BLOOD GLUCOSE          Culture - Urine (collected 20 Feb 2023 14:18)  Source: Catheterized Catheterized  Final Report (22 Feb 2023 13:14):    10,000 CFU/ml Candida albicans    Culture - Sputum (collected 20 Feb 2023 14:18)  Source: .Sputum Sputum  Gram Stain (20 Feb 2023 22:18):    Moderate epithelial cells    Rare WBC's    Rare Yeast    Few Gram Positive Cocci in Clusters  Final Report (20 Feb 2023 22:18):    Sputum specimen rejected.  Microscopic examination indicates    oropharyngeal contamination.  Please repeat.      COVID-19 PCR: Negative (16 Feb 2023 10:56)      RADIOLOGY & ADDITIONAL TESTS: Reviewed.       CLAUDINENATHEN ANDRES, 77y, Female  MRN-[id number]  Patient is a 77y old  Female who presents with a chief complaint of Aspiration PNA, hematemesis w/ known esophageal ulcer (22 Feb 2023 13:27)      OVERNIGHT EVENTS: Lovenox started at 10PM and heparin dc'd    SUBJECTIVE: Pt assessed at bedside, states she feels fine. States she had a bowel movement yesterday, remain nonbloody and brown. Denies any new issues. Patient denies fever/chills, nausea, vomiting, headache, chest pain, palpitations, dyspnea, diarrhea, abdominal pain.    12 Point ROS Negative unless noted otherwise above.  -------------------------------------------------------------------------------  VITAL SIGNS:  Vital Signs Last 24 Hrs  T(C): 36.4 (23 Feb 2023 05:12), Max: 37.3 (22 Feb 2023 10:33)  T(F): 97.6 (23 Feb 2023 05:12), Max: 99.1 (22 Feb 2023 10:33)  HR: 65 (23 Feb 2023 05:52) (60 - 82)  BP: 116/55 (23 Feb 2023 04:27) (89/52 - 116/55)  BP(mean): 79 (23 Feb 2023 04:27) (65 - 79)  RR: 18 (23 Feb 2023 04:27) (18 - 19)  SpO2: 93% (23 Feb 2023 05:52) (85% - 100%)    Parameters below as of 23 Feb 2023 05:52  Patient On (Oxygen Delivery Method): nasal cannula, high flow  O2 Flow (L/min): 60  O2 Concentration (%): 60  I&O's Summary    22 Feb 2023 07:01  -  23 Feb 2023 07:00  --------------------------------------------------------  IN: 51 mL / OUT: 1250 mL / NET: -1199 mL        PHYSICAL EXAM:    General: In bed in no acute distress on HFNC   HEENT: NCAT; PERRL, anicteric sclera; MMM  Neck: supple, trachea midline  Cardiovascular: S1, S2 normal; RRR, no M/G/R  Respiratory: decreased breath sounds at bases of lungs  Gastrointestinal: soft, nontender, nondistended. bowel sounds present.  Skin: no ulcerations or visible rashes appreciated  Extremities: WWP; no edema, clubbing or cyanosis  Vascular: 2+ radial, DP/PT pulses B/L  Neurological: AAOx3; CN II-XII grossly intact; no focal deficits    ALLERGIES:  Allergies    lanolin topical (Rash)    Intolerances        MEDICATIONS:  MEDICATIONS  (STANDING):  ARIPiprazole 2 milliGRAM(s) Oral every 24 hours  enoxaparin Injectable 85 milliGRAM(s) SubCutaneous every 12 hours  lamoTRIgine 100 milliGRAM(s) Oral every 24 hours  pantoprazole    Tablet 40 milliGRAM(s) Oral every 12 hours  PARoxetine 30 milliGRAM(s) Oral every 24 hours  piperacillin/tazobactam IVPB.. 4.5 Gram(s) IV Intermittent every 8 hours  sucralfate suspension 1 Gram(s) Oral every 6 hours    MEDICATIONS  (PRN):  acetaminophen     Tablet .. 650 milliGRAM(s) Oral every 6 hours PRN Temp greater or equal to 38C (100.4F)  ondansetron Injectable 4 milliGRAM(s) IV Push every 8 hours PRN Nausea and/or Vomiting      -------------------------------------------------------------------------------  LABS:                        8.3    9.33  )-----------( 284      ( 23 Feb 2023 05:30 )             27.3     02-23    135  |  97  |  4<L>  ----------------------------<  109<H>  3.8   |  32<H>  |  0.64    Ca    8.8      23 Feb 2023 05:30  Phos  3.0     02-23  Mg     1.9     02-23    TPro  4.7<L>  /  Alb  2.2<L>  /  TBili  <0.2  /  DBili  x   /  AST  18  /  ALT  42  /  AlkPhos  154<H>  02-23    LIVER FUNCTIONS - ( 23 Feb 2023 05:30 )  Alb: 2.2 g/dL / Pro: 4.7 g/dL / ALK PHOS: 154 U/L / ALT: 42 U/L / AST: 18 U/L / GGT: x           PT/INR - ( 21 Feb 2023 12:13 )   PT: 13.9 sec;   INR: 1.17          PTT - ( 23 Feb 2023 05:30 )  PTT:30.0 sec    CAPILLARY BLOOD GLUCOSE          Culture - Urine (collected 20 Feb 2023 14:18)  Source: Catheterized Catheterized  Final Report (22 Feb 2023 13:14):    10,000 CFU/ml Candida albicans    Culture - Sputum (collected 20 Feb 2023 14:18)  Source: .Sputum Sputum  Gram Stain (20 Feb 2023 22:18):    Moderate epithelial cells    Rare WBC's    Rare Yeast    Few Gram Positive Cocci in Clusters  Final Report (20 Feb 2023 22:18):    Sputum specimen rejected.  Microscopic examination indicates    oropharyngeal contamination.  Please repeat.      COVID-19 PCR: Negative (16 Feb 2023 10:56)      RADIOLOGY & ADDITIONAL TESTS: Reviewed.

## 2023-02-23 NOTE — PROGRESS NOTE ADULT - PROBLEM SELECTOR PLAN 3
The echocardiogram revealed right heart dilatation related to right ventricular strain from the pulmonary hypertension.  We will follow repeat echocardiogram next week

## 2023-02-23 NOTE — PROGRESS NOTE ADULT - PROBLEM SELECTOR PLAN 5
I decreased the FiO2 to 660% as the patient oxygen requirement increased for an unknown reason.  The patient was weaned down on oxygen in the high flow over the course of the day.  To continue to wean as tolerated maintain oxygen saturation above 92%

## 2023-02-23 NOTE — PROGRESS NOTE ADULT - PROBLEM SELECTOR PLAN 6
On home losartan 100 mg QD.  - hold i/s/o hypotension, active UGIB  - restart as clinically indicated On home losartan 100 mg QD.  - hold i/s/o normotension   - restart as clinically indicated

## 2023-02-23 NOTE — PROGRESS NOTE ADULT - SUBJECTIVE AND OBJECTIVE BOX
Interval Events: Reviewed  Patient seen and examined at bedside.    Patient is a 77y old  Female who presents with a chief complaint of Aspiration PNA, hematemesis w/ known esophageal ulcer (23 Feb 2023 07:50)    she is OK  PAST MEDICAL & SURGICAL HISTORY:  Hiatal hernia      High cholesterol      History of esophageal ulcer      HTN (hypertension)      Major depression      Seizure disorder      Anxiety      No significant past surgical history          MEDICATIONS:  Pulmonary:    Antimicrobials:  piperacillin/tazobactam IVPB.. 4.5 Gram(s) IV Intermittent every 8 hours    Anticoagulants:  enoxaparin Injectable 85 milliGRAM(s) SubCutaneous every 12 hours    Cardiac:      Allergies    lanolin topical (Rash)    Intolerances        Vital Signs Last 24 Hrs  T(C): 36.8 (23 Feb 2023 17:47), Max: 36.8 (23 Feb 2023 17:47)  T(F): 98.3 (23 Feb 2023 17:47), Max: 98.3 (23 Feb 2023 17:47)  HR: 70 (23 Feb 2023 18:15) (60 - 78)  BP: 124/56 (23 Feb 2023 16:00) (89/52 - 124/56)  BP(mean): 80 (23 Feb 2023 16:00) (65 - 80)  RR: 18 (23 Feb 2023 18:15) (18 - 19)  SpO2: 91% (23 Feb 2023 18:15) (85% - 100%)    Parameters below as of 23 Feb 2023 18:15  Patient On (Oxygen Delivery Method): nasal cannula, high flow  O2 Flow (L/min): 40  O2 Concentration (%): 50    02-22 @ 07:01 - 02-23 @ 07:00  --------------------------------------------------------  IN: 51 mL / OUT: 1250 mL / NET: -1199 mL    02-23 @ 07:01 - 02-23 @ 19:17  --------------------------------------------------------  IN: 0 mL / OUT: 350 mL / NET: -350 mL          Review of Systems:   •	General: negative  •	Skin/Breast: negative  •	Ophthalmologic: negative  •	ENMT: negative  •	Respiratory and Thorax: negative  •	Cardiovascular: negative  •	Gastrointestinal: negative  •	Genitourinary: negative  •	Musculoskeletal: negative  •	Neurological: negative  •	Psychiatric: negative  •	Hematology/Lymphatics: negative  •	Endocrine: negative  •	Allergic/Immunologic: negative    Physical Exam:   • Constitutional:	refer to the dietion /Nutritionist note  • Eyes:	EOMI; PERRL; no drainage or redness  • ENMT:	No oral lesions; no gross abnormalities  • Neck	No bruits; no thyromegaly or nodules  • Breasts:	not examined  • Back:	No deformity or limitation of movement  • Respiratory:	Breath Sounds equal & clear to percussion & auscultation, no accessory muscle use  • Cardiovascular:	Regular rate & rhythm, normal S1, S2; no murmurs, gallops or rubs; no S3, S4  • Gastrointestinal:	Soft, non-tender, no hepatosplenomegaly, normal bowel sounds  • Genitourinary:	not examined  • Rectal: not examined  • Extremities:	No cyanosis, clubbing or edema  • Vascular:	Equal and normal pulses (carotid, femoral, dorsalis pedis)  • Neurologica:l	not examined  • Skin:	No lesions; no rash  • Lymph Nodes:	No lymphadedenopathy  • Musculoskeletal:	No joint pain, swelling or deformity; no limitation of movement        LABS:      CBC Full  -  ( 23 Feb 2023 05:30 )  WBC Count : 9.33 K/uL  RBC Count : 2.79 M/uL  Hemoglobin : 8.3 g/dL  Hematocrit : 27.3 %  Platelet Count - Automated : 284 K/uL  Mean Cell Volume : 97.8 fl  Mean Cell Hemoglobin : 29.7 pg  Mean Cell Hemoglobin Concentration : 30.4 gm/dL  Auto Neutrophil # : 7.38 K/uL  Auto Lymphocyte # : 0.86 K/uL  Auto Monocyte # : 0.63 K/uL  Auto Eosinophil # : 0.29 K/uL  Auto Basophil # : 0.07 K/uL  Auto Neutrophil % : 79.0 %  Auto Lymphocyte % : 9.2 %  Auto Monocyte % : 6.8 %  Auto Eosinophil % : 3.1 %  Auto Basophil % : 0.8 %    02-23    135  |  97  |  4<L>  ----------------------------<  109<H>  3.8   |  32<H>  |  0.64    Ca    8.8      23 Feb 2023 05:30  Phos  3.0     02-23  Mg     1.9     02-23    TPro  4.7<L>  /  Alb  2.2<L>  /  TBili  <0.2  /  DBili  x   /  AST  18  /  ALT  42  /  AlkPhos  154<H>  02-23    PTT - ( 23 Feb 2023 05:30 )  PTT:30.0 sec                    RADIOLOGY & ADDITIONAL STUDIES (The following images were personally reviewed):

## 2023-02-23 NOTE — PROGRESS NOTE ADULT - PROBLEM SELECTOR PLAN 1
Patient with intermittent fevers and increasing O2 requirement with max settings on HFNC but good saturation   CTA Chest w/ numerous bilateral pulmonary emboli, saddle embolus on the right with extension into multiple upper and lower lobe pulmonary arteries. US Deep venous thrombosis in bilateral calf veins. Acute deep venous thrombosis: below the knee. TTE showing dilated right ventricular size. Reduced right ventricular systolic function. Dilated right atrium. PERT team following as well as structural heart  Plan:  - Started on lovenox on 2/22, heparin stopped

## 2023-02-23 NOTE — PROGRESS NOTE ADULT - PROBLEM SELECTOR PLAN 3
Pt w/ worsening cough and voice hoarseness i/s/o daily emesis, found to have mild leukocytosis, neutrophilic predominance, and lactate of 2.5 w/ left lobe infiltrate on CXR. Initially hypotensive on presentation to ED 80s/60s. now improved after 2.5 L NS. Likely aspiration PNA 2/2 intractable vomiting vs CAP. s/p 1 dose of unasyn in the ED, started on ceftx 2g q24 which was transitioned to Zosyn with pseudomonal coverage due to concern for aspiration pneumonia post endoscopy. Patient spiked fever 2/19 with increase O2 requirement to 50/100 weaned to 50/90. MRSA swab was negative. POCUS showing RV dilation. Patient was diuresed with 20 mg lasix   On 2/20 Increased O2 requirement at night requiring increasing the O2. WBC count wnl, Temp below Tmax in past 24 hrs. Currently on HFNC on 50/50. Continue management as below   Plan:  - C/w Zosyn 4.5 q4hrs (7 day course) - end 2/24  - Blood culture (2/19) NGTD and sputum culture (2/20 - collected)   - blood culture NGTD from 2/16  - Wean O2 as tolerated  - incentive spirometry  - Chest PT   - OOBTC

## 2023-02-23 NOTE — PROGRESS NOTE ADULT - PROBLEM SELECTOR PLAN 2
The patient is clinically stable.  Continue anticoagulation changed to Lovenox.  The oxygen requirement decreased.  No intervention at this point

## 2023-02-23 NOTE — PROGRESS NOTE ADULT - PROBLEM SELECTOR PLAN 2
Presented with UGIB w/ associated coffee-ground emesis. Pt w/ known hx of esophageal ulcer, diagnosed at [facility] in 1/2023, discharged on carafate and protonix with surgery f/u for hiatal hernia repair. Presented w/ 6 days of dark black emesis. No signs of active bleeding currently, Hgb stable. GI consulted, endoscopy done showing large sized paraesophageal hiatal hernia, Grade D esophagitis with no bleeding was seen starting at 30 cm from the incisors in the lower third of the esophagus and middle third of the esophagus, compatible with nonspecific erosive esophagitis. This is likely the source of coffee ground emesis. Patchy erythema of the mucosa with no bleeding was noted in the stomach body. These findings are compatible with non-erosive gastritis  Plan:  - Pantoprazole 40 mg PO BID x 2 weeks then daily  - Carafate Suspension 1g po qid  x 2 weeks

## 2023-02-24 LAB
ALBUMIN SERPL ELPH-MCNC: 2.6 G/DL — LOW (ref 3.3–5)
ALP SERPL-CCNC: 162 U/L — HIGH (ref 40–120)
ALT FLD-CCNC: 41 U/L — SIGNIFICANT CHANGE UP (ref 10–45)
ANION GAP SERPL CALC-SCNC: 8 MMOL/L — SIGNIFICANT CHANGE UP (ref 5–17)
AST SERPL-CCNC: 21 U/L — SIGNIFICANT CHANGE UP (ref 10–40)
BASOPHILS # BLD AUTO: 0.08 K/UL — SIGNIFICANT CHANGE UP (ref 0–0.2)
BASOPHILS NFR BLD AUTO: 1 % — SIGNIFICANT CHANGE UP (ref 0–2)
BILIRUB SERPL-MCNC: <0.2 MG/DL — SIGNIFICANT CHANGE UP (ref 0.2–1.2)
BUN SERPL-MCNC: 4 MG/DL — LOW (ref 7–23)
CALCIUM SERPL-MCNC: 9.2 MG/DL — SIGNIFICANT CHANGE UP (ref 8.4–10.5)
CHLORIDE SERPL-SCNC: 97 MMOL/L — SIGNIFICANT CHANGE UP (ref 96–108)
CO2 SERPL-SCNC: 33 MMOL/L — HIGH (ref 22–31)
CREAT SERPL-MCNC: 0.73 MG/DL — SIGNIFICANT CHANGE UP (ref 0.5–1.3)
CULTURE RESULTS: SIGNIFICANT CHANGE UP
EGFR: 84 ML/MIN/1.73M2 — SIGNIFICANT CHANGE UP
EOSINOPHIL # BLD AUTO: 0.24 K/UL — SIGNIFICANT CHANGE UP (ref 0–0.5)
EOSINOPHIL NFR BLD AUTO: 3.1 % — SIGNIFICANT CHANGE UP (ref 0–6)
GLUCOSE SERPL-MCNC: 106 MG/DL — HIGH (ref 70–99)
HCT VFR BLD CALC: 26.9 % — LOW (ref 34.5–45)
HGB BLD-MCNC: 8.2 G/DL — LOW (ref 11.5–15.5)
IMM GRANULOCYTES NFR BLD AUTO: 0.6 % — SIGNIFICANT CHANGE UP (ref 0–0.9)
LYMPHOCYTES # BLD AUTO: 0.97 K/UL — LOW (ref 1–3.3)
LYMPHOCYTES # BLD AUTO: 12.3 % — LOW (ref 13–44)
MAGNESIUM SERPL-MCNC: 1.9 MG/DL — SIGNIFICANT CHANGE UP (ref 1.6–2.6)
MCHC RBC-ENTMCNC: 29.2 PG — SIGNIFICANT CHANGE UP (ref 27–34)
MCHC RBC-ENTMCNC: 30.5 GM/DL — LOW (ref 32–36)
MCV RBC AUTO: 95.7 FL — SIGNIFICANT CHANGE UP (ref 80–100)
MONOCYTES # BLD AUTO: 0.46 K/UL — SIGNIFICANT CHANGE UP (ref 0–0.9)
MONOCYTES NFR BLD AUTO: 5.9 % — SIGNIFICANT CHANGE UP (ref 2–14)
NEUTROPHILS # BLD AUTO: 6.06 K/UL — SIGNIFICANT CHANGE UP (ref 1.8–7.4)
NEUTROPHILS NFR BLD AUTO: 77.1 % — HIGH (ref 43–77)
NRBC # BLD: 0 /100 WBCS — SIGNIFICANT CHANGE UP (ref 0–0)
PHOSPHATE SERPL-MCNC: 2.7 MG/DL — SIGNIFICANT CHANGE UP (ref 2.5–4.5)
PLATELET # BLD AUTO: 335 K/UL — SIGNIFICANT CHANGE UP (ref 150–400)
POTASSIUM SERPL-MCNC: 3.7 MMOL/L — SIGNIFICANT CHANGE UP (ref 3.5–5.3)
POTASSIUM SERPL-SCNC: 3.7 MMOL/L — SIGNIFICANT CHANGE UP (ref 3.5–5.3)
PROT SERPL-MCNC: 5.4 G/DL — LOW (ref 6–8.3)
RBC # BLD: 2.81 M/UL — LOW (ref 3.8–5.2)
RBC # FLD: 15.4 % — HIGH (ref 10.3–14.5)
SODIUM SERPL-SCNC: 138 MMOL/L — SIGNIFICANT CHANGE UP (ref 135–145)
SPECIMEN SOURCE: SIGNIFICANT CHANGE UP
WBC # BLD: 7.86 K/UL — SIGNIFICANT CHANGE UP (ref 3.8–10.5)
WBC # FLD AUTO: 7.86 K/UL — SIGNIFICANT CHANGE UP (ref 3.8–10.5)

## 2023-02-24 PROCEDURE — 99233 SBSQ HOSP IP/OBS HIGH 50: CPT | Mod: GC

## 2023-02-24 PROCEDURE — 71045 X-RAY EXAM CHEST 1 VIEW: CPT | Mod: 26

## 2023-02-24 RX ADMIN — Medication 1 GRAM(S): at 08:00

## 2023-02-24 RX ADMIN — Medication 1 GRAM(S): at 02:37

## 2023-02-24 RX ADMIN — PIPERACILLIN AND TAZOBACTAM 25 GRAM(S): 4; .5 INJECTION, POWDER, LYOPHILIZED, FOR SOLUTION INTRAVENOUS at 14:58

## 2023-02-24 RX ADMIN — ARIPIPRAZOLE 2 MILLIGRAM(S): 15 TABLET ORAL at 15:49

## 2023-02-24 RX ADMIN — LAMOTRIGINE 100 MILLIGRAM(S): 25 TABLET, ORALLY DISINTEGRATING ORAL at 11:37

## 2023-02-24 RX ADMIN — Medication 1 GRAM(S): at 14:57

## 2023-02-24 RX ADMIN — Medication 30 MILLIGRAM(S): at 11:37

## 2023-02-24 RX ADMIN — PANTOPRAZOLE SODIUM 40 MILLIGRAM(S): 20 TABLET, DELAYED RELEASE ORAL at 17:34

## 2023-02-24 RX ADMIN — ENOXAPARIN SODIUM 85 MILLIGRAM(S): 100 INJECTION SUBCUTANEOUS at 11:37

## 2023-02-24 RX ADMIN — PANTOPRAZOLE SODIUM 40 MILLIGRAM(S): 20 TABLET, DELAYED RELEASE ORAL at 06:16

## 2023-02-24 RX ADMIN — Medication 1 GRAM(S): at 19:11

## 2023-02-24 RX ADMIN — PIPERACILLIN AND TAZOBACTAM 25 GRAM(S): 4; .5 INJECTION, POWDER, LYOPHILIZED, FOR SOLUTION INTRAVENOUS at 06:02

## 2023-02-24 RX ADMIN — PIPERACILLIN AND TAZOBACTAM 25 GRAM(S): 4; .5 INJECTION, POWDER, LYOPHILIZED, FOR SOLUTION INTRAVENOUS at 21:36

## 2023-02-24 NOTE — PROGRESS NOTE ADULT - PROBLEM SELECTOR PLAN 5
On home abilify 2 mg QD, paxil 40 qd, and lorazepam 1g PRN.  Plan:  - C/w abilify and paxil  - Hold lorazepam

## 2023-02-24 NOTE — PROGRESS NOTE ADULT - PROBLEM SELECTOR PLAN 1
Patient with intermittent fevers and increasing O2 requirement with max settings on HFNC but good saturation   CTA Chest w/ numerous bilateral pulmonary emboli, saddle embolus on the right with extension into multiple upper and lower lobe pulmonary arteries. US Deep venous thrombosis in bilateral calf veins. Acute deep venous thrombosis: below the knee. TTE showing dilated right ventricular size. Reduced right ventricular systolic function. Dilated right atrium. PERT team following as well as structural heart  Plan:  - c/w lovenox

## 2023-02-24 NOTE — PROGRESS NOTE ADULT - PROBLEM SELECTOR PLAN 4
MCV 94 2/2 to active hematemesis i/s/o known esophageal ulcer. Iron 20, Ferritin 154, total binding capacity 164, %sat 12. Likely 2/2 mixed picture of iron deficiency w/ possible concurrent anemia of chronic disease. Hgb remains stable, 8.2 today.  Plan:  - Trend CBC  - Maintain active T&S (order for 2/18)  - transfuse if Hgb <7

## 2023-02-24 NOTE — PROGRESS NOTE ADULT - PROBLEM SELECTOR PLAN 2
Presented with UGIB w/ associated coffee-ground emesis. Pt w/ known hx of esophageal ulcer, diagnosed at [facility] in 1/2023, discharged on carafate and protonix with surgery f/u for hiatal hernia repair. Presented w/ 6 days of dark black emesis. No signs of active bleeding currently, Hgb stable. GI consulted, endoscopy done showing large sized paraesophageal hiatal hernia, Grade D esophagitis with no bleeding was seen starting at 30 cm from the incisors in the lower third of the esophagus and middle third of the esophagus, compatible with nonspecific erosive esophagitis. This is likely the source of coffee ground emesis. Patchy erythema of the mucosa with no bleeding was noted in the stomach body. These findings are compatible with non-erosive gastritis  Plan:  - Pantoprazole 40 mg PO BID x 2 weeks then daily  - Carafate Suspension 1g po qid  x 2 weeks Odomzo Counseling- I discussed with the patient the risks of Odomzo including but not limited to nausea, vomiting, diarrhea, constipation, weight loss, changes in the sense of taste, decreased appetite, muscle spasms, and hair loss.  The patient verbalized understanding of the proper use and possible adverse effects of Odomzo.  All of the patient's questions and concerns were addressed.

## 2023-02-24 NOTE — PROGRESS NOTE ADULT - ASSESSMENT
per Internal Medicine    77 y o F w/ hx of hiatal hernia and esophageal ulcer, presenting with 6 days of dark black emesis, hoarseness, and anorexia, admitted for UGIB and aspiration PNA. Patient has been on zosyn for 3 days for aspitation PNA treatment however has continued to spike fevers currently undergoing further work up     Problem/Plan - 1:  ·  Problem: Pulmonary embolism.   ·  Plan: Patient with intermittent fevers and increasing O2 requirement with max settings on HFNC but good saturation   CTA Chest w/ numerous bilateral pulmonary emboli, saddle embolus on the right with extension into multiple upper and lower lobe pulmonary arteries. US Deep venous thrombosis in bilateral calf veins. Acute deep venous thrombosis: below the knee. TTE showing dilated right ventricular size. Reduced right ventricular systolic function. Dilated right atrium. PERT team following as well as structural heart  Plan:  - c/w lovenox.    Problem/Plan - 2:  ·  Problem: Esophageal ulcer.   ·  Plan: Presented with UGIB w/ associated coffee-ground emesis. Pt w/ known hx of esophageal ulcer, diagnosed at [facility] in 1/2023, discharged on carafate and protonix with surgery f/u for hiatal hernia repair. Presented w/ 6 days of dark black emesis. No signs of active bleeding currently, Hgb stable. GI consulted, endoscopy done showing large sized paraesophageal hiatal hernia, Grade D esophagitis with no bleeding was seen starting at 30 cm from the incisors in the lower third of the esophagus and middle third of the esophagus, compatible with nonspecific erosive esophagitis. This is likely the source of coffee ground emesis. Patchy erythema of the mucosa with no bleeding was noted in the stomach body. These findings are compatible with non-erosive gastritis  Plan:  - Pantoprazole 40 mg PO BID x 2 weeks then daily  - Carafate Suspension 1g po qid  x 2 weeks.    Problem/Plan - 3:  ·  Problem: Aspiration pneumonia of left lung.   ·  Plan: Pt w/ worsening cough and voice hoarseness i/s/o daily emesis, found to have mild leukocytosis, neutrophilic predominance, and lactate of 2.5 w/ left lobe infiltrate on CXR. Initially hypotensive on presentation to ED 80s/60s. now improved after 2.5 L NS. Likely aspiration PNA 2/2 intractable vomiting vs CAP. s/p 1 dose of unasyn in the ED, started on ceftx 2g q24 which was transitioned to Zosyn with pseudomonal coverage due to concern for aspiration pneumonia post endoscopy. Patient spiked fever 2/19 with increase O2 requirement to 50/100 weaned to 50/90. MRSA swab was negative. POCUS showing RV dilation. Patient was diuresed with 20 mg lasix   On 2/20 Increased O2 requirement at night requiring increasing the O2. WBC count wnl, Temp below Tmax in past 24 hrs. Currently on HFNC on 50/50. Continue management as below   Plan:  - C/w Zosyn 4.5 q4hrs (7 day course) - end 2/24  - Blood culture (2/19) NGTD and sputum culture (2/20 - collected)   - blood culture NGTD from 2/16  - Wean O2 as tolerated  - incentive spirometry  - Chest PT   - OOBTC.    Problem/Plan - 4:  ·  Problem: Anemia.   ·  Plan: MCV 94 2/2 to active hematemesis i/s/o known esophageal ulcer. Iron 20, Ferritin 154, total binding capacity 164, %sat 12. Likely 2/2 mixed picture of iron deficiency w/ possible concurrent anemia of chronic disease. Hgb remains stable, 8.2 today.  Plan:  - Trend CBC  - Maintain active T&S (order for 2/18)  - transfuse if Hgb <7.    Problem/Plan - 5:  ·  Problem: Anxiety and depression.   ·  Plan: On home abilify 2 mg QD, paxil 40 qd, and lorazepam 1g PRN.  Plan:  - C/w abilify and paxil  - Hold lorazepam.    Problem/Plan - 6:  ·  Problem: HTN (hypertension).   ·  Plan: On home losartan 100 mg QD.  Plan:  - hold i/s/o normotension   - restart as clinically indicated.    Problem/Plan - 7:  ·  Problem: HLD (hyperlipidemia).   ·  Plan: On home atorvastatin 20 mg  - c/w home meds.    Problem/Plan - 8:  ·  Problem: Prophylactic measure.   ·  Plan: F: none  E: replete K<4, Mg<2  N: regular  VTE: lovenox  GI: protonix ggt  C: DNR/DNI (new molst in chart)  D: RMF.

## 2023-02-24 NOTE — PROGRESS NOTE ADULT - SUBJECTIVE AND OBJECTIVE BOX
Physical Medicine and Rehabilitation Progress Note :       Patient is a 77y old  Female who presents with a chief complaint of Aspiration PNA, hematemesis w/ known esophageal ulcer (24 Feb 2023 08:23)      HPI:  76 y/o F PMH of hiatal hernia, esophageal ulcer (diagnosed at [facility] in 1/2023), HLD, depression (on aripiprazole and paroxetine), and seizure disorder (last seizure 2 years ago, on lamotrigine), p/w 6 days of dark black emesis after drinking a glass of wine last Friday. Pt was recently evaluated at [facility] for similar symptoms, found to have an esophageal ulcer on EGD, and was discharged on carafate and protonix.  She has since had daily episodes of dark black emesis, dry cough, and hoarseness of her voice. She also c/o decreased appetite and general malaise. She denies any abdominal pain, shortness of breath, diarrhea, chest pain, palpitations, headaches, dizziness, dysuria, constipation, melena, or hematochezia.     In the ED:  Initial vital signs: T: 96.9 F, HR: 84, BP: 87/62--> 110/56 s/p, R: 18, SpO2: 93% on RA--> 100% on 3L  Labs: WBC 11.43, Hgb 9.0, MCV 94.7 Neutrophils 10.33, lactate 2.5-->0.9, K 3.3, BUN 44, Cr 1.77, pH 7.28  CXR: Left lung consolidation  Medications: unasyn x1, protonix IV x1, 2.5 L NS     (16 Feb 2023 17:07)                            8.2    7.86  )-----------( 335      ( 24 Feb 2023 06:58 )             26.9       02-24    138  |  97  |  4<L>  ----------------------------<  106<H>  3.7   |  33<H>  |  0.73    Ca    9.2      24 Feb 2023 06:58  Phos  2.7     02-24  Mg     1.9     02-24    TPro  5.4<L>  /  Alb  2.6<L>  /  TBili  <0.2  /  DBili  x   /  AST  21  /  ALT  41  /  AlkPhos  162<H>  02-24    Vital Signs Last 24 Hrs  T(C): 37.1 (24 Feb 2023 10:03), Max: 37.1 (24 Feb 2023 10:03)  T(F): 98.8 (24 Feb 2023 10:03), Max: 98.8 (24 Feb 2023 10:03)  HR: 68 (24 Feb 2023 09:21) (16 - 78)  BP: 127/62 (24 Feb 2023 09:21) (106/55 - 144/67)  BP(mean): 85 (24 Feb 2023 09:21) (75 - 96)  RR: 18 (24 Feb 2023 08:30) (18 - 20)  SpO2: 89% (24 Feb 2023 09:21) (80% - 97%)    Parameters below as of 24 Feb 2023 08:30  Patient On (Oxygen Delivery Method): nasal cannula, high flow  O2 Flow (L/min): 50  O2 Concentration (%): 65    MEDICATIONS  (STANDING):  ARIPiprazole 2 milliGRAM(s) Oral every 24 hours  enoxaparin Injectable 85 milliGRAM(s) SubCutaneous every 12 hours  lamoTRIgine 100 milliGRAM(s) Oral every 24 hours  pantoprazole    Tablet 40 milliGRAM(s) Oral every 12 hours  PARoxetine 30 milliGRAM(s) Oral every 24 hours  piperacillin/tazobactam IVPB.. 4.5 Gram(s) IV Intermittent every 8 hours  sucralfate suspension 1 Gram(s) Oral every 6 hours    MEDICATIONS  (PRN):  acetaminophen     Tablet .. 650 milliGRAM(s) Oral every 6 hours PRN Temp greater or equal to 38C (100.4F)  ondansetron Injectable 4 milliGRAM(s) IV Push every 8 hours PRN Nausea and/or Vomiting      Physical Therapy Functional Status Assessment :       Safety      AM-PAC Functional Assessment: Basic Mobility  Type of Assessment: Daily assessment  Turning from your back to your side while in a flat bed without using bedrails?: 3 = A little assistance  Moving from lying on your back to sitting on the flat side of a flat bed without using bedrails?: 3 = A little assistance  Moving to and from a bed to a chair (including a wheelchair)?: 2 = A lot of assistance  Standing up from a chair using your arms (e.g. wheelchair or bedside chair)?: 2 = A lot of assistance  Walking in hospital room?: 2 = A lot of assistance  Climbing 3-5 steps with a railing?: 2-calculated by average   Score: 14   Row Comment: Ask the patient "How much help from another person do you currently need? (If the patient hasn't done an activity recently, how much help from another person do you think he/she needs if he/she tried?)    Cognitive/Neuro      Cognitive/Neuro/Behavioral  Cognitive/Neuro/Behavioral [WDL Definition: Alert; opens eyes spontaneously; arouses to voice or touch; oriented x 4; follows commands; speech spontaneous, logical; purposeful motor response; behavior appropriate to situation]: WDL  Level of Consciousness: alert  Arousal Level: arouses to voice  Orientation: oriented x 4  Speech: hypophonic  Mood/Behavior: calm;  cooperative    Language Assistance  Preferred Language to Address Healthcare Preferred Language to Address Healthcare: English    Therapeutic Interventions      Bed Mobility  Bed Mobility Training Sit-to-Supine: contact guard;  verbal cues;  1 person assist  Bed Mobility Training Supine-to-Sit: contact guard;  verbal cues;  1 person assist  Bed Mobility Training Limitations: decreased strength    Sit-Stand Transfer Training  Transfer Training Sit-to-Stand Transfer: minimum assist (75% patient effort);  verbal cues;  1 person assist;  full weight-bearing   hand held assist  Transfer Training Stand-to-Sit Transfer: minimum assist (75% patient effort);  verbal cues;  1 person assist;  full weight-bearing   hand held assist   Sit-to-Stand Transfer Training Transfer Safety Analysis: decreased balance;  decreased strength;  impaired balance;  impaired postural control;  hand held assist     Gait Training  Gait Training: minimum assist (75% patient effort);  verbal cues;  1 person assist;  full weight-bearing   hand held assist ;  6 side steps each direction  Gait Analysis: impaired balance;  decreased strength;  impaired postural control;  6 side steps each direction ;  hand held assist     Therapeutic Exercise  Therapeutic Exercise Detail: AROM b/l knee ext x10 // seated marching x10 // isometric trunk control (flex, ext, b/l sidebending) x10 seconds         PM&R Impression : as above    Current Disposition Plan Recommendations :    subacute rehab placement

## 2023-02-24 NOTE — PROGRESS NOTE ADULT - PROBLEM SELECTOR PLAN 8
F: none  E: replete K<4, Mg<2  N: NPO at midnight   VTE Prophylaxis: hold i/s/o active UGIB  GI: protonix ggt  C: DNR/DNI (new molst in chart)  D: RMF F: none  E: replete K<4, Mg<2  N: regular  VTE: lovenox  GI: protonix ggt  C: DNR/DNI (new molst in chart)  D: RMF

## 2023-02-24 NOTE — PROGRESS NOTE ADULT - SUBJECTIVE AND OBJECTIVE BOX
NATHEN CHOW, 77y, Female  MRN-[id number]  Patient is a 77y old  Female who presents with a chief complaint of Aspiration PNA, hematemesis w/ known esophageal ulcer (24 Feb 2023 08:23)      OVERNIGHT EVENTS:     SUBJECTIVE:    12 Point ROS Negative unless noted otherwise above.  -------------------------------------------------------------------------------  VITAL SIGNS:  Vital Signs Last 24 Hrs  T(C): 36.9 (24 Feb 2023 06:54), Max: 36.9 (24 Feb 2023 06:54)  T(F): 98.4 (24 Feb 2023 06:54), Max: 98.4 (24 Feb 2023 06:54)  HR: 71 (24 Feb 2023 05:30) (16 - 78)  BP: 111/54 (24 Feb 2023 03:53) (91/49 - 124/56)  BP(mean): 78 (24 Feb 2023 03:53) (66 - 80)  RR: 18 (24 Feb 2023 05:30) (18 - 20)  SpO2: 95% (24 Feb 2023 05:30) (89% - 97%)    Parameters below as of 24 Feb 2023 05:30  Patient On (Oxygen Delivery Method): nasal cannula, high flow  O2 Flow (L/min): 50  O2 Concentration (%): 65  I&O's Summary    23 Feb 2023 07:01  -  24 Feb 2023 07:00  --------------------------------------------------------  IN: 300 mL / OUT: 1050 mL / NET: -750 mL        PHYSICAL EXAM:    General: NAD, well developed  HEENT: NC/AT; EOMI, PERRLA, anicteric sclera; moist mucosal membranes.  Neck: supple, trachea midline  Cardiovascular: RRR, +S1/S2; NO M/R/G  Respiratory: CTA B/L; no W/R/R  Gastrointestinal: soft, NT/ND; +BSx4  Extremities: WWP; no edema or cyanosis  Vascular: 2+ radial, DP/PT pulses B/L  Neurological: AAOx3; no focal deficits    ALLERGIES:  Allergies    lanolin topical (Rash)    Intolerances        MEDICATIONS:  MEDICATIONS  (STANDING):  ARIPiprazole 2 milliGRAM(s) Oral every 24 hours  enoxaparin Injectable 85 milliGRAM(s) SubCutaneous every 12 hours  lamoTRIgine 100 milliGRAM(s) Oral every 24 hours  pantoprazole    Tablet 40 milliGRAM(s) Oral every 12 hours  PARoxetine 30 milliGRAM(s) Oral every 24 hours  piperacillin/tazobactam IVPB.. 4.5 Gram(s) IV Intermittent every 8 hours  sucralfate suspension 1 Gram(s) Oral every 6 hours    MEDICATIONS  (PRN):  acetaminophen     Tablet .. 650 milliGRAM(s) Oral every 6 hours PRN Temp greater or equal to 38C (100.4F)  ondansetron Injectable 4 milliGRAM(s) IV Push every 8 hours PRN Nausea and/or Vomiting      -------------------------------------------------------------------------------  LABS:                        8.2    7.86  )-----------( 335      ( 24 Feb 2023 06:58 )             26.9     02-24    138  |  97  |  4<L>  ----------------------------<  106<H>  3.7   |  33<H>  |  0.73    Ca    9.2      24 Feb 2023 06:58  Phos  2.7     02-24  Mg     1.9     02-24    TPro  5.4<L>  /  Alb  2.6<L>  /  TBili  <0.2  /  DBili  x   /  AST  21  /  ALT  41  /  AlkPhos  162<H>  02-24    LIVER FUNCTIONS - ( 24 Feb 2023 06:58 )  Alb: 2.6 g/dL / Pro: 5.4 g/dL / ALK PHOS: 162 U/L / ALT: 41 U/L / AST: 21 U/L / GGT: x           PTT - ( 23 Feb 2023 05:30 )  PTT:30.0 sec    CAPILLARY BLOOD GLUCOSE          COVID-19 PCR: Negative (16 Feb 2023 10:56)      RADIOLOGY & ADDITIONAL TESTS: Reviewed.       NATHEN CHOW, 77y, Female  MRN-[id number]  Patient is a 77y old  Female who presents with a chief complaint of Aspiration PNA, hematemesis w/ known esophageal ulcer (24 Feb 2023 08:23)      OVERNIGHT EVENTS: Pt had periods of desat to 80s, HFNC settings increased to 50/60 with improvement in respiratory status    SUBJECTIVE: Pt assessed at bedside, denies any new complaints. States she continues to have non bloody bowel movements but feels they are getting looser, not diarrhea though. Patient denies fever/chills, nausea, vomiting, headache, chest pain, palpitations, dyspnea, abdominal pain.     12 Point ROS Negative unless noted otherwise above.  -------------------------------------------------------------------------------  VITAL SIGNS:  Vital Signs Last 24 Hrs  T(C): 36.9 (24 Feb 2023 06:54), Max: 36.9 (24 Feb 2023 06:54)  T(F): 98.4 (24 Feb 2023 06:54), Max: 98.4 (24 Feb 2023 06:54)  HR: 71 (24 Feb 2023 05:30) (16 - 78)  BP: 111/54 (24 Feb 2023 03:53) (91/49 - 124/56)  BP(mean): 78 (24 Feb 2023 03:53) (66 - 80)  RR: 18 (24 Feb 2023 05:30) (18 - 20)  SpO2: 95% (24 Feb 2023 05:30) (89% - 97%)    Parameters below as of 24 Feb 2023 05:30  Patient On (Oxygen Delivery Method): nasal cannula, high flow  O2 Flow (L/min): 50  O2 Concentration (%): 65  I&O's Summary    23 Feb 2023 07:01  -  24 Feb 2023 07:00  --------------------------------------------------------  IN: 300 mL / OUT: 1050 mL / NET: -750 mL        PHYSICAL EXAM:    General: In bed in no acute distress on HFNC   HEENT: NCAT; PERRL, anicteric sclera; MMM  Neck: supple, trachea midline  Cardiovascular: S1, S2 normal; RRR, no M/G/R  Respiratory: CTA, no wheezing or crackles   Gastrointestinal: soft, nontender, nondistended. bowel sounds present.  Skin: no ulcerations or visible rashes appreciated  Extremities: WWP; no edema, clubbing or cyanosis  Vascular: 2+ radial, DP/PT pulses B/L  Neurological: AAOx3; CN II-XII grossly intact; no focal deficits    ALLERGIES:  Allergies    lanolin topical (Rash)    Intolerances        MEDICATIONS:  MEDICATIONS  (STANDING):  ARIPiprazole 2 milliGRAM(s) Oral every 24 hours  enoxaparin Injectable 85 milliGRAM(s) SubCutaneous every 12 hours  lamoTRIgine 100 milliGRAM(s) Oral every 24 hours  pantoprazole    Tablet 40 milliGRAM(s) Oral every 12 hours  PARoxetine 30 milliGRAM(s) Oral every 24 hours  piperacillin/tazobactam IVPB.. 4.5 Gram(s) IV Intermittent every 8 hours  sucralfate suspension 1 Gram(s) Oral every 6 hours    MEDICATIONS  (PRN):  acetaminophen     Tablet .. 650 milliGRAM(s) Oral every 6 hours PRN Temp greater or equal to 38C (100.4F)  ondansetron Injectable 4 milliGRAM(s) IV Push every 8 hours PRN Nausea and/or Vomiting      -------------------------------------------------------------------------------  LABS:                        8.2    7.86  )-----------( 335      ( 24 Feb 2023 06:58 )             26.9     02-24    138  |  97  |  4<L>  ----------------------------<  106<H>  3.7   |  33<H>  |  0.73    Ca    9.2      24 Feb 2023 06:58  Phos  2.7     02-24  Mg     1.9     02-24    TPro  5.4<L>  /  Alb  2.6<L>  /  TBili  <0.2  /  DBili  x   /  AST  21  /  ALT  41  /  AlkPhos  162<H>  02-24    LIVER FUNCTIONS - ( 24 Feb 2023 06:58 )  Alb: 2.6 g/dL / Pro: 5.4 g/dL / ALK PHOS: 162 U/L / ALT: 41 U/L / AST: 21 U/L / GGT: x           PTT - ( 23 Feb 2023 05:30 )  PTT:30.0 sec    CAPILLARY BLOOD GLUCOSE          COVID-19 PCR: Negative (16 Feb 2023 10:56)      RADIOLOGY & ADDITIONAL TESTS: Reviewed.

## 2023-02-24 NOTE — CHART NOTE - NSCHARTNOTEFT_GEN_A_CORE
Gastric biopsies final pathology from EGD on 2/17 unremarkable. No H. pylori identified.    Recommendations:  -Continue PPI BID and carafate 1 gm QID    Please request that the patient schedule GI follow-up at the clinic located on the fourth floor of [facility] [address] by calling the office at [phone]  or she may choose to follow up with her GI physicians at [facility]    GI svc remains available as needed    Bryson Serrano DO  Gastroenterology Fellow  Pager: [phone]

## 2023-02-24 NOTE — CHART NOTE - NSCHARTNOTEFT_GEN_A_CORE
Admitting Diagnosis:   Patient is a 77y old  Female who presents with a chief complaint of Aspiration PNA, hematemesis w/ known esophageal ulcer (24 Feb 2023 08:23)      PAST MEDICAL & SURGICAL HISTORY:  Hiatal hernia      High cholesterol      History of esophageal ulcer      HTN (hypertension)      Major depression      Seizure disorder      Anxiety      No significant past surgical history    Current Nutrition Order: Regular      PO Intake: Good (%) [ x ]  Fair (50-75%) [   ] Poor (<25%) [   ]    GI Issues: Abdomen ND/NT, +BS x4, LBM 2/24    Pain: 0 per chart review     Skin Integrity: WDI, no edema noted     Labs:   02-24    138  |  97  |  4<L>  ----------------------------<  106<H>  3.7   |  33<H>  |  0.73    Ca    9.2      24 Feb 2023 06:58  Phos  2.7     02-24  Mg     1.9     02-24    TPro  5.4<L>  /  Alb  2.6<L>  /  TBili  <0.2  /  DBili  x   /  AST  21  /  ALT  41  /  AlkPhos  162<H>  02-24    CAPILLARY BLOOD GLUCOSE    Medications:  MEDICATIONS  (STANDING):  ARIPiprazole 2 milliGRAM(s) Oral every 24 hours  enoxaparin Injectable 85 milliGRAM(s) SubCutaneous every 12 hours  lamoTRIgine 100 milliGRAM(s) Oral every 24 hours  pantoprazole    Tablet 40 milliGRAM(s) Oral every 12 hours  PARoxetine 30 milliGRAM(s) Oral every 24 hours  piperacillin/tazobactam IVPB.. 4.5 Gram(s) IV Intermittent every 8 hours  sucralfate suspension 1 Gram(s) Oral every 6 hours    MEDICATIONS  (PRN):  acetaminophen     Tablet .. 650 milliGRAM(s) Oral every 6 hours PRN Temp greater or equal to 38C (100.4F)  ondansetron Injectable 4 milliGRAM(s) IV Push every 8 hours PRN Nausea and/or Vomiting    Height for BMI (CENTIMETERS)	165.1 Centimeter(s)  Weight for BMI (lbs)	183.4 lb  Weight for BMI (kg)	83.2 kg  Body Mass Index	30.5    Weight Change: No new wt since admit.     Estimated energy needs:   Weight used for calculations	IBW  Estimated Energy Needs Weight (lbs)	125.2 lb  Estimated Energy Needs Weight (kg)	56.8 kg  Estimated Energy Needs From (jaci/kg)	25  Estimated Energy Needs To (jaci/kg)	30  Estimated Energy Needs Calculated From (jaci/kg)	1420  Estimated Energy Needs Calculated To (jaci/kg)	1704  Weight used for calculations	IBW  Estimated Protein Needs Weight (lbs)	125.2 lb  Estimated Protein Needs Weight (kg)	56.8 kg  Estimated Protein Needs From (g/kg)	1  Estimated Protein Needs To (g/kg)	1.2  Estimated Protein Needs Calculated From (g/kg)	56.8  Estimated Protein Needs Calculated To (g/kg)	68.16  Estimated Fluid Needs Weight (lbs)	125.2 lb  Estimated Fluid Needs Weight (kg)	56.8 kg  Estimated Fluid Needs From (ml/kg)	25  Estimated Fluid Needs To (ml/kg)	30  Estimated Fluid Needs Calculated From (ml/kg)	1420  Estimated Fluid Needs Calculated To (ml/kg)	1704  Other Calculations	IBW used to calculate needs due to pt's current body weight exceeding 120% of IBW adjusted for maintenance with increased protein for consideration of decreased intake x6 days reported.    Subjective: 78 y/o F w/ hx of hiatal hernia and esophageal ulcer, presenting with 6 days of dark black emesis, hoarseness, and anorexia, admitted for UGIB and aspiration PNA.     Pt assessed at bedside with daughter. Rx and labs reviewed. Pt with reportedly good appetite today; meal tray at bedside >75% consumed. Daughter brought in bagel with cream cheese, red onion, and other toppings; ate 100%. Medically, pt is followed by PERT team; monitor for interventions. HFNC to 50/50; desat to 85 o/n. GI bx returned negative for H. pylori. No new c/o NVCD or difficult chew/swallow. RDN will continue to reassess, intervene, and monitor as appropriate.     Previous Nutrition Diagnosis: Inadequate Oral Intake r/t pt condition AEB decreased oral intake unlikely to meet nutritional demands    Active [   ]  Resolved [ x ]    If resolved, new PES: No new nutritional dx at this time.     Goal: Pt will meet 75% or more of protein/energy needs via most appropriate route for nutrition.     Recommendations:  -Continue current diet   -Encourage continued good PO intake   -Honor food preferences as able   -Monitor chemistry, GI fxn, and skin integrity     Risk Level: High [   ] Moderate [ x ] Low [   ]

## 2023-02-25 LAB
ALBUMIN SERPL ELPH-MCNC: 2.3 G/DL — LOW (ref 3.3–5)
ALP SERPL-CCNC: 143 U/L — HIGH (ref 40–120)
ALT FLD-CCNC: 36 U/L — SIGNIFICANT CHANGE UP (ref 10–45)
ANION GAP SERPL CALC-SCNC: 6 MMOL/L — SIGNIFICANT CHANGE UP (ref 5–17)
AST SERPL-CCNC: 26 U/L — SIGNIFICANT CHANGE UP (ref 10–40)
BASOPHILS # BLD AUTO: 0.06 K/UL — SIGNIFICANT CHANGE UP (ref 0–0.2)
BASOPHILS NFR BLD AUTO: 0.8 % — SIGNIFICANT CHANGE UP (ref 0–2)
BILIRUB SERPL-MCNC: <0.2 MG/DL — SIGNIFICANT CHANGE UP (ref 0.2–1.2)
BUN SERPL-MCNC: 8 MG/DL — SIGNIFICANT CHANGE UP (ref 7–23)
CALCIUM SERPL-MCNC: 8.9 MG/DL — SIGNIFICANT CHANGE UP (ref 8.4–10.5)
CHLORIDE SERPL-SCNC: 99 MMOL/L — SIGNIFICANT CHANGE UP (ref 96–108)
CO2 SERPL-SCNC: 31 MMOL/L — SIGNIFICANT CHANGE UP (ref 22–31)
CREAT SERPL-MCNC: 0.67 MG/DL — SIGNIFICANT CHANGE UP (ref 0.5–1.3)
EGFR: 89 ML/MIN/1.73M2 — SIGNIFICANT CHANGE UP
EOSINOPHIL # BLD AUTO: 0.28 K/UL — SIGNIFICANT CHANGE UP (ref 0–0.5)
EOSINOPHIL NFR BLD AUTO: 3.8 % — SIGNIFICANT CHANGE UP (ref 0–6)
GLUCOSE SERPL-MCNC: 105 MG/DL — HIGH (ref 70–99)
HCT VFR BLD CALC: 25.7 % — LOW (ref 34.5–45)
HGB BLD-MCNC: 7.6 G/DL — LOW (ref 11.5–15.5)
IMM GRANULOCYTES NFR BLD AUTO: 1 % — HIGH (ref 0–0.9)
LYMPHOCYTES # BLD AUTO: 0.84 K/UL — LOW (ref 1–3.3)
LYMPHOCYTES # BLD AUTO: 11.4 % — LOW (ref 13–44)
MAGNESIUM SERPL-MCNC: 1.8 MG/DL — SIGNIFICANT CHANGE UP (ref 1.6–2.6)
MCHC RBC-ENTMCNC: 28.9 PG — SIGNIFICANT CHANGE UP (ref 27–34)
MCHC RBC-ENTMCNC: 29.6 GM/DL — LOW (ref 32–36)
MCV RBC AUTO: 97.7 FL — SIGNIFICANT CHANGE UP (ref 80–100)
MONOCYTES # BLD AUTO: 0.51 K/UL — SIGNIFICANT CHANGE UP (ref 0–0.9)
MONOCYTES NFR BLD AUTO: 6.9 % — SIGNIFICANT CHANGE UP (ref 2–14)
NEUTROPHILS # BLD AUTO: 5.59 K/UL — SIGNIFICANT CHANGE UP (ref 1.8–7.4)
NEUTROPHILS NFR BLD AUTO: 76.1 % — SIGNIFICANT CHANGE UP (ref 43–77)
NRBC # BLD: 0 /100 WBCS — SIGNIFICANT CHANGE UP (ref 0–0)
PHOSPHATE SERPL-MCNC: 2.8 MG/DL — SIGNIFICANT CHANGE UP (ref 2.5–4.5)
PLATELET # BLD AUTO: 244 K/UL — SIGNIFICANT CHANGE UP (ref 150–400)
POTASSIUM SERPL-MCNC: 3.6 MMOL/L — SIGNIFICANT CHANGE UP (ref 3.5–5.3)
POTASSIUM SERPL-SCNC: 3.6 MMOL/L — SIGNIFICANT CHANGE UP (ref 3.5–5.3)
PROT SERPL-MCNC: 4.7 G/DL — LOW (ref 6–8.3)
RBC # BLD: 2.63 M/UL — LOW (ref 3.8–5.2)
RBC # FLD: 15.8 % — HIGH (ref 10.3–14.5)
SODIUM SERPL-SCNC: 136 MMOL/L — SIGNIFICANT CHANGE UP (ref 135–145)
WBC # BLD: 7.35 K/UL — SIGNIFICANT CHANGE UP (ref 3.8–10.5)
WBC # FLD AUTO: 7.35 K/UL — SIGNIFICANT CHANGE UP (ref 3.8–10.5)

## 2023-02-25 PROCEDURE — 99233 SBSQ HOSP IP/OBS HIGH 50: CPT | Mod: GC

## 2023-02-25 RX ORDER — MAGNESIUM SULFATE 500 MG/ML
2 VIAL (ML) INJECTION ONCE
Refills: 0 | Status: COMPLETED | OUTPATIENT
Start: 2023-02-25 | End: 2023-02-25

## 2023-02-25 RX ORDER — IRON SUCROSE 20 MG/ML
200 INJECTION, SOLUTION INTRAVENOUS EVERY 24 HOURS
Refills: 0 | Status: DISCONTINUED | OUTPATIENT
Start: 2023-02-25 | End: 2023-02-28

## 2023-02-25 RX ORDER — POTASSIUM CHLORIDE 20 MEQ
20 PACKET (EA) ORAL
Refills: 0 | Status: COMPLETED | OUTPATIENT
Start: 2023-02-25 | End: 2023-02-25

## 2023-02-25 RX ADMIN — Medication 20 MILLIEQUIVALENT(S): at 09:36

## 2023-02-25 RX ADMIN — PANTOPRAZOLE SODIUM 40 MILLIGRAM(S): 20 TABLET, DELAYED RELEASE ORAL at 18:47

## 2023-02-25 RX ADMIN — Medication 1 GRAM(S): at 01:15

## 2023-02-25 RX ADMIN — Medication 1 GRAM(S): at 19:32

## 2023-02-25 RX ADMIN — Medication 25 GRAM(S): at 11:36

## 2023-02-25 RX ADMIN — LAMOTRIGINE 100 MILLIGRAM(S): 25 TABLET, ORALLY DISINTEGRATING ORAL at 09:36

## 2023-02-25 RX ADMIN — Medication 1 GRAM(S): at 14:18

## 2023-02-25 RX ADMIN — Medication 1 GRAM(S): at 07:32

## 2023-02-25 RX ADMIN — PIPERACILLIN AND TAZOBACTAM 25 GRAM(S): 4; .5 INJECTION, POWDER, LYOPHILIZED, FOR SOLUTION INTRAVENOUS at 07:33

## 2023-02-25 RX ADMIN — ARIPIPRAZOLE 2 MILLIGRAM(S): 15 TABLET ORAL at 16:49

## 2023-02-25 RX ADMIN — ENOXAPARIN SODIUM 85 MILLIGRAM(S): 100 INJECTION SUBCUTANEOUS at 00:03

## 2023-02-25 RX ADMIN — Medication 30 MILLIGRAM(S): at 09:36

## 2023-02-25 RX ADMIN — ENOXAPARIN SODIUM 85 MILLIGRAM(S): 100 INJECTION SUBCUTANEOUS at 11:40

## 2023-02-25 RX ADMIN — PANTOPRAZOLE SODIUM 40 MILLIGRAM(S): 20 TABLET, DELAYED RELEASE ORAL at 07:33

## 2023-02-25 RX ADMIN — Medication 20 MILLIEQUIVALENT(S): at 11:39

## 2023-02-25 RX ADMIN — IRON SUCROSE 110 MILLIGRAM(S): 20 INJECTION, SOLUTION INTRAVENOUS at 19:00

## 2023-02-25 NOTE — PROGRESS NOTE ADULT - SUBJECTIVE AND OBJECTIVE BOX
NATHEN CHOW, 77y, Female  MRN-[id number]  Patient is a 77y old  Female who presents with a chief complaint of Aspiration PNA, hematemesis w/ known esophageal ulcer (24 Feb 2023 11:39)      OVERNIGHT EVENTS:     SUBJECTIVE:    12 Point ROS Negative unless noted otherwise above.  -------------------------------------------------------------------------------  VITAL SIGNS:  Vital Signs Last 24 Hrs  T(C): 36.8 (25 Feb 2023 07:07), Max: 37.1 (24 Feb 2023 10:03)  T(F): 98.2 (25 Feb 2023 07:07), Max: 98.8 (24 Feb 2023 10:03)  HR: 59 (25 Feb 2023 03:30) (58 - 78)  BP: 102/59 (25 Feb 2023 03:24) (100/57 - 144/67)  BP(mean): 79 (25 Feb 2023 03:24) (75 - 96)  RR: 18 (25 Feb 2023 03:30) (16 - 22)  SpO2: 97% (25 Feb 2023 03:30) (80% - 98%)    Parameters below as of 25 Feb 2023 03:30  Patient On (Oxygen Delivery Method): nasal cannula w/ humidification  O2 Flow (L/min): 6    I&O's Summary    24 Feb 2023 07:01  -  25 Feb 2023 07:00  --------------------------------------------------------  IN: 100 mL / OUT: 750 mL / NET: -650 mL        PHYSICAL EXAM:    General: NAD, well developed  HEENT: NC/AT; EOMI, PERRLA, anicteric sclera; moist mucosal membranes.  Neck: supple, trachea midline  Cardiovascular: RRR, +S1/S2; NO M/R/G  Respiratory: CTA B/L; no W/R/R  Gastrointestinal: soft, NT/ND; +BSx4  Extremities: WWP; no edema or cyanosis  Vascular: 2+ radial, DP/PT pulses B/L  Neurological: AAOx3; no focal deficits    ALLERGIES:  Allergies    lanolin topical (Rash)    Intolerances        MEDICATIONS:  MEDICATIONS  (STANDING):  ARIPiprazole 2 milliGRAM(s) Oral every 24 hours  enoxaparin Injectable 85 milliGRAM(s) SubCutaneous every 12 hours  lamoTRIgine 100 milliGRAM(s) Oral every 24 hours  pantoprazole    Tablet 40 milliGRAM(s) Oral every 12 hours  PARoxetine 30 milliGRAM(s) Oral every 24 hours  sucralfate suspension 1 Gram(s) Oral every 6 hours    MEDICATIONS  (PRN):  acetaminophen     Tablet .. 650 milliGRAM(s) Oral every 6 hours PRN Temp greater or equal to 38C (100.4F)  ondansetron Injectable 4 milliGRAM(s) IV Push every 8 hours PRN Nausea and/or Vomiting      -------------------------------------------------------------------------------  LABS:                        7.6    7.35  )-----------( 244      ( 25 Feb 2023 06:56 )             25.7     02-25    136  |  99  |  8   ----------------------------<  105<H>  3.6   |  31  |  0.67    Ca    8.9      25 Feb 2023 06:56  Phos  2.8     02-25  Mg     1.8     02-25    TPro  4.7<L>  /  Alb  2.3<L>  /  TBili  <0.2  /  DBili  x   /  AST  26  /  ALT  36  /  AlkPhos  143<H>  02-25    LIVER FUNCTIONS - ( 25 Feb 2023 06:56 )  Alb: 2.3 g/dL / Pro: 4.7 g/dL / ALK PHOS: 143 U/L / ALT: 36 U/L / AST: 26 U/L / GGT: x               CAPILLARY BLOOD GLUCOSE          COVID-19 PCR: Negative (16 Feb 2023 10:56)      RADIOLOGY & ADDITIONAL TESTS: Reviewed.       NATHEN CHOW, 77y, Female  MRN-[id number]  Patient is a 77y old  Female who presents with a chief complaint of Aspiration PNA, hematemesis w/ known esophageal ulcer (24 Feb 2023 11:39)      OVERNIGHT EVENTS: SHI    SUBJECTIVE: Pt assessed at bedside, denies any new issues. States she feels well and is tolerating nasal canula. Patient denies fever/chills, nausea, vomiting, headache, chest pain, palpitations, dyspnea, diarrhea, abdominal pain.    12 Point ROS Negative unless noted otherwise above.  -------------------------------------------------------------------------------  VITAL SIGNS:  Vital Signs Last 24 Hrs  T(C): 36.8 (25 Feb 2023 07:07), Max: 37.1 (24 Feb 2023 10:03)  T(F): 98.2 (25 Feb 2023 07:07), Max: 98.8 (24 Feb 2023 10:03)  HR: 59 (25 Feb 2023 03:30) (58 - 78)  BP: 102/59 (25 Feb 2023 03:24) (100/57 - 144/67)  BP(mean): 79 (25 Feb 2023 03:24) (75 - 96)  RR: 18 (25 Feb 2023 03:30) (16 - 22)  SpO2: 97% (25 Feb 2023 03:30) (80% - 98%)    Parameters below as of 25 Feb 2023 03:30  Patient On (Oxygen Delivery Method): nasal cannula w/ humidification  O2 Flow (L/min): 6    I&O's Summary    24 Feb 2023 07:01  -  25 Feb 2023 07:00  --------------------------------------------------------  IN: 100 mL / OUT: 750 mL / NET: -650 mL        PHYSICAL EXAM:    General: In bed in no acute distress on NC  HEENT: NCAT; PERRL, anicteric sclera; MMM  Neck: supple, trachea midline  Cardiovascular: S1, S2 normal; RRR, no M/G/R  Respiratory: CTA, no wheezing or crackles   Gastrointestinal: soft, nontender, nondistended. bowel sounds present.  Skin: no ulcerations or visible rashes appreciated  Extremities: WWP; no edema, clubbing or cyanosis  Vascular: 2+ radial, DP/PT pulses B/L  Neurological: AAOx3; CN II-XII grossly intact; no focal deficits    ALLERGIES:  Allergies    lanolin topical (Rash)    Intolerances        MEDICATIONS:  MEDICATIONS  (STANDING):  ARIPiprazole 2 milliGRAM(s) Oral every 24 hours  enoxaparin Injectable 85 milliGRAM(s) SubCutaneous every 12 hours  lamoTRIgine 100 milliGRAM(s) Oral every 24 hours  pantoprazole    Tablet 40 milliGRAM(s) Oral every 12 hours  PARoxetine 30 milliGRAM(s) Oral every 24 hours  sucralfate suspension 1 Gram(s) Oral every 6 hours    MEDICATIONS  (PRN):  acetaminophen     Tablet .. 650 milliGRAM(s) Oral every 6 hours PRN Temp greater or equal to 38C (100.4F)  ondansetron Injectable 4 milliGRAM(s) IV Push every 8 hours PRN Nausea and/or Vomiting      -------------------------------------------------------------------------------  LABS:                        7.6    7.35  )-----------( 244      ( 25 Feb 2023 06:56 )             25.7     02-25    136  |  99  |  8   ----------------------------<  105<H>  3.6   |  31  |  0.67    Ca    8.9      25 Feb 2023 06:56  Phos  2.8     02-25  Mg     1.8     02-25    TPro  4.7<L>  /  Alb  2.3<L>  /  TBili  <0.2  /  DBili  x   /  AST  26  /  ALT  36  /  AlkPhos  143<H>  02-25    LIVER FUNCTIONS - ( 25 Feb 2023 06:56 )  Alb: 2.3 g/dL / Pro: 4.7 g/dL / ALK PHOS: 143 U/L / ALT: 36 U/L / AST: 26 U/L / GGT: x               CAPILLARY BLOOD GLUCOSE          COVID-19 PCR: Negative (16 Feb 2023 10:56)      RADIOLOGY & ADDITIONAL TESTS: Reviewed.       NATHEN CHOW, 77y, Female  MRN-[id number]  Patient is a 77y old  Female who presents with a chief complaint of Aspiration PNA, hematemesis w/ known esophageal ulcer (24 Feb 2023 11:39)    Hospital Course: 77F PMHx of hiatal hernia, esophageal ulcer (diagnosed at [facility] in 1/2023), HLD, depression (on aripiprazole and paroxetine), and seizure disorder (last seizure 2 years ago, on lamotrigine), presented to [facility] ED after 6 days of dark black emesis and c/f aspiration PNA. Symptoms began after drinking a glass of wine, and have persisted. Of note, patient presented to [facility] with similar symptoms and hospital stay was prolonged due to weaning off supplemental oxygen. GI consulted. Patient was intubated for EGD, which found grade D esophagitis, ulceration, and a large hiatal hernia. Patient was extubated to NRB, SpO2 94%. Attempted to wean to 6LNC, SpO2 84%. ICU was consulted for AHRF, recommending telemetry for HFNC. Patient placed on HFNC and stepped up to 7[place]. In 7l[place]mann she continued to spike fevers intermittently with the last fever on 02/21 1AM (100.7), her oxygen requirement has also steadily increased despite being on zosyn 4.5g q8hrs. Bedside POCUS showed RV dialtion initially concerning for volume overload, patient diuresed but patient's respiratory status did not improve. She underwent CTPE which showed b/l pulmonary emboli with saddle embolus on the right with extension into multiple upper and lower lobe pulmonary arteries and LE doppler with Deep venous thrombosis in bilateral calf veins. Patient was started on heparin drip and per structural heart team, no further intervention besides AC. Pt was weaned off HFNC and transitioned to 6L NC on 2/24.     OVERNIGHT EVENTS: SHI    SUBJECTIVE: Pt assessed at bedside, denies any new issues. States she feels well and is tolerating nasal canula. Patient denies fever/chills, nausea, vomiting, headache, chest pain, palpitations, dyspnea, diarrhea, abdominal pain.    12 Point ROS Negative unless noted otherwise above.  -------------------------------------------------------------------------------  VITAL SIGNS:  Vital Signs Last 24 Hrs  T(C): 36.8 (25 Feb 2023 07:07), Max: 37.1 (24 Feb 2023 10:03)  T(F): 98.2 (25 Feb 2023 07:07), Max: 98.8 (24 Feb 2023 10:03)  HR: 59 (25 Feb 2023 03:30) (58 - 78)  BP: 102/59 (25 Feb 2023 03:24) (100/57 - 144/67)  BP(mean): 79 (25 Feb 2023 03:24) (75 - 96)  RR: 18 (25 Feb 2023 03:30) (16 - 22)  SpO2: 97% (25 Feb 2023 03:30) (80% - 98%)    Parameters below as of 25 Feb 2023 03:30  Patient On (Oxygen Delivery Method): nasal cannula w/ humidification  O2 Flow (L/min): 6    I&O's Summary    24 Feb 2023 07:01  -  25 Feb 2023 07:00  --------------------------------------------------------  IN: 100 mL / OUT: 750 mL / NET: -650 mL        PHYSICAL EXAM:    General: In bed in no acute distress on NC  HEENT: NCAT; PERRL, anicteric sclera; MMM  Neck: supple, trachea midline  Cardiovascular: S1, S2 normal; RRR, no M/G/R  Respiratory: CTA, no wheezing or crackles   Gastrointestinal: soft, nontender, nondistended. bowel sounds present.  Skin: no ulcerations or visible rashes appreciated  Extremities: WWP; no edema, clubbing or cyanosis  Vascular: 2+ radial, DP/PT pulses B/L  Neurological: AAOx3; CN II-XII grossly intact; no focal deficits    ALLERGIES:  Allergies    lanolin topical (Rash)    Intolerances        MEDICATIONS:  MEDICATIONS  (STANDING):  ARIPiprazole 2 milliGRAM(s) Oral every 24 hours  enoxaparin Injectable 85 milliGRAM(s) SubCutaneous every 12 hours  lamoTRIgine 100 milliGRAM(s) Oral every 24 hours  pantoprazole    Tablet 40 milliGRAM(s) Oral every 12 hours  PARoxetine 30 milliGRAM(s) Oral every 24 hours  sucralfate suspension 1 Gram(s) Oral every 6 hours    MEDICATIONS  (PRN):  acetaminophen     Tablet .. 650 milliGRAM(s) Oral every 6 hours PRN Temp greater or equal to 38C (100.4F)  ondansetron Injectable 4 milliGRAM(s) IV Push every 8 hours PRN Nausea and/or Vomiting      -------------------------------------------------------------------------------  LABS:                        7.6    7.35  )-----------( 244      ( 25 Feb 2023 06:56 )             25.7     02-25    136  |  99  |  8   ----------------------------<  105<H>  3.6   |  31  |  0.67    Ca    8.9      25 Feb 2023 06:56  Phos  2.8     02-25  Mg     1.8     02-25    TPro  4.7<L>  /  Alb  2.3<L>  /  TBili  <0.2  /  DBili  x   /  AST  26  /  ALT  36  /  AlkPhos  143<H>  02-25    LIVER FUNCTIONS - ( 25 Feb 2023 06:56 )  Alb: 2.3 g/dL / Pro: 4.7 g/dL / ALK PHOS: 143 U/L / ALT: 36 U/L / AST: 26 U/L / GGT: x               CAPILLARY BLOOD GLUCOSE          COVID-19 PCR: Negative (16 Feb 2023 10:56)      RADIOLOGY & ADDITIONAL TESTS: Reviewed.

## 2023-02-25 NOTE — PROGRESS NOTE ADULT - PROBLEM SELECTOR PLAN 3
Pt w/ worsening cough and voice hoarseness i/s/o daily emesis, found to have mild leukocytosis, neutrophilic predominance, and lactate of 2.5 w/ left lobe infiltrate on CXR. Initially hypotensive on presentation to ED 80s/60s. now improved after 2.5 L NS. Likely aspiration PNA 2/2 intractable vomiting vs CAP. s/p 1 dose of unasyn in the ED, started on ceftx 2g q24 which was transitioned to Zosyn with pseudomonal coverage due to concern for aspiration pneumonia post endoscopy. Pt weaned off HFNC and is currently on NC 6L tolerating well.  Plan:  - s/p Zosyn 4.5 q4hrs (7 day course) - end 2/24  - Blood culture (2/19) NGTD and sputum culture (2/20 - collected)   - blood culture NGTD from 2/16  - Wean O2 as tolerated  - incentive spirometry  - Chest PT   - OOBTC

## 2023-02-25 NOTE — PROGRESS NOTE ADULT - PROBLEM SELECTOR PLAN 8
F: none  E: replete K<4, Mg<2  N: regular  VTE: lovenox  GI: protonix ggt  C: DNR/DNI (new molst in chart)  D: RMF

## 2023-02-25 NOTE — PROGRESS NOTE ADULT - PROBLEM SELECTOR PLAN 2
Presented with UGIB w/ associated coffee-ground emesis. Pt w/ known hx of esophageal ulcer, diagnosed at [facility] in 1/2023, discharged on carafate and protonix with surgery f/u for hiatal hernia repair. Presented w/ 6 days of dark black emesis. No signs of active bleeding currently, Hgb stable. GI consulted, endoscopy done showing large sized paraesophageal hiatal hernia, Grade D esophagitis with no bleeding was seen starting at 30 cm from the incisors in the lower third of the esophagus and middle third of the esophagus, compatible with nonspecific erosive esophagitis. This is likely the source of coffee ground emesis. Patchy erythema of the mucosa with no bleeding was noted in the stomach body. These findings are compatible with non-erosive gastritis  Plan:  - Pantoprazole 40 mg PO BID x 2 weeks then daily  - Carafate Suspension 1g po qid  x 2 weeks

## 2023-02-25 NOTE — PROGRESS NOTE ADULT - PROBLEM SELECTOR PLAN 4
MCV 94 2/2 to active hematemesis i/s/o known esophageal ulcer. Iron 20, Ferritin 154, total binding capacity 164, %sat 12. Likely 2/2 mixed picture of iron deficiency w/ possible concurrent anemia of chronic disease. Hgb remains stable, 8.2 today.  Plan:  - Trend CBC  - Maintain active T&S (order for 2/18)  - transfuse if Hgb <7 MCV 94 2/2 to active hematemesis i/s/o known esophageal ulcer. Iron 20, Ferritin 154, total binding capacity 164, %sat 12. Likely 2/2 mixed picture of iron deficiency w/ possible concurrent anemia of chronic disease. Hgb remains stable, 8.2 today.  Plan:  - started on IV iron 200mg qd for 5 days   - Trend CBC  - Maintain active T&S (order for 2/18)  - transfuse if Hgb <7

## 2023-02-26 LAB
ALBUMIN SERPL ELPH-MCNC: 2.4 G/DL — LOW (ref 3.3–5)
ALP SERPL-CCNC: 165 U/L — HIGH (ref 40–120)
ALT FLD-CCNC: 42 U/L — SIGNIFICANT CHANGE UP (ref 10–45)
ANION GAP SERPL CALC-SCNC: 8 MMOL/L — SIGNIFICANT CHANGE UP (ref 5–17)
AST SERPL-CCNC: 32 U/L — SIGNIFICANT CHANGE UP (ref 10–40)
BILIRUB SERPL-MCNC: <0.2 MG/DL — SIGNIFICANT CHANGE UP (ref 0.2–1.2)
BUN SERPL-MCNC: 6 MG/DL — LOW (ref 7–23)
CALCIUM SERPL-MCNC: 8.8 MG/DL — SIGNIFICANT CHANGE UP (ref 8.4–10.5)
CHLORIDE SERPL-SCNC: 99 MMOL/L — SIGNIFICANT CHANGE UP (ref 96–108)
CO2 SERPL-SCNC: 29 MMOL/L — SIGNIFICANT CHANGE UP (ref 22–31)
CREAT SERPL-MCNC: 0.59 MG/DL — SIGNIFICANT CHANGE UP (ref 0.5–1.3)
EGFR: 92 ML/MIN/1.73M2 — SIGNIFICANT CHANGE UP
GLUCOSE SERPL-MCNC: 109 MG/DL — HIGH (ref 70–99)
HCT VFR BLD CALC: 24.8 % — LOW (ref 34.5–45)
HGB BLD-MCNC: 7.6 G/DL — LOW (ref 11.5–15.5)
MAGNESIUM SERPL-MCNC: 2 MG/DL — SIGNIFICANT CHANGE UP (ref 1.6–2.6)
MCHC RBC-ENTMCNC: 29.6 PG — SIGNIFICANT CHANGE UP (ref 27–34)
MCHC RBC-ENTMCNC: 30.6 GM/DL — LOW (ref 32–36)
MCV RBC AUTO: 96.5 FL — SIGNIFICANT CHANGE UP (ref 80–100)
NRBC # BLD: 0 /100 WBCS — SIGNIFICANT CHANGE UP (ref 0–0)
PHOSPHATE SERPL-MCNC: 2.2 MG/DL — LOW (ref 2.5–4.5)
PLATELET # BLD AUTO: 322 K/UL — SIGNIFICANT CHANGE UP (ref 150–400)
POTASSIUM SERPL-MCNC: 4.2 MMOL/L — SIGNIFICANT CHANGE UP (ref 3.5–5.3)
POTASSIUM SERPL-SCNC: 4.2 MMOL/L — SIGNIFICANT CHANGE UP (ref 3.5–5.3)
PROT SERPL-MCNC: 5 G/DL — LOW (ref 6–8.3)
RBC # BLD: 2.57 M/UL — LOW (ref 3.8–5.2)
RBC # FLD: 15.6 % — HIGH (ref 10.3–14.5)
RETICS #: 42.7 K/UL — SIGNIFICANT CHANGE UP (ref 25–125)
RETICS/RBC NFR: 1.6 % — SIGNIFICANT CHANGE UP (ref 0.5–2.5)
SODIUM SERPL-SCNC: 136 MMOL/L — SIGNIFICANT CHANGE UP (ref 135–145)
WBC # BLD: 7.39 K/UL — SIGNIFICANT CHANGE UP (ref 3.8–10.5)
WBC # FLD AUTO: 7.39 K/UL — SIGNIFICANT CHANGE UP (ref 3.8–10.5)

## 2023-02-26 PROCEDURE — 99233 SBSQ HOSP IP/OBS HIGH 50: CPT | Mod: GC

## 2023-02-26 PROCEDURE — 99232 SBSQ HOSP IP/OBS MODERATE 35: CPT | Mod: GC

## 2023-02-26 RX ORDER — SALICYLIC ACID 0.5 %
1 CLEANSER (GRAM) TOPICAL EVERY 12 HOURS
Refills: 0 | Status: DISCONTINUED | OUTPATIENT
Start: 2023-02-26 | End: 2023-02-28

## 2023-02-26 RX ADMIN — Medication 1 GRAM(S): at 07:12

## 2023-02-26 RX ADMIN — LAMOTRIGINE 100 MILLIGRAM(S): 25 TABLET, ORALLY DISINTEGRATING ORAL at 10:12

## 2023-02-26 RX ADMIN — PANTOPRAZOLE SODIUM 40 MILLIGRAM(S): 20 TABLET, DELAYED RELEASE ORAL at 17:37

## 2023-02-26 RX ADMIN — ENOXAPARIN SODIUM 85 MILLIGRAM(S): 100 INJECTION SUBCUTANEOUS at 00:09

## 2023-02-26 RX ADMIN — ARIPIPRAZOLE 2 MILLIGRAM(S): 15 TABLET ORAL at 17:37

## 2023-02-26 RX ADMIN — PANTOPRAZOLE SODIUM 40 MILLIGRAM(S): 20 TABLET, DELAYED RELEASE ORAL at 06:21

## 2023-02-26 RX ADMIN — ENOXAPARIN SODIUM 85 MILLIGRAM(S): 100 INJECTION SUBCUTANEOUS at 11:36

## 2023-02-26 RX ADMIN — Medication 1 GRAM(S): at 01:37

## 2023-02-26 RX ADMIN — IRON SUCROSE 110 MILLIGRAM(S): 20 INJECTION, SOLUTION INTRAVENOUS at 14:49

## 2023-02-26 RX ADMIN — Medication 30 MILLIGRAM(S): at 10:12

## 2023-02-26 RX ADMIN — Medication 1 GRAM(S): at 13:53

## 2023-02-26 RX ADMIN — Medication 1 GRAM(S): at 20:20

## 2023-02-26 NOTE — PROGRESS NOTE ADULT - PROBLEM SELECTOR PLAN 1
Patient with intermittent fevers and increasing O2 requirement with max settings on HFNC but good saturation   CTA Chest w/ numerous bilateral pulmonary emboli, saddle embolus on the right with extension into multiple upper and lower lobe pulmonary arteries. US Deep venous thrombosis in bilateral calf veins. Acute deep venous thrombosis: below the knee. TTE showing dilated right ventricular size. Reduced right ventricular systolic function. Dilated right atrium. PERT team signed off with no interventions planned. No personal history or family history of any     Plan:  - Continue with Lovenox 1 mg/kg BID.   - Continue nasal canula for oxygen support and wean as tolerated.  - Patient will need long term discharge AC, assess cost of DOAC. Patient admitted with AHRF, treated for pneumonia with continued hypoxia. CTA Chest w/ numerous bilateral pulmonary emboli, saddle embolus on the right with extension into upper and lower lobe pulmonary arteries. LE US shows deep venous thrombosis in bilateral calf veins. TTE showing dilated right ventricular size, reduced right ventricular systolic function. Dilated right atrium. PERT team consulted but ultimately signed off with no interventions planned. No personal history or family history of any blood clots in the past, notably off AC for 5 days on admission.     Plan:  - Continue with Lovenox 1 mg/kg BID.   - Continue nasal canula for oxygen support and wean as tolerated.  - Patient will need long term discharge AC, plan to discuss doacs with patient.

## 2023-02-26 NOTE — PROGRESS NOTE ADULT - PROBLEM SELECTOR PLAN 4
On home Abilify 2 mg QD, Paxil 40 qd, and lorazepam 1g PRN.  Plan:  - Continue home Abilify and Paxil.   - Hold lorazepam. On home Abilify 2 mg QD, Paxil 40 qd, and lorazepam 1g PRN.  Plan:  - Continue home Abilify and Paxil.   - Hold lorazepam.    # Seizure Disorder: Last episode 2 years prior, on home lamotrigine.   - Continue home dose of lamotrigine.

## 2023-02-26 NOTE — PROGRESS NOTE ADULT - ASSESSMENT
78 y/o F w/ hx of hiatal hernia and esophageal ulcer, presenting with 6 days of dark black emesis, hoarseness, and anorexia, admitted for UGIB and aspiration PNA. Underwent EGD which showed a grade D esophageal ulceration, continued on PPI with stable hemoglobin. Pateint initial presentation concerning for aspiration pneumonia which was treated with a 7 day course of Zosyn. Patient with continued hypoxia and found to have numerous bilateral pulmonary emboli, saddle embolus on the right with extension into multiple upper and lower lobe pulmonary arteries with DVTs in the bilateral lower extremities. Patient is now tolerating nasal canula, weaned off high flow. Patient transferred to [facility].

## 2023-02-26 NOTE — PROGRESS NOTE ADULT - PROBLEM SELECTOR PLAN 3
MCV 94 2/2 to active hematemesis i/s/o known esophageal ulcer. Iron 20, Ferritin 154, total binding capacity 164, %sat 12. Likely 2/2 mixed picture of iron deficiency w/ possible concurrent anemia of chronic disease. Hgb remains stable     Plan:  - Continue IV iron 200mg qd for 5 days. - finishes 3/1.   - Trend CBC.  - Maintain active T&S - Last 2/24.   - transfuse if Hgb <7. MCV 94 2/2 to active hematemesis i/s/o known esophageal ulcer. Iron 20, Ferritin 154, total binding capacity 164, %sat 12. Likely 2/2 mixed picture of iron deficiency w/ possible concurrent anemia of chronic disease. Hgb remains stable. Patient did not require transfusion this admission.     Plan:  - Continue IV iron 200mg qd for 5 days. - finishes 3/1.   - Trend CBC.  - Maintain active T&S - Last 2/24.   - transfuse if Hgb <7.

## 2023-02-26 NOTE — PROGRESS NOTE ADULT - PROBLEM SELECTOR PLAN 8
F: none  E: replete K<4, Mg<2  N: regular  VTE: Lovenox 85 mg BID  GI: Protonix 40 mg BID.  C: DNR/DNI (new molst in chart)  D: RMF

## 2023-02-26 NOTE — PROGRESS NOTE ADULT - PROBLEM SELECTOR PLAN 7
Resolved. Pt w/ worsening cough and voice hoarseness i/s/o daily emesis, found to have mild leukocytosis, neutrophilic predominance, and lactate of 2.5 w/ left lobe infiltrate on CXR. Initially hypotensive on presentation to ED 80s/60s. now improved after 2.5 L NS. Likely aspiration PNA 2/2 intractable vomiting vs CAP. s/p 1 dose of unasyn in the ED, started on ceftx 2g q24 which was transitioned to Zosyn with pseudomonal coverage due to concern for aspiration pneumonia post endoscopy. Pt weaned off HFNC and is currently on NC 6L tolerating well. Patient finished 7 days of antibiotics with Zosyn IV. Blood culture (2/19) NGTD and blood culture 2/16 NGTD.    - Wean O2 as tolerated  - Incentive spirometry  - Chest PT   - OOBTC

## 2023-02-26 NOTE — PROGRESS NOTE ADULT - PROBLEM SELECTOR PLAN 6
On home atorvastatin 20 mg  - c/w home meds On home atorvastatin 20 mg  - c/w home meds    # Elevated Alkaline Phosphatase: Normal alk phos and LFTs on admission, uptrend on he 20th prior to diagnosis of PE. Patient found with PE, treated with AC. Uptrend likely in the setting of hepatic congestion secondary to right heart strain, transaminitis downtrended and potentially alk phos is lagging.

## 2023-02-26 NOTE — PROGRESS NOTE ADULT - PROBLEM SELECTOR PLAN 2
Lab results reviewed and abnormals discussed with physician.   Presented with UGIB w/ associated coffee-ground emesis. Pt w/ known hx of esophageal ulcer, diagnosed at [facility] in 1/2023, discharged on carafate and protonix with surgery f/u for hiatal hernia repair. Presented w/ 6 days of dark black emesis. No signs of active bleeding currently, Hgb stable. GI consulted, endoscopy done showing large sized paraesophageal hiatal hernia, Grade D esophagitis with no bleeding was seen starting at 30 cm from the incisors in the lower third of the esophagus and middle third of the esophagus, compatible with nonspecific erosive esophagitis. This is likely the source of coffee ground emesis. Patchy erythema of the mucosa with no bleeding was noted in the stomach body. These findings are compatible with non-erosive gastritis    Plan:  - Pantoprazole 40 mg PO BID x 2 weeks then daily.  - Carafate Suspension 1g po qid  x 2 weeks. Presented with UGIB w/ associated coffee-ground emesis. Pt w/ known hx of esophageal ulcer, diagnosed at [facility] in 1/2023, discharged on Carafate and protonix with surgery f/u for hiatal hernia repair. Presented w/ 6 days of dark black emesis. No signs of active bleeding currently, Hgb stable. GI consulted, endoscopy done showing large sized paraesophageal hiatal hernia, Grade D esophagitis with no bleeding was seen starting at 30 cm from the incisors in the lower third of the esophagus and middle third of the esophagus, compatible with nonspecific erosive esophagitis. This is likely the source of coffee ground emesis. Patchy erythema of the mucosa with no bleeding was noted in the stomach body. These findings are compatible with non-erosive gastritis    Plan:  - Pantoprazole 40 mg PO BID x 2 weeks then daily.  - Carafate Suspension 1g po qid  x 2 weeks.

## 2023-02-26 NOTE — PROGRESS NOTE ADULT - SUBJECTIVE AND OBJECTIVE BOX
*****************Stepdown from [facility] to [facility]***************  Hospital Course: 77F PMHx of hiatal hernia, esophageal ulcer (diagnosed at [facility] in 1/2023), HLD, depression (on aripiprazole and paroxetine), and seizure disorder (last seizure 2 years ago, on lamotrigine), presented to [facility] ED after 6 days of dark black emesis and c/f aspiration PNA. Symptoms began after drinking a glass of wine, and have persisted. Of note, patient presented to [facility] with similar symptoms and hospital stay was prolonged due to weaning off supplemental oxygen. GI consulted. Patient was intubated for EGD, which found grade D esophagitis, ulceration, and a large hiatal hernia. Patient was extubated to NRB, SpO2 94%. Attempted to wean to 6LNC, SpO2 84%. ICU was consulted for AHRF, recommending telemetry for HFNC. Patient placed on HFNC and stepped up to [facility]. In [facility] she continued to spike fevers intermittently with the last fever on 02/21 1AM (100.7), her oxygen requirement has also steadily increased despite being on zosyn 4.5g q8hrs. Bedside POCUS showed RV dialtion initially concerning for volume overload, patient diuresed but patient's respiratory status did not improve. She underwent CTPE which showed b/l pulmonary emboli with saddle embolus on the right with extension into multiple upper and lower lobe pulmonary arteries and LE doppler with Deep venous thrombosis in bilateral calf veins. Patient was started on heparin drip and PERT team and structural heart were consulted with tentative plan for thrombectomy vs IVC filter placement. Patient was continued on therapeutic lovenox and ultimately did not for a thrombectomy. She was started on IV Fe Sucrose for Fe Deficiency Anemia. Patient was continued on her home meds. Patient is medically stable for discharge to the [facility].     O/N Events: SHI     Subjective/ROS: Patient seen and examined at bedside. Endorses feeling well. No new complaints     Denies Fever/Chills, HA, CP, SOB, n/v, changes in bowel/urinary habits.  12pt ROS otherwise negative.    VITALS  Vital Signs Last 24 Hrs  T(C): 36.7 (26 Feb 2023 05:47), Max: 37.1 (25 Feb 2023 22:07)  T(F): 98.1 (26 Feb 2023 05:47), Max: 98.8 (25 Feb 2023 22:07)  HR: 78 (26 Feb 2023 08:38) (60 - 78)  BP: 102/57 (26 Feb 2023 08:38) (102/55 - 120/59)  BP(mean): 76 (26 Feb 2023 08:38) (73 - 85)  RR: 18 (26 Feb 2023 08:38) (15 - 18)  SpO2: 97% (26 Feb 2023 08:38) (91% - 100%)    Parameters below as of 26 Feb 2023 08:38  Patient On (Oxygen Delivery Method): nasal cannula  O2 Flow (L/min): 6      CAPILLARY BLOOD GLUCOSE          PHYSICAL EXAM  General: In bed, NAD, on NC  HEENT: NCAT; MMM  Neck: Supple, trachea midline  Cardiovascular: S1, S2 normal; RRR, no M/G/R  Respiratory: CTA, no wheezing or crackles   Gastrointestinal: soft, NTND. bowel sounds wnl  Extremities: WWP; no edema, clubbing or cyanosis  Vascular: 2+ radial, DP/PT pulses B/L  Neurological: AAOx3; no focal deficits    MEDICATIONS  (STANDING):  ARIPiprazole 2 milliGRAM(s) Oral every 24 hours  enoxaparin Injectable 85 milliGRAM(s) SubCutaneous every 12 hours  iron sucrose IVPB 200 milliGRAM(s) IV Intermittent every 24 hours  lamoTRIgine 100 milliGRAM(s) Oral every 24 hours  pantoprazole    Tablet 40 milliGRAM(s) Oral every 12 hours  PARoxetine 30 milliGRAM(s) Oral every 24 hours  sucralfate suspension 1 Gram(s) Oral every 6 hours    MEDICATIONS  (PRN):  acetaminophen     Tablet .. 650 milliGRAM(s) Oral every 6 hours PRN Temp greater or equal to 38C (100.4F)  ondansetron Injectable 4 milliGRAM(s) IV Push every 8 hours PRN Nausea and/or Vomiting      lanolin topical (Rash)      LABS                        7.6    7.35  )-----------( 244      ( 25 Feb 2023 06:56 )             25.7     02-25    136  |  99  |  8   ----------------------------<  105<H>  3.6   |  31  |  0.67    Ca    8.9      25 Feb 2023 06:56  Phos  2.8     02-25  Mg     1.8     02-25    TPro  4.7<L>  /  Alb  2.3<L>  /  TBili  <0.2  /  DBili  x   /  AST  26  /  ALT  36  /  AlkPhos  143<H>  02-25                IMAGING/EKG/ETC  Reviewed.

## 2023-02-26 NOTE — PROGRESS NOTE ADULT - PROBLEM SELECTOR PLAN 5
On home losartan 100 mg QD.  Plan:  - Blood pressure well controlled at this time.   - restart as clinically indicated

## 2023-02-26 NOTE — PROGRESS NOTE ADULT - PROBLEM SELECTOR PLAN 4
MCV 94 2/2 to active hematemesis i/s/o known esophageal ulcer. Iron 20, Ferritin 154, total binding capacity 164, %sat 12. Likely 2/2 mixed picture of iron deficiency w/ possible concurrent anemia of chronic disease. Hgb remains stable   Plan:  - c/w IV iron 200mg qd for 5 days   - Trend CBC  - Maintain active T&S (order for 2/18)  - transfuse if Hgb <7

## 2023-02-26 NOTE — PROGRESS NOTE ADULT - SUBJECTIVE AND OBJECTIVE BOX
*****************Stepdown from [facility] to [facility]***************  Hospital Course: This is a 77F PMHx of hiatal hernia, esophageal ulcer (diagnosed at [facility] in 1/2023), HLD, depression (on aripiprazole and paroxetine), and seizure disorder (last seizure 2 years ago, on lamotrigine), presented to [facility] ED after 6 days of dark black emesis with symptoms concerning for aspiration PNA on chest x-ray. She reports that the symptoms began after drinking a glass of wine, and have persisted. Of note, patient presented to [facility] with similar symptoms and hospital stay was prolonged due to weaning off supplemental oxygen. GI consulted, patient was intubated for EGD, found to have grade D esophagitis, ulceration, and a large hiatal hernia. Patient was extubated to Non-rebreather and was unable to wean. ICU was consulted for AHRF, transferred to telemetry and placed on HFNC. In [facility] she continued to spike fevers intermittently with the last fever on 02/21, her oxygen requirement has also steadily increased despite being on zosyn 4.5g q8hrs. Bedside POCUS showed RV dilation initially concerning for volume overload. She was diuresed but patient's respiratory status did not improve, CTPE performed which showed b/l pulmonary emboli with saddle embolus on the right with extension into upper and lower lobe pulmonary arteries and LE doppler with Deep venous thrombosis in bilateral calf veins. Patient was started on heparin drip and PERT team and structural heart were consulted with tentative plan for thrombectomy vs IVC filter placement. Patient was continued on therapeutic lovenox and ultimately did not for a thrombectomy. She was started on IV Fe Sucrose for Fe Deficiency Anemia. Patient was continued on her home meds. Patient is medically stable for discharge to the [facility].     INTERVAL HPI/OVERNIGHT EVENTS:  Patient was seen and examined at bedside. Case discussed with attending physician.     As per nurse and patient, patient reports that she feels well at this time. Patient      VITAL SIGNS:  T(F): 98.1 (02-26-23 @ 14:00)  HR: 76 (02-26-23 @ 16:36)  BP: 120/58 (02-26-23 @ 16:36)  RR: 18 (02-26-23 @ 16:36)  SpO2: 96% (02-26-23 @ 16:36)  Wt(kg): --    PHYSICAL EXAM:    Constitutional: No acute distress, resting comfortably in bed.    HEENT: Pupils equal round and reactive to light, EOMI, sclera non-icteric, neck supple, trachea midline, no masses, no JVD, MMM, good dentition  Respiratory: Clear to ausculatation bilaterally. good air entry, no wheezing, no rhonchi, no rales, without accessory muscle use and no intercostal retractions  Cardiovascular: Regular rate and rhythm, normal S1S2, no murmurs, rubs, gallops.  Gastrointestinal: soft, non-tender and non-distended, no masses palpable, Normoactive bowel sounds.  Extremities: Warm, well perfused, pulses equal bilateral upper and lower extremities, no edema, no clubbing  Neurological: AAOx3, CN Grossly intact  Skin: Normal temperature, warm, dry.    MEDICATIONS  (STANDING):  ARIPiprazole 2 milliGRAM(s) Oral every 24 hours  enoxaparin Injectable 85 milliGRAM(s) SubCutaneous every 12 hours  iron sucrose IVPB 200 milliGRAM(s) IV Intermittent every 24 hours  lamoTRIgine 100 milliGRAM(s) Oral every 24 hours  pantoprazole    Tablet 40 milliGRAM(s) Oral every 12 hours  PARoxetine 30 milliGRAM(s) Oral every 24 hours  sucralfate suspension 1 Gram(s) Oral every 6 hours    MEDICATIONS  (PRN):  acetaminophen     Tablet .. 650 milliGRAM(s) Oral every 6 hours PRN Temp greater or equal to 38C (100.4F)  ondansetron Injectable 4 milliGRAM(s) IV Push every 8 hours PRN Nausea and/or Vomiting      Allergies    lanolin topical (Rash)    Intolerances        LABS:                        7.6    7.39  )-----------( 322      ( 26 Feb 2023 05:30 )             24.8     02-26    136  |  99  |  6<L>  ----------------------------<  109<H>  4.2   |  29  |  0.59    Ca    8.8      26 Feb 2023 05:30  Phos  2.2     02-26  Mg     2.0     02-26    TPro  5.0<L>  /  Alb  2.4<L>  /  TBili  <0.2  /  DBili  x   /  AST  32  /  ALT  42  /  AlkPhos  165<H>  02-26            RADIOLOGY & ADDITIONAL TESTS:  Reviewed   *****************Stepdown from [facility] to [facility]***************  Hospital Course: This is a 77F PMHx of hiatal hernia, esophageal ulcer (diagnosed at [facility] in 1/2023), HLD, depression (on aripiprazole and paroxetine), and seizure disorder (last seizure 2 years ago, on lamotrigine), presented to [facility] ED after 6 days of dark black emesis with symptoms concerning for aspiration PNA on chest x-ray. She reports that the symptoms began after drinking a glass of wine, and have persisted. Of note, patient presented to [facility] with similar symptoms and hospital stay was prolonged due to weaning off supplemental oxygen. GI consulted, patient was intubated for EGD, found to have grade D esophagitis, ulceration, and a large hiatal hernia. Patient was extubated to Non-rebreather and was unable to wean. ICU was consulted for AHRF, transferred to telemetry and placed on HFNC. In [place] she continued to spike fevers intermittently with the last fever on 02/21, her oxygen requirement has also steadily increased despite being on zosyn 4.5g q8hrs. Bedside POCUS showed RV dilation initially concerning for volume overload. She was diuresed but patient's respiratory status did not improve, CTPE performed which showed b/l pulmonary emboli with saddle embolus on the right with extension into upper and lower lobe pulmonary arteries and LE doppler with Deep venous thrombosis in bilateral calf veins. Patient was started on heparin drip and PERT team and structural heart were consulted with tentative plan for thrombectomy vs IVC filter placement. Patient was transitioned to therapeutic lovenox and ultimately did not undergo any intervention. She was started on IV Fe Sucrose for Iron Deficiency Anemia. Patient was continued on her home meds. Patient is medically stable for discharge to the [facility].     INTERVAL HPI/OVERNIGHT EVENTS:  Patient was seen and examined at bedside. Case discussed with attending physician.     As per nurse and patient, patient reports that she feels well at this time. Patient states that her breathing feels stable at this time. Patient denies fewver, chills, chest pain, abdominal pain, nausea, vomiting, dysuria, constipation, diarrhea at this time. Pateint denies shortness of breath but was unable to be weaned off oxygen or to 3L.     VITAL SIGNS:  T(F): 98.1 (02-26-23 @ 14:00)  HR: 76 (02-26-23 @ 16:36)  BP: 120/58 (02-26-23 @ 16:36)  RR: 18 (02-26-23 @ 16:36)  SpO2: 96% (02-26-23 @ 16:36)  Wt(kg): --    PHYSICAL EXAM:    Constitutional: Thin female with no acute distress, resting comfortably in bed.    HEENT: Sclera non-icteric, neck supple, MMM, nasal canula in place.   Respiratory: Clear to auscultation bilaterally, adequate air entry, no wheezing, no rhonchi, no rales, without accessory muscle use and no intercostal retractions.  Cardiovascular: Regular rate and rhythm, normal S1S2.  Gastrointestinal: soft, non-tender and non-distended, Normoactive bowel sounds.  Extremities: Warm, well perfused, pulses equal bilateral upper and lower extremities, trace lower extremity edema.   Neurological: AAOx3, answers questions appropriately.   Skin: Normal temperature, warm, dry.    MEDICATIONS  (STANDING):  ARIPiprazole 2 milliGRAM(s) Oral every 24 hours  enoxaparin Injectable 85 milliGRAM(s) SubCutaneous every 12 hours  iron sucrose IVPB 200 milliGRAM(s) IV Intermittent every 24 hours  lamoTRIgine 100 milliGRAM(s) Oral every 24 hours  pantoprazole    Tablet 40 milliGRAM(s) Oral every 12 hours  PARoxetine 30 milliGRAM(s) Oral every 24 hours  sucralfate suspension 1 Gram(s) Oral every 6 hours    MEDICATIONS  (PRN):  acetaminophen     Tablet .. 650 milliGRAM(s) Oral every 6 hours PRN Temp greater or equal to 38C (100.4F)  ondansetron Injectable 4 milliGRAM(s) IV Push every 8 hours PRN Nausea and/or Vomiting      Allergies    lanolin topical (Rash)    Intolerances        LABS:                        7.6    7.39  )-----------( 322      ( 26 Feb 2023 05:30 )             24.8     02-26    136  |  99  |  6<L>  ----------------------------<  109<H>  4.2   |  29  |  0.59    Ca    8.8      26 Feb 2023 05:30  Phos  2.2     02-26  Mg     2.0     02-26    TPro  5.0<L>  /  Alb  2.4<L>  /  TBili  <0.2  /  DBili  x   /  AST  32  /  ALT  42  /  AlkPhos  165<H>  02-26            RADIOLOGY & ADDITIONAL TESTS:  Reviewed

## 2023-02-26 NOTE — PROGRESS NOTE ADULT - PROBLEM SELECTOR PLAN 2
Presented with UGIB w/ associated coffee-ground emesis. Pt w/ known hx of esophageal ulcer, diagnosed at [facility] in 1/2023, discharged on carafate and protonix with surgery f/u for hiatal hernia repair. Presented w/ 6 days of dark black emesis. No signs of active bleeding currently, Hgb stable. GI consulted, endoscopy done showing large sized paraesophageal hiatal hernia, Grade D esophagitis with no bleeding was seen starting at 30 cm from the incisors in the lower third of the esophagus and middle third of the esophagus, compatible with nonspecific erosive esophagitis. This is likely the source of coffee ground emesis. Patchy erythema of the mucosa with no bleeding was noted in the stomach body. These findings are compatible with non-erosive gastritis  Plan:  - Pantoprazole 40 mg PO BID x 2 weeks then daily  - Carafate Suspension 1g po qid  x 2 weeks

## 2023-02-27 DIAGNOSIS — R52 PAIN, UNSPECIFIED: ICD-10-CM

## 2023-02-27 DIAGNOSIS — R05.8 OTHER SPECIFIED COUGH: ICD-10-CM

## 2023-02-27 DIAGNOSIS — K92.0 HEMATEMESIS: ICD-10-CM

## 2023-02-27 DIAGNOSIS — R19.7 DIARRHEA, UNSPECIFIED: ICD-10-CM

## 2023-02-27 DIAGNOSIS — Z51.5 ENCOUNTER FOR PALLIATIVE CARE: ICD-10-CM

## 2023-02-27 DIAGNOSIS — Z71.89 OTHER SPECIFIED COUNSELING: ICD-10-CM

## 2023-02-27 DIAGNOSIS — M54.9 DORSALGIA, UNSPECIFIED: ICD-10-CM

## 2023-02-27 LAB
ALBUMIN SERPL ELPH-MCNC: 2.5 G/DL — LOW (ref 3.3–5)
ALP SERPL-CCNC: 145 U/L — HIGH (ref 40–120)
ALT FLD-CCNC: 36 U/L — SIGNIFICANT CHANGE UP (ref 10–45)
ANION GAP SERPL CALC-SCNC: 5 MMOL/L — SIGNIFICANT CHANGE UP (ref 5–17)
AST SERPL-CCNC: 23 U/L — SIGNIFICANT CHANGE UP (ref 10–40)
BASOPHILS # BLD AUTO: 0.06 K/UL — SIGNIFICANT CHANGE UP (ref 0–0.2)
BASOPHILS NFR BLD AUTO: 0.9 % — SIGNIFICANT CHANGE UP (ref 0–2)
BILIRUB SERPL-MCNC: <0.2 MG/DL — SIGNIFICANT CHANGE UP (ref 0.2–1.2)
BUN SERPL-MCNC: 6 MG/DL — LOW (ref 7–23)
CALCIUM SERPL-MCNC: 8.9 MG/DL — SIGNIFICANT CHANGE UP (ref 8.4–10.5)
CHLORIDE SERPL-SCNC: 101 MMOL/L — SIGNIFICANT CHANGE UP (ref 96–108)
CO2 SERPL-SCNC: 32 MMOL/L — HIGH (ref 22–31)
CREAT SERPL-MCNC: 0.66 MG/DL — SIGNIFICANT CHANGE UP (ref 0.5–1.3)
EGFR: 89 ML/MIN/1.73M2 — SIGNIFICANT CHANGE UP
EOSINOPHIL # BLD AUTO: 0.25 K/UL — SIGNIFICANT CHANGE UP (ref 0–0.5)
EOSINOPHIL NFR BLD AUTO: 3.9 % — SIGNIFICANT CHANGE UP (ref 0–6)
GGT SERPL-CCNC: 69 U/L — HIGH (ref 8–40)
GLUCOSE SERPL-MCNC: 100 MG/DL — HIGH (ref 70–99)
HCT VFR BLD CALC: 25.1 % — LOW (ref 34.5–45)
HGB BLD-MCNC: 7.7 G/DL — LOW (ref 11.5–15.5)
IMM GRANULOCYTES NFR BLD AUTO: 0.5 % — SIGNIFICANT CHANGE UP (ref 0–0.9)
LYMPHOCYTES # BLD AUTO: 0.86 K/UL — LOW (ref 1–3.3)
LYMPHOCYTES # BLD AUTO: 13.6 % — SIGNIFICANT CHANGE UP (ref 13–44)
MAGNESIUM SERPL-MCNC: 1.9 MG/DL — SIGNIFICANT CHANGE UP (ref 1.6–2.6)
MCHC RBC-ENTMCNC: 29.1 PG — SIGNIFICANT CHANGE UP (ref 27–34)
MCHC RBC-ENTMCNC: 30.7 GM/DL — LOW (ref 32–36)
MCV RBC AUTO: 94.7 FL — SIGNIFICANT CHANGE UP (ref 80–100)
MONOCYTES # BLD AUTO: 0.44 K/UL — SIGNIFICANT CHANGE UP (ref 0–0.9)
MONOCYTES NFR BLD AUTO: 7 % — SIGNIFICANT CHANGE UP (ref 2–14)
NEUTROPHILS # BLD AUTO: 4.69 K/UL — SIGNIFICANT CHANGE UP (ref 1.8–7.4)
NEUTROPHILS NFR BLD AUTO: 74.1 % — SIGNIFICANT CHANGE UP (ref 43–77)
NRBC # BLD: 0 /100 WBCS — SIGNIFICANT CHANGE UP (ref 0–0)
PHOSPHATE SERPL-MCNC: 2.8 MG/DL — SIGNIFICANT CHANGE UP (ref 2.5–4.5)
PLATELET # BLD AUTO: 347 K/UL — SIGNIFICANT CHANGE UP (ref 150–400)
POTASSIUM SERPL-MCNC: 4.1 MMOL/L — SIGNIFICANT CHANGE UP (ref 3.5–5.3)
POTASSIUM SERPL-SCNC: 4.1 MMOL/L — SIGNIFICANT CHANGE UP (ref 3.5–5.3)
PROT SERPL-MCNC: 4.8 G/DL — LOW (ref 6–8.3)
RBC # BLD: 2.65 M/UL — LOW (ref 3.8–5.2)
RBC # FLD: 15.6 % — HIGH (ref 10.3–14.5)
SODIUM SERPL-SCNC: 138 MMOL/L — SIGNIFICANT CHANGE UP (ref 135–145)
WBC # BLD: 6.33 K/UL — SIGNIFICANT CHANGE UP (ref 3.8–10.5)
WBC # FLD AUTO: 6.33 K/UL — SIGNIFICANT CHANGE UP (ref 3.8–10.5)

## 2023-02-27 PROCEDURE — 99223 1ST HOSP IP/OBS HIGH 75: CPT

## 2023-02-27 PROCEDURE — 99233 SBSQ HOSP IP/OBS HIGH 50: CPT | Mod: GC

## 2023-02-27 PROCEDURE — 99232 SBSQ HOSP IP/OBS MODERATE 35: CPT

## 2023-02-27 PROCEDURE — 99497 ADVNCD CARE PLAN 30 MIN: CPT | Mod: 25

## 2023-02-27 RX ORDER — LIDOCAINE 4 G/100G
1 CREAM TOPICAL DAILY
Refills: 0 | Status: DISCONTINUED | OUTPATIENT
Start: 2023-02-27 | End: 2023-02-28

## 2023-02-27 RX ADMIN — PANTOPRAZOLE SODIUM 40 MILLIGRAM(S): 20 TABLET, DELAYED RELEASE ORAL at 18:15

## 2023-02-27 RX ADMIN — Medication 1 GRAM(S): at 19:42

## 2023-02-27 RX ADMIN — ENOXAPARIN SODIUM 85 MILLIGRAM(S): 100 INJECTION SUBCUTANEOUS at 00:17

## 2023-02-27 RX ADMIN — PANTOPRAZOLE SODIUM 40 MILLIGRAM(S): 20 TABLET, DELAYED RELEASE ORAL at 06:20

## 2023-02-27 RX ADMIN — Medication 1 GRAM(S): at 08:33

## 2023-02-27 RX ADMIN — LAMOTRIGINE 100 MILLIGRAM(S): 25 TABLET, ORALLY DISINTEGRATING ORAL at 09:44

## 2023-02-27 RX ADMIN — Medication 1 APPLICATION(S): at 06:20

## 2023-02-27 RX ADMIN — Medication 1 GRAM(S): at 14:33

## 2023-02-27 RX ADMIN — Medication 1 APPLICATION(S): at 19:04

## 2023-02-27 RX ADMIN — LIDOCAINE 1 PATCH: 4 CREAM TOPICAL at 19:38

## 2023-02-27 RX ADMIN — ENOXAPARIN SODIUM 85 MILLIGRAM(S): 100 INJECTION SUBCUTANEOUS at 12:20

## 2023-02-27 RX ADMIN — IRON SUCROSE 110 MILLIGRAM(S): 20 INJECTION, SOLUTION INTRAVENOUS at 14:33

## 2023-02-27 RX ADMIN — Medication 30 MILLIGRAM(S): at 09:45

## 2023-02-27 RX ADMIN — LIDOCAINE 1 PATCH: 4 CREAM TOPICAL at 14:31

## 2023-02-27 RX ADMIN — ARIPIPRAZOLE 2 MILLIGRAM(S): 15 TABLET ORAL at 16:39

## 2023-02-27 NOTE — CONSULT NOTE ADULT - PROBLEM SELECTOR RECOMMENDATION 6
Complex medical decision making / symptom management in the setting of chronic pain, and aspiration pna    Will continue to follow for ongoing GOC discussion and support. Emotional support provided, questions answered.  Active Psychosocial Referrals:  [x]Social Work/Case management [x]PT/OT []Chaplaincy []Hospice  []Patient/Family Support []Holistic RN []Massage Therapy []Ethics  Coping: [x] well [] with difficulty [] poor coping [] unable to assess  Support system: [] strong [x] adequate [] inadequate    For new or uncontrolled symptoms, please call Palliative Care at [phone]. The service is available 24/7 (including nights & weekends) to provide symptom management recommendations over the phone as appropriate.

## 2023-02-27 NOTE — CONSULT NOTE ADULT - ATTENDING COMMENTS
Pt w/ recently diagnosed esophageal ulcer on EGD at [facility], p/w multiple days of dark vomit and anemia.   S/p EGD - grade D esophagitis and retained food in stomach, as well as large hiatal hernia. Suspect esophagitis from recurrent vomiting from possible gastroparesis/large hiatal hernia.  Recommend PPI BID, carafate, small frequent meals, CT surgery eval for HH, outpatient nuclear medicine gastric emptying study for gastroparesis.
78 yo F with hiatal hernia admitted with UGIB and aspiration pneumonia. worsening hypoxia post EGD likely 2/2 aspiration. broaden antibiotic coverage. HFNC as tolerated to maintin sats >90%. keep NPO for tonight. DNR/DNI. admit to tele.
78yo F with PMH of Hiatal Hernia, Depression, and Seizure Disorder p/w hematemesis in the setting of GIB. Clinically improving and pending PRISCILLA placement. Patient is overtly comfortable but offers complaints of chronic joint/back pain. Symptom management recommendations noted above. Goals are established, reaffirmed and documented. Patient does not have a hospice qualifying diagnosis.    In addition to the E/M visit, an advance care planning meeting was performed. Start time: 3:40PM; End time: 3:56PM; Total time: 16min. A face to face meeting to discuss advance care planning was held today regarding: NATHEN CHOW. Primary decision maker: Patient is able to participate in decision making; Alternate/surrogate: . Discussed advance directives including, but not limited to, healthcare proxy and code status. Decision regarding code status: DNR/DNI; Documentation completed today: [facility] note

## 2023-02-27 NOTE — CONSULT NOTE ADULT - ASSESSMENT
78 y/o F PMH of hiatal hernia, esophageal ulcer (diagnosed at [facility] in 1/2023), HLD, depression (on aripiprazole and paroxetine), and seizure disorder (last seizure 2 years ago, on lamotrigine), p/w 6 days of dark black emesis admitted for GIB w/u w course c/b AHRF 2/2 aspiration PNA and PE. She is no longer actively bleeding, respiratory status improved, pending d/c to [facility]. She is a STAR pt, palliative auto consulted.

## 2023-02-27 NOTE — CHART NOTE - NSCHARTNOTEFT_GEN_A_CORE
[facility] Reference #: [id number]    Rx Written	Rx Dispensed	Drug	Quantity	Days Supply	Prescriber Name	Prescriber Aniya #	Payment Method	Dispenser  01/25/2023	02/02/2023	tereazepam 1 mg tablet	30	30	ElaineShaji MD	[id number]	Insurance	[employer] #[id number]  11/15/2022	11/20/2022	lorazepam 1 mg tablet	30	30	ElaineShaji MD	[id number]	Insurance	[employer] #[id number]  05/16/2022	05/24/2022	lorazepam 1 mg tablet	30	30	ElaineShaji MD	[id number]	Insurance	[employer] #[id number]  03/23/2022	03/30/2022	lorazepam 1 mg tablet	30	30	ElaineShaji MD	[id number]	Insurance	[employer] #[id number]

## 2023-02-27 NOTE — CONSULT NOTE ADULT - CONVERSATION DETAILS
Confirmed that patient is DNR/DNI. HCP is daughter Tracy Heredia, though she does not have the HCP form here.

## 2023-02-27 NOTE — PROGRESS NOTE ADULT - PROBLEM SELECTOR PLAN 4
On home Abilify 2 mg QD, Paxil 40 qd, and lorazepam 1g PRN.  Plan:  - Continue home Abilify and Paxil.   - Hold lorazepam.    # Seizure Disorder: Last episode 2 years prior, on home lamotrigine.   - Continue home dose of lamotrigine.

## 2023-02-27 NOTE — PROGRESS NOTE ADULT - ATTENDING COMMENTS
Hb stable, no overt bleeding. Tolerating regular diet.  C/w BID PPI and carafate x 2 weeks.  Smaller meals for suspected gastroparesis.  Outpatient GI evaluation for gastroparesis.  CT surgery eval for HH intervention.    GI will sign off. Please call us back as needed.
#GIB  #nausea with coffee ground emesis   #symptomatic anemia   #hx of esophageal Ulcers   #severe sepsis 2/2 suspected asp pna  #MARYA   #epilepsy   #hiatal hernia   #dvt ppx   #gi ppx   #GOC: DNR/DNI     hgb 9 on admission -> trended down 7.8->   s/p 2 L ivf, keep active type n screen, NPO for now, with PPI IV BID, will add carafate when tolerating po   denies using nsaids, asa, AC, recent admission at [facility] in jan-will obtain collaterals in am   GI consulted pt went for EGD which showed esophageal ulcers, large hiatal hernia   severe sepsis with lactate > 2.5 -> trended down to .9 after ivf, wbc 11.4 ( < 12k), bp 87/62 on admission   EKG with TWI in v5-6, will obtain collateral to compare old ekg, trops w mild elevation   asiration pna - was on rocephin-> escalated to zosyn after egd-> worsning hypoxia   will cw epilepsy medications   asp precaution, fu s/s when pt is not npo   dvt ppx w scd and GI ppx w PPI  transfer to [facility] for respiratory failure requiring nrb/HF
76 yo F with hiatal hernia admitted with UGIB and aspiration pneumonia. worsening hypoxia post EGD likely 2/2 aspiration. initially improvement in oxygen requirements, today worsened now 70%/50lpn HFNC. on bedside POCUS has b-lines b/l. trial of lasix 20mg.
78 yo F initially admitted for UGIB and aspiration pna, hospital course c/b acute PE and worsening AHRF, now significantly improved s/b abx and anticoagulation, slowly weaned off HFNC and tolerating nasal cannula since yesterday, completed antibiotics. Remains at risk for recurrent aspiration given known hiatal hernia, recommend out patient follow up. Persistent anemia, mixed picture but w/ significant iron deficiency, now on IV iron. Stable to be transferred to [facility]. PT, OT, OOBTC, IC.
78 yo F with hiatal hernia admitted with UGIB and aspiration pneumonia. worsening hypoxia post EGD likely 2/2 aspiration. today, oxygenation improved. plan to advance diet. c/w PPI and carafate. wean o2 as tolerated to maintain sats >90%. PT/OT/OOB to chair.
Patient seen and examined with house-staff during bedside rounds.  Resident note read, including vitals, physical findings, laboratory data, and radiological reports.   Revisions included below.  Direct personal management at bed side and extensive interpretation of the data.  Plan was outlined and discussed in details with the housestaff.  Decision making of high complexity  Action taken for acute disease activity to reflect the level of care provided:  - medication reconciliation  - review laboratory data    PROCEDURE DATE:  02/24/2023          INTERPRETATION:  Clinical history reason for exam: Pneumonia.    Frontal chest.    COMPARISON: February 24, 2023    Findings/  impression: Stable pulmonary arterial enlargement, left opacity/pleural   effusion and cardiomegaly. Right opacity/pleural effusion, decreased.       Thoracic aortic calcification. Hiatal hernia. Stable bony structures.    < end of copied text >  Improved.  The patient will wean further on the high flow and will transition to nasal cannula.  Chest x-ray revealed improvement.  She is on Lovenox.  Out of bed in chair.  Discussed the case in details with the daughter
Seen and Examined by me on 2/27/23     Acute Hypoxic Respiratory Failure   Acute Saddle Pulmonary Embolism with Right Ventricular Strain on Echo   Gram Negative Pneumonia   Acute BLood loss Anemia due to Upper GI bleeding from an esophageal ulcer   Upper GI bleeding from an esophageal ulcer   Anxiety and Depression  HTN    Transition to Eliquis   Monitor hgb   no further bleeding   continue protonix and carafate
76 yo F initially admitted for UGIB and aspiration pna, hospital course c/b acute PE and worsening AHRF, now significantly improved s/b abx and anticoagulation, slowly weaned off HFNC and tolerating nasal cannula since yesterday, completed antibiotics. Remains at risk for recurrent aspiration given known hiatal hernia, recommend out patient follow up. Persistent anemia, mixed picture but w/ significant iron deficiency, start IV iron given infection has resolved. Will continue to monitor on nasal cannula and if remains stable can likely be transferred to [facility]. PT, OT, OOBTC, IC.
78 yo F with hiatal hernia admitted with UGIB and aspiration pneumonia. worsening hypoxia post EGD likely 2/2 aspiration. on zodyn. oxygen requirements are labile although subjectively improved today compared to yesterday. has had a good response to the diuresis, will monitor urine output. if no improvements in oxygen requirements plan for CTA r/o PE. ECHO pending.
#PE: CTA+ saddle embolus, TTE w/ moderately dilated RV with mildly reduced RV systolic function and moderate to severe TR.  currently HDS, no indication for intervention per PERT. c/w lovenox 85mg BID. Monitor resp status.    #asp pna: s/p zosyn 7 days, c/w asp precautions, wean o2 as tolerated   #GIB: Presented with UGIB w/ associated coffee-ground emesis.s/p endoscopy, c/f erosive esophagitis. c/w pantoprazole, carafate. Hgb stable. c/w iron infusions, trend cbc, monitor for bleeding
Patient seen and examined with house-staff during bedside rounds.  Resident note read, including vitals, physical findings, laboratory data, and radiological reports.   Revisions included below.  Direct personal management at bed side and extensive interpretation of the data.  Plan was outlined and discussed in details with the housestaff.  Decision making of high complexity  Action taken for acute disease activity to reflect the level of care provided:  - medication reconciliation  - review laboratory data  The patient is clinically stable.  The patient had a sat of 100% and I decreased the oxygen to 60 L on 60% oxygen.  Continue to wean.  The echo was consistent with pulmonary hypertension and acute right heart failure.  Continue anticoagulation.  Changed to Lovenox.  No indication for intervention.  Continue antibiotic for the bilateral aspiration pneumonia.  Hemoglobin is stable no evidence of GI bleed

## 2023-02-27 NOTE — PROGRESS NOTE ADULT - PROBLEM SELECTOR PLAN 2
Presented with UGIB w/ associated coffee-ground emesis. Pt w/ known hx of esophageal ulcer, diagnosed at [facility] in 1/2023, discharged on Carafate and protonix with surgery f/u for hiatal hernia repair. Presented w/ 6 days of dark black emesis. No signs of active bleeding currently, Hgb stable. GI consulted, endoscopy done showing large sized paraesophageal hiatal hernia, Grade D esophagitis with no bleeding was seen starting at 30 cm from the incisors in the lower third of the esophagus and middle third of the esophagus, compatible with nonspecific erosive esophagitis. This is likely the source of coffee ground emesis. Patchy erythema of the mucosa with no bleeding was noted in the stomach body. These findings are compatible with non-erosive gastritis    Plan:  - Pantoprazole 40 mg PO BID x 2 weeks then daily.  - Carafate Suspension 1g po qid  x 2 weeks.

## 2023-02-27 NOTE — PROGRESS NOTE ADULT - PROBLEM SELECTOR PLAN 1
Patient admitted with AHRF, treated for pneumonia with continued hypoxia. CTA Chest w/ numerous bilateral pulmonary emboli, saddle embolus on the right with extension into upper and lower lobe pulmonary arteries. LE US shows deep venous thrombosis in bilateral calf veins. TTE showing dilated right ventricular size, reduced right ventricular systolic function. Dilated right atrium. PERT team consulted but ultimately signed off with no interventions planned. No personal history or family history of any blood clots in the past, notably off AC for 5 days on admission.     Plan:  - Continue with Lovenox 1 mg/kg BID.   - Continue nasal canula for oxygen support and wean as tolerated.  - Patient will need long term discharge AC, plan to discuss doacs with patient. Patient admitted with AHRF, treated for pneumonia with continued hypoxia. CTA Chest w/ numerous bilateral pulmonary emboli, saddle embolus on the right with extension into upper and lower lobe pulmonary arteries. LE US shows deep venous thrombosis in bilateral calf veins. TTE showing dilated right ventricular size, reduced right ventricular systolic function. Dilated right atrium. PERT team consulted but ultimately signed off with no interventions planned. No personal history or family history of any blood clots in the past, notably off AC for 5 days on admission.     Plan:  - Continue with Lovenox 1 mg/kg BID.   - Continue nasal canula for oxygen support and wean as tolerated.  - Patient for doac on discharge.

## 2023-02-27 NOTE — PROGRESS NOTE ADULT - SUBJECTIVE AND OBJECTIVE BOX
INTERVAL HPI/OVERNIGHT EVENTS:  Patient was seen and examined at bedside. Case discussed with attending physician during morning rounds.     As per nurse and patient, no o/n events, patient resting comfortably. No complaints at this time. Patient denies: fever, chills, dizziness, weakness, HA, Changes in vision, CP, palpitations, SOB, cough, N/V/D/C, dysuria, changes in bowel movements, LE edema. ROS otherwise negative.    VITAL SIGNS:  T(F): 97.7 (02-27-23 @ 13:59)  HR: 79 (02-27-23 @ 13:59)  BP: 109/72 (02-27-23 @ 13:59)  RR: 18 (02-27-23 @ 13:59)  SpO2: 97% (02-27-23 @ 13:59)  Wt(kg): --    PHYSICAL EXAM:    Constitutional: No acute distress, resting comfortably in bed.    HEENT: Pupils equal round and reactive to light, EOMI, sclera non-icteric, neck supple, trachea midline, no masses, no JVD, MMM, good dentition  Respiratory: Clear to ausculatation bilaterally. good air entry, no wheezing, no rhonchi, no rales, without accessory muscle use and no intercostal retractions  Cardiovascular: Regular rate and rhythm, normal S1S2, no murmurs, rubs, gallops.  Gastrointestinal: soft, non-tender and non-distended, no masses palpable, Normoactive bowel sounds.  Extremities: Warm, well perfused, pulses equal bilateral upper and lower extremities, no edema, no clubbing  Neurological: AAOx3, CN Grossly intact  Skin: Normal temperature, warm, dry.    MEDICATIONS  (STANDING):  ARIPiprazole 2 milliGRAM(s) Oral every 24 hours  enoxaparin Injectable 85 milliGRAM(s) SubCutaneous every 12 hours  iron sucrose IVPB 200 milliGRAM(s) IV Intermittent every 24 hours  lamoTRIgine 100 milliGRAM(s) Oral every 24 hours  lidocaine   4% Patch 1 Patch Transdermal daily  pantoprazole    Tablet 40 milliGRAM(s) Oral every 12 hours  PARoxetine 30 milliGRAM(s) Oral every 24 hours  sodium phosphate 15 milliMole(s)/250 mL IVPB 15 milliMole(s) IV Intermittent once  sucralfate suspension 1 Gram(s) Oral every 6 hours  vitamin A &amp; D Ointment 1 Application(s) Topical every 12 hours    MEDICATIONS  (PRN):  acetaminophen     Tablet .. 650 milliGRAM(s) Oral every 6 hours PRN Temp greater or equal to 38C (100.4F)  ondansetron Injectable 4 milliGRAM(s) IV Push every 8 hours PRN Nausea and/or Vomiting      Allergies    lanolin topical (Rash)    Intolerances        LABS:                        7.7    6.33  )-----------( 347      ( 27 Feb 2023 05:30 )             25.1     02-27    138  |  101  |  6<L>  ----------------------------<  100<H>  4.1   |  32<H>  |  0.66    Ca    8.9      27 Feb 2023 05:30  Phos  2.8     02-27  Mg     1.9     02-27    TPro  4.8<L>  /  Alb  2.5<L>  /  TBili  <0.2  /  DBili  x   /  AST  23  /  ALT  36  /  AlkPhos  145<H>  02-27            RADIOLOGY & ADDITIONAL TESTS:  Reviewed INTERVAL HPI/OVERNIGHT EVENTS:  Patient was seen and examined at bedside. Case discussed with attending physician during morning rounds.     As per nurse and patient, no o/n events, patient resting comfortably. No complaints at this time.     VITAL SIGNS:  T(F): 97.7 (02-27-23 @ 13:59)  HR: 79 (02-27-23 @ 13:59)  BP: 109/72 (02-27-23 @ 13:59)  RR: 18 (02-27-23 @ 13:59)  SpO2: 97% (02-27-23 @ 13:59)  Wt(kg): --    PHYSICAL EXAM:  Constitutional: Thin female with no acute distress, resting comfortably in bed.    HEENT: Sclera non-icteric, neck supple, MMM, nasal canula in place.   Respiratory: Clear to auscultation bilaterally, adequate air entry, no wheezing, no rhonchi, no rales, without accessory muscle use and no intercostal retractions.  Cardiovascular: Regular rate and rhythm, normal S1S2.  Gastrointestinal: soft, non-tender and non-distended, Normoactive bowel sounds.  Extremities: Warm, well perfused, pulses equal bilateral upper and lower extremities, trace lower extremity edema.   Neurological: AAOx3, answers questions appropriately.   Skin: Normal temperature, warm, dry.    MEDICATIONS  (STANDING):  ARIPiprazole 2 milliGRAM(s) Oral every 24 hours  enoxaparin Injectable 85 milliGRAM(s) SubCutaneous every 12 hours  iron sucrose IVPB 200 milliGRAM(s) IV Intermittent every 24 hours  lamoTRIgine 100 milliGRAM(s) Oral every 24 hours  lidocaine   4% Patch 1 Patch Transdermal daily  pantoprazole    Tablet 40 milliGRAM(s) Oral every 12 hours  PARoxetine 30 milliGRAM(s) Oral every 24 hours  sodium phosphate 15 milliMole(s)/250 mL IVPB 15 milliMole(s) IV Intermittent once  sucralfate suspension 1 Gram(s) Oral every 6 hours  vitamin A &amp; D Ointment 1 Application(s) Topical every 12 hours    MEDICATIONS  (PRN):  acetaminophen     Tablet .. 650 milliGRAM(s) Oral every 6 hours PRN Temp greater or equal to 38C (100.4F)  ondansetron Injectable 4 milliGRAM(s) IV Push every 8 hours PRN Nausea and/or Vomiting      Allergies    lanolin topical (Rash)    Intolerances        LABS:                        7.7    6.33  )-----------( 347      ( 27 Feb 2023 05:30 )             25.1     02-27    138  |  101  |  6<L>  ----------------------------<  100<H>  4.1   |  32<H>  |  0.66    Ca    8.9      27 Feb 2023 05:30  Phos  2.8     02-27  Mg     1.9     02-27    TPro  4.8<L>  /  Alb  2.5<L>  /  TBili  <0.2  /  DBili  x   /  AST  23  /  ALT  36  /  AlkPhos  145<H>  02-27            RADIOLOGY & ADDITIONAL TESTS:  Reviewed

## 2023-02-27 NOTE — PROGRESS NOTE ADULT - PROBLEM SELECTOR PLAN 3
The echocardiogram revealed right heart dilatation related to right ventricular strain from the pulmonary hypertension.  We will follow repeat echocardiogram next week 100

## 2023-02-27 NOTE — CONSULT NOTE ADULT - PROBLEM SELECTOR RECOMMENDATION 4
2/2 esophagitis w nonbleeding ulcer, hiatal hernia found on EGD. Hgb stable, HD stable, clinically not actively bleeding. On protonix BID and sucralfate. No abd pain, dysphagia, N/V.  - c/w PPI and and sucralfate per primary team and GI

## 2023-02-27 NOTE — PROGRESS NOTE ADULT - PROBLEM SELECTOR PLAN 6
On home atorvastatin 20 mg  - c/w home meds    # Elevated Alkaline Phosphatase: Normal alk phos and LFTs on admission, uptrend on he 20th prior to diagnosis of PE. Patient found with PE, treated with AC. Uptrend likely in the setting of hepatic congestion secondary to right heart strain, transaminitis downtrended and potentially alk phos is lagging.

## 2023-02-27 NOTE — PROGRESS NOTE ADULT - PROBLEM SELECTOR PLAN 2
The patient is clinically stable.  Continue anticoagulation changed to Lovenox.  The oxygen requirement decreased.  No intervention at this point  she is on apixaban and continue as instructed for 7 days then change to 5 mg BID

## 2023-02-27 NOTE — CONSULT NOTE ADULT - PROBLEM SELECTOR RECOMMENDATION 2
Likely 2/2 abx. Less likely infxn as pt has no abd pain, N/V, fever, chills, or leukocytosis. No longer on abx. No melena or hematochezia.  Recommendation:  - if there is low concern for infectious etiology would try loperamide 4mg PO once followed by 2mg after loose stools, max dose 16mg/day  - consider adding psyllium husk as bulking agent
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
## 2023-02-27 NOTE — PROGRESS NOTE ADULT - PROBLEM SELECTOR PLAN 3
MCV 94 2/2 to active hematemesis i/s/o known esophageal ulcer. Iron 20, Ferritin 154, total binding capacity 164, %sat 12. Likely 2/2 mixed picture of iron deficiency w/ possible concurrent anemia of chronic disease. Hgb remains stable. Patient did not require transfusion this admission.     Plan:  - Continue IV iron 200mg qd for 5 days. - finishes 3/1.   - Trend CBC.  - Maintain active T&S - Last 2/24.   - transfuse if Hgb <7.

## 2023-02-27 NOTE — CONSULT NOTE ADULT - CONSULT REQUESTED DATE/TIME
17-Feb-2023 14:11
17-Feb-2023 07:10
21-Feb-2023 12:23
17-Feb-2023 08:02
21-Feb-2023 06:24
27-Feb-2023 14:03

## 2023-02-27 NOTE — PROGRESS NOTE ADULT - SUBJECTIVE AND OBJECTIVE BOX
Interval Events: Reviewed  Patient seen and examined at bedside.    Patient is a 77y old  Female who presents with a chief complaint of Aspiration PNA, hematemesis w/ known esophageal ulcer (27 Feb 2023 14:43)  she is feeling better    PAST MEDICAL & SURGICAL HISTORY:  Hiatal hernia      High cholesterol      History of esophageal ulcer      HTN (hypertension)      Major depression      Seizure disorder      Anxiety      No significant past surgical history          MEDICATIONS:  Pulmonary:    Antimicrobials:    Anticoagulants:  enoxaparin Injectable 85 milliGRAM(s) SubCutaneous every 12 hours    Cardiac:      Allergies    lanolin topical (Rash)    Intolerances        Vital Signs Last 24 Hrs  T(C): 36.7 (27 Feb 2023 20:50), Max: 36.7 (27 Feb 2023 06:07)  T(F): 98 (27 Feb 2023 20:50), Max: 98 (27 Feb 2023 06:07)  HR: 77 (27 Feb 2023 20:50) (66 - 79)  BP: 109/67 (27 Feb 2023 20:50) (109/67 - 125/73)  BP(mean): --  RR: 18 (27 Feb 2023 20:50) (18 - 18)  SpO2: 92% (27 Feb 2023 20:50) (92% - 97%)    Parameters below as of 27 Feb 2023 20:50  Patient On (Oxygen Delivery Method): nasal cannula w/ humidification  O2 Flow (L/min): 4      02-26 @ 07:01  -  02-27 @ 07:00  --------------------------------------------------------  IN: 0 mL / OUT: 500 mL / NET: -500 mL          Review of Systems:   •	General: negative  •	Skin/Breast: negative  •	Ophthalmologic: negative  •	ENMT: negative  •	Respiratory and Thorax: negative  •	Cardiovascular: negative  •	Gastrointestinal: negative  •	Genitourinary: negative  •	Musculoskeletal: negative  •	Neurological: negative  •	Psychiatric: negative  •	Hematology/Lymphatics: negative  •	Endocrine: negative  •	Allergic/Immunologic: negative    Physical Exam:   • Constitutional:	refer to the dietion /Nutritionist note  • Eyes:	EOMI; PERRL; no drainage or redness  • ENMT:	No oral lesions; no gross abnormalities  • Neck	No bruits; no thyromegaly or nodules  • Breasts:	not examined  • Back:	No deformity or limitation of movement  • Respiratory:	Breath Sounds equal & clear to percussion & auscultation, no accessory muscle use  • Cardiovascular:	Regular rate & rhythm, normal S1, S2; no murmurs, gallops or rubs; no S3, S4  • Gastrointestinal:	Soft, non-tender, no hepatosplenomegaly, normal bowel sounds  • Genitourinary:	not examined  • Rectal: not examined  • Extremities:	No cyanosis, clubbing or edema  • Vascular:	Equal and normal pulses (carotid, femoral, dorsalis pedis)  • Neurologica:l	not examined  • Skin:	No lesions; no rash  • Lymph Nodes:	No lymphadedenopathy  • Musculoskeletal:	No joint pain, swelling or deformity; no limitation of movement        LABS:      CBC Full  -  ( 27 Feb 2023 05:30 )  WBC Count : 6.33 K/uL  RBC Count : 2.65 M/uL  Hemoglobin : 7.7 g/dL  Hematocrit : 25.1 %  Platelet Count - Automated : 347 K/uL  Mean Cell Volume : 94.7 fl  Mean Cell Hemoglobin : 29.1 pg  Mean Cell Hemoglobin Concentration : 30.7 gm/dL  Auto Neutrophil # : 4.69 K/uL  Auto Lymphocyte # : 0.86 K/uL  Auto Monocyte # : 0.44 K/uL  Auto Eosinophil # : 0.25 K/uL  Auto Basophil # : 0.06 K/uL  Auto Neutrophil % : 74.1 %  Auto Lymphocyte % : 13.6 %  Auto Monocyte % : 7.0 %  Auto Eosinophil % : 3.9 %  Auto Basophil % : 0.9 %    02-27    138  |  101  |  6<L>  ----------------------------<  100<H>  4.1   |  32<H>  |  0.66    Ca    8.9      27 Feb 2023 05:30  Phos  2.8     02-27  Mg     1.9     02-27    TPro  4.8<L>  /  Alb  2.5<L>  /  TBili  <0.2  /  DBili  x   /  AST  23  /  ALT  36  /  AlkPhos  145<H>  02-27                        RADIOLOGY & ADDITIONAL STUDIES (The following images were personally reviewed):

## 2023-02-27 NOTE — CONSULT NOTE ADULT - TIME BILLING
Patient seen and examined with house-staff during bedside rounds.  Resident note read, including vitals, physical findings, laboratory data, and radiological reports.   Revisions included below.  Direct personal management at bed side and extensive interpretation of the data.  Plan was outlined and discussed in details with the housestaff.  Decision making of high complexity  Action taken for acute disease activity to reflect the level of care provided:  - medication reconciliation  - review laboratory data      Patient is admitted with the GI bleeding.  Patient had upper endoscopy which showed erosive gastritis and esophagitis with a large hiatal hernia.  Probably the patient aspirated and the concept of upper endoscopy due to the large hiatal hernia.  Patient was tachypneic and had increased oxygen requirement CT scan revealed a saddle embolus.  Age of the embolus is undetermined undetermined.  Patient is on heparin I will observe if there is no bleeding.  Will discuss whether the patient candidate for catheter related thrombectomy due to the embolus burden.  Echocardiogram and venous Doppler.  Wean off the oxygen
Emotional Support/Supportive Care and Clarification of Potential Disease Trajectory related to GIB and advanced illness  Exploration of GOC including advanced directives such as HCP designation and code status  Assessment of Symptom [place] and Palliative regimen    Time inclusive of chart review, medication ordering, discussion with primary team, clinical documentation, and communication with family/caregiver

## 2023-02-27 NOTE — CONSULT NOTE ADULT - SUBJECTIVE AND OBJECTIVE BOX
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 2-3 episodes per day, runny, non bloody or melanotic [x]Mild []Moderate []Severe  cough: mild intermittent cough, does not keep her up at night   [x]Mild []Moderate []Severe    Other Symptoms:  [x]All other review of systems negative     Palliative Performance Status Version 2:  50%    [email or web address]    PHYSICAL EXAM:  GENERAL:  [x]Alert  [x]Oriented x   []Lethargic  []Cachexia  []Unarousable  [x]Verbal  []Non-Verbal, comfortable appearing, sitting upright in ca  Behavioral:   []Anxiety  []Delirium []Agitation [x]Cooperative  HEENT:  [x]Normal   []Dry mouth   []ET Tube/Trach  []Oral lesions, hoarse voice  PULMONARY:   [x]Clear []Tachypnea  []Audible excessive secretions   []Rhonchi        []Right []Left []Bilateral  []Crackles        []Right []Left []Bilateral  []Wheezing     []Right []Left []Bilateral  CARDIOVASCULAR:    [x]Regular []Irregular []Tachy  []Wale []Murmur []Other  GASTROINTESTINAL:  [x]Soft  []Distended   [x]+BS  [x]Non tender []Tender  []PEG []OGT/ NGT  Last BM:   GENITOURINARY:  [x]Normal [] Incontinent   []Oliguria/Anuria   []Conde, did not assess  MUSCULOSKELETAL:   []Normal   [x]Weakness  []Bed/Wheelchair bound []Edema, pain with active movement of hips, shoulders, no swelling or erythema  NEUROLOGIC:   [x]No focal deficits  []Cognitive impairment  []Dysphagia []Dysarthria []Paresis []Encephalopathic, moves all extremities spontaneously, no facial froop  SKIN:   [x]Normal   []Pressure ulcer(s)  []Rash    Vital Signs Last 24 Hrs  T(C): 36.5 (27 Feb 2023 13:59), Max: 37 (26 Feb 2023 17:54)  T(F): 97.7 (27 Feb 2023 13:59), Max: 98.6 (26 Feb 2023 17:54)  HR: 79 (27 Feb 2023 13:59) (66 - 79)  BP: 109/72 (27 Feb 2023 13:59) (93/56 - 125/73)  BP(mean): 81 (26 Feb 2023 16:36) (81 - 81)  RR: 18 (27 Feb 2023 13:59) (18 - 18)  SpO2: 97% (27 Feb 2023 13:59) (94% - 97%)    Parameters below as of 27 Feb 2023 13:59  Patient On (Oxygen Delivery Method): nasal cannula w/ humidification  O2 Flow (L/min): 4     LABS:                        7.7    6.33  )-----------( 347      ( 27 Feb 2023 05:30 )             25.1   02-27    138  |  101  |  6<L>  ----------------------------<  100<H>  4.1   |  32<H>  |  0.66    Ca    8.9      27 Feb 2023 05:30  Phos  2.8     02-27  Mg     1.9     02-27  TPro  4.8<L>  /  Alb  2.5<L>  /  TBili  <0.2  /  DBili  x   /  AST  23  /  ALT  36  /  AlkPhos  145<H>  02-27    RADIOLOGY & ADDITIONAL STUDIES:  < from: Xray Chest 1 View- PORTABLE-Routine (Xray Chest 1 View- PORTABLE-Routine .) (02.24.23 @ 11:56) >  Stable pulmonary arterial enlargement, left opacity/pleural effusion and cardiomegaly. Right opacity/pleural effusion, decreased. Thoracic aortic calcification. Hiatal hernia. Stable bony structures.

## 2023-02-27 NOTE — CONSULT NOTE ADULT - PROBLEM SELECTOR RECOMMENDATION 5
Patient is DNR/DNI  - Daughter, Tracy Heredia, is HCP but does not have document here  -hospice referral not appropriate at this time, no hospice qualifying diagnosis  -GOC above

## 2023-02-27 NOTE — PROGRESS NOTE ADULT - ASSESSMENT
78 y/o F w/ hx of hiatal hernia and esophageal ulcer, presenting with 6 days of dark black emesis, hoarseness, and anorexia, admitted for UGIB and aspiration PNA. Underwent EGD which showed a grade D esophageal ulceration, continued on PPI with stable hemoglobin. Pateint initial presentation concerning for aspiration pneumonia which was treated with a 7 day course of Zosyn. Patient with continued hypoxia and found to have numerous bilateral pulmonary emboli, saddle embolus on the right with extension into multiple upper and lower lobe pulmonary arteries with DVTs in the bilateral lower extremities. Patient is now tolerating nasal canula, weaned off high flow. Patient transferred to [facility].

## 2023-02-27 NOTE — CONSULT NOTE ADULT - REASON FOR ADMISSION
Aspiration PNA, hematemesis w/ known esophageal ulcer

## 2023-02-27 NOTE — CONSULT NOTE ADULT - PROBLEM SELECTOR RECOMMENDATION 3
2/2 aspiration PNA. She is improving from AHRF standpoint and no longer on abx. Cough is mild to moderate but doesn't disrupt seep.  Recommendation:  - robitussin or tessalon perle for cough  - management of acute respiratory w hypoxia per primary team

## 2023-02-28 ENCOUNTER — TRANSCRIPTION ENCOUNTER (OUTPATIENT)
Age: 78
End: 2023-02-28

## 2023-02-28 VITALS
HEART RATE: 62 BPM | OXYGEN SATURATION: 97 % | TEMPERATURE: 98 F | RESPIRATION RATE: 16 BRPM | DIASTOLIC BLOOD PRESSURE: 68 MMHG | SYSTOLIC BLOOD PRESSURE: 134 MMHG

## 2023-02-28 LAB
ANION GAP SERPL CALC-SCNC: 5 MMOL/L — SIGNIFICANT CHANGE UP (ref 5–17)
BLD GP AB SCN SERPL QL: NEGATIVE — SIGNIFICANT CHANGE UP
BUN SERPL-MCNC: 8 MG/DL — SIGNIFICANT CHANGE UP (ref 7–23)
CALCIUM SERPL-MCNC: 8.7 MG/DL — SIGNIFICANT CHANGE UP (ref 8.4–10.5)
CHLORIDE SERPL-SCNC: 103 MMOL/L — SIGNIFICANT CHANGE UP (ref 96–108)
CO2 SERPL-SCNC: 30 MMOL/L — SIGNIFICANT CHANGE UP (ref 22–31)
CREAT SERPL-MCNC: 0.59 MG/DL — SIGNIFICANT CHANGE UP (ref 0.5–1.3)
EGFR: 92 ML/MIN/1.73M2 — SIGNIFICANT CHANGE UP
GLUCOSE SERPL-MCNC: 110 MG/DL — HIGH (ref 70–99)
HCT VFR BLD CALC: 26.6 % — LOW (ref 34.5–45)
HGB BLD-MCNC: 7.9 G/DL — LOW (ref 11.5–15.5)
MAGNESIUM SERPL-MCNC: 1.8 MG/DL — SIGNIFICANT CHANGE UP (ref 1.6–2.6)
MCHC RBC-ENTMCNC: 28.9 PG — SIGNIFICANT CHANGE UP (ref 27–34)
MCHC RBC-ENTMCNC: 29.7 GM/DL — LOW (ref 32–36)
MCV RBC AUTO: 97.4 FL — SIGNIFICANT CHANGE UP (ref 80–100)
NRBC # BLD: 0 /100 WBCS — SIGNIFICANT CHANGE UP (ref 0–0)
PHOSPHATE SERPL-MCNC: 2.7 MG/DL — SIGNIFICANT CHANGE UP (ref 2.5–4.5)
PLATELET # BLD AUTO: 340 K/UL — SIGNIFICANT CHANGE UP (ref 150–400)
POTASSIUM SERPL-MCNC: 3.8 MMOL/L — SIGNIFICANT CHANGE UP (ref 3.5–5.3)
POTASSIUM SERPL-SCNC: 3.8 MMOL/L — SIGNIFICANT CHANGE UP (ref 3.5–5.3)
RBC # BLD: 2.73 M/UL — LOW (ref 3.8–5.2)
RBC # FLD: 15.8 % — HIGH (ref 10.3–14.5)
RH IG SCN BLD-IMP: POSITIVE — SIGNIFICANT CHANGE UP
SARS-COV-2 RNA SPEC QL NAA+PROBE: NEGATIVE — SIGNIFICANT CHANGE UP
SODIUM SERPL-SCNC: 138 MMOL/L — SIGNIFICANT CHANGE UP (ref 135–145)
WBC # BLD: 6.23 K/UL — SIGNIFICANT CHANGE UP (ref 3.8–10.5)
WBC # FLD AUTO: 6.23 K/UL — SIGNIFICANT CHANGE UP (ref 3.8–10.5)

## 2023-02-28 PROCEDURE — 97535 SELF CARE MNGMENT TRAINING: CPT

## 2023-02-28 PROCEDURE — 93970 EXTREMITY STUDY: CPT

## 2023-02-28 PROCEDURE — 84145 PROCALCITONIN (PCT): CPT

## 2023-02-28 PROCEDURE — 85045 AUTOMATED RETICULOCYTE COUNT: CPT

## 2023-02-28 PROCEDURE — 88305 TISSUE EXAM BY PATHOLOGIST: CPT

## 2023-02-28 PROCEDURE — 97166 OT EVAL MOD COMPLEX 45 MIN: CPT

## 2023-02-28 PROCEDURE — 84466 ASSAY OF TRANSFERRIN: CPT

## 2023-02-28 PROCEDURE — 82977 ASSAY OF GGT: CPT

## 2023-02-28 PROCEDURE — 87641 MR-STAPH DNA AMP PROBE: CPT

## 2023-02-28 PROCEDURE — 86901 BLOOD TYPING SEROLOGIC RH(D): CPT

## 2023-02-28 PROCEDURE — 85730 THROMBOPLASTIN TIME PARTIAL: CPT

## 2023-02-28 PROCEDURE — 71275 CT ANGIOGRAPHY CHEST: CPT

## 2023-02-28 PROCEDURE — 92526 ORAL FUNCTION THERAPY: CPT

## 2023-02-28 PROCEDURE — 84295 ASSAY OF SERUM SODIUM: CPT

## 2023-02-28 PROCEDURE — 82728 ASSAY OF FERRITIN: CPT

## 2023-02-28 PROCEDURE — 83540 ASSAY OF IRON: CPT

## 2023-02-28 PROCEDURE — 83550 IRON BINDING TEST: CPT

## 2023-02-28 PROCEDURE — 85027 COMPLETE CBC AUTOMATED: CPT

## 2023-02-28 PROCEDURE — 87070 CULTURE OTHR SPECIMN AEROBIC: CPT

## 2023-02-28 PROCEDURE — 82330 ASSAY OF CALCIUM: CPT

## 2023-02-28 PROCEDURE — 96374 THER/PROPH/DIAG INJ IV PUSH: CPT

## 2023-02-28 PROCEDURE — 86850 RBC ANTIBODY SCREEN: CPT

## 2023-02-28 PROCEDURE — 93005 ELECTROCARDIOGRAM TRACING: CPT

## 2023-02-28 PROCEDURE — 80053 COMPREHEN METABOLIC PANEL: CPT

## 2023-02-28 PROCEDURE — 87640 STAPH A DNA AMP PROBE: CPT

## 2023-02-28 PROCEDURE — 84484 ASSAY OF TROPONIN QUANT: CPT

## 2023-02-28 PROCEDURE — 83605 ASSAY OF LACTIC ACID: CPT

## 2023-02-28 PROCEDURE — 84100 ASSAY OF PHOSPHORUS: CPT

## 2023-02-28 PROCEDURE — 85610 PROTHROMBIN TIME: CPT

## 2023-02-28 PROCEDURE — 84132 ASSAY OF SERUM POTASSIUM: CPT

## 2023-02-28 PROCEDURE — 99232 SBSQ HOSP IP/OBS MODERATE 35: CPT

## 2023-02-28 PROCEDURE — 97116 GAIT TRAINING THERAPY: CPT

## 2023-02-28 PROCEDURE — 80048 BASIC METABOLIC PNL TOTAL CA: CPT

## 2023-02-28 PROCEDURE — 83735 ASSAY OF MAGNESIUM: CPT

## 2023-02-28 PROCEDURE — 85025 COMPLETE CBC W/AUTO DIFF WBC: CPT

## 2023-02-28 PROCEDURE — 97110 THERAPEUTIC EXERCISES: CPT

## 2023-02-28 PROCEDURE — 97530 THERAPEUTIC ACTIVITIES: CPT

## 2023-02-28 PROCEDURE — 99239 HOSP IP/OBS DSCHRG MGMT >30: CPT

## 2023-02-28 PROCEDURE — 99291 CRITICAL CARE FIRST HOUR: CPT

## 2023-02-28 PROCEDURE — 83880 ASSAY OF NATRIURETIC PEPTIDE: CPT

## 2023-02-28 PROCEDURE — 82803 BLOOD GASES ANY COMBINATION: CPT

## 2023-02-28 PROCEDURE — 86900 BLOOD TYPING SEROLOGIC ABO: CPT

## 2023-02-28 PROCEDURE — 97162 PT EVAL MOD COMPLEX 30 MIN: CPT

## 2023-02-28 PROCEDURE — 71045 X-RAY EXAM CHEST 1 VIEW: CPT

## 2023-02-28 PROCEDURE — 96375 TX/PRO/DX INJ NEW DRUG ADDON: CPT

## 2023-02-28 PROCEDURE — 81001 URINALYSIS AUTO W/SCOPE: CPT

## 2023-02-28 PROCEDURE — 83690 ASSAY OF LIPASE: CPT

## 2023-02-28 PROCEDURE — 87635 SARS-COV-2 COVID-19 AMP PRB: CPT

## 2023-02-28 PROCEDURE — 36415 COLL VENOUS BLD VENIPUNCTURE: CPT

## 2023-02-28 PROCEDURE — 87040 BLOOD CULTURE FOR BACTERIA: CPT

## 2023-02-28 PROCEDURE — 93306 TTE W/DOPPLER COMPLETE: CPT

## 2023-02-28 PROCEDURE — 87086 URINE CULTURE/COLONY COUNT: CPT

## 2023-02-28 RX ORDER — LAMOTRIGINE 25 MG/1
1 TABLET, ORALLY DISINTEGRATING ORAL
Qty: 0 | Refills: 0 | DISCHARGE

## 2023-02-28 RX ORDER — APIXABAN 2.5 MG/1
10 TABLET, FILM COATED ORAL EVERY 12 HOURS
Refills: 0 | Status: DISCONTINUED | OUTPATIENT
Start: 2023-02-28 | End: 2023-02-28

## 2023-02-28 RX ORDER — IRON SUCROSE 20 MG/ML
10 INJECTION, SOLUTION INTRAVENOUS
Qty: 0 | Refills: 0 | DISCHARGE
Start: 2023-02-28 | End: 2023-03-01

## 2023-02-28 RX ORDER — SUCRALFATE 1 G
1 TABLET ORAL
Qty: 0 | Refills: 0 | DISCHARGE
End: 2023-03-07

## 2023-02-28 RX ORDER — ARIPIPRAZOLE 15 MG/1
1 TABLET ORAL
Qty: 0 | Refills: 0 | DISCHARGE

## 2023-02-28 RX ORDER — SUCRALFATE 1 G
1 TABLET ORAL
Qty: 0 | Refills: 0 | DISCHARGE

## 2023-02-28 RX ORDER — LOSARTAN POTASSIUM 100 MG/1
1 TABLET, FILM COATED ORAL
Qty: 0 | Refills: 0 | DISCHARGE

## 2023-02-28 RX ORDER — ATORVASTATIN CALCIUM 80 MG/1
1 TABLET, FILM COATED ORAL
Qty: 0 | Refills: 0 | DISCHARGE

## 2023-02-28 RX ORDER — FERROUS SULFATE 325(65) MG
1 TABLET ORAL
Qty: 30 | Refills: 0
Start: 2023-02-28 | End: 2023-03-29

## 2023-02-28 RX ORDER — PANTOPRAZOLE SODIUM 20 MG/1
1 TABLET, DELAYED RELEASE ORAL
Qty: 0 | Refills: 0 | DISCHARGE

## 2023-02-28 RX ORDER — APIXABAN 2.5 MG/1
2 TABLET, FILM COATED ORAL
Qty: 0 | Refills: 0 | DISCHARGE
Start: 2023-02-28 | End: 2023-03-06

## 2023-02-28 RX ADMIN — Medication 1 GRAM(S): at 07:25

## 2023-02-28 RX ADMIN — PANTOPRAZOLE SODIUM 40 MILLIGRAM(S): 20 TABLET, DELAYED RELEASE ORAL at 07:24

## 2023-02-28 RX ADMIN — LIDOCAINE 1 PATCH: 4 CREAM TOPICAL at 02:52

## 2023-02-28 RX ADMIN — Medication 1 APPLICATION(S): at 07:25

## 2023-02-28 RX ADMIN — Medication 30 MILLIGRAM(S): at 08:55

## 2023-02-28 RX ADMIN — LIDOCAINE 1 PATCH: 4 CREAM TOPICAL at 11:48

## 2023-02-28 RX ADMIN — ENOXAPARIN SODIUM 85 MILLIGRAM(S): 100 INJECTION SUBCUTANEOUS at 00:17

## 2023-02-28 RX ADMIN — Medication 1 GRAM(S): at 02:51

## 2023-02-28 RX ADMIN — APIXABAN 10 MILLIGRAM(S): 2.5 TABLET, FILM COATED ORAL at 09:07

## 2023-02-28 RX ADMIN — LAMOTRIGINE 100 MILLIGRAM(S): 25 TABLET, ORALLY DISINTEGRATING ORAL at 08:55

## 2023-02-28 NOTE — DISCHARGE NOTE NURSING/CASE MANAGEMENT/SOCIAL WORK - NSDCPEFALRISK_GEN_ALL_CORE
For information on Fall & Injury Prevention, visit: [email or web address] OR  [email or web address] OR  https://www.cdc.gov/steadi/patient.html

## 2023-02-28 NOTE — DISCHARGE NOTE PROVIDER - HOSPITAL COURSE
78 y/o F w/ hx of hiatal hernia and esophageal ulcer, presenting with 6 days of dark black emesis, hoarseness, and anorexia, admitted for UGIB and aspiration PNA. Underwent EGD which showed a grade D esophageal ulceration, continued on PPI with stable hemoglobin. Pateint initial presentation concerning for aspiration pneumonia which was treated with a 7 day course of Zosyn. Patient with continued hypoxia and found to have numerous bilateral pulmonary emboli, saddle embolus on the right with extension into multiple upper and lower lobe pulmonary arteries with DVTs in the bilateral lower extremities. Patient is now tolerating nasal canula, weaned off high flow. Patient transferred to [facility].     # [facility]: [facility] with continued hypoxia. CTA Chest w/ numerous bilateral pulmonary emboli, saddle embolus on the right with extension into upper and lower lobe pulmonary arteries. LE US shows deep venous thrombosis in bilateral calf veins. TTE showing dilated right ventricular size, reduced right ventricular systolic function. Dilated right atrium. PERT team consulted but ultimately signed off with no interventions planned. No personal history or family history of any blood clots in the past, notably off AC for 5 days on admission. Started on Lovenox 85 mg BID for therapy before transitioning to Eliquis 10 mg BID for 7 days, planned for transition to Eliquis 5 mg BID. Plan to follow up with Dr. Montejo.     # Upper GI bleed secondary to Esophageal ulcer: Presented with UGIB w/ associated coffee-ground emesis. Pt w/ known hx of esophageal ulcer, diagnosed at [facility] in 1/2023, discharged on Carafate and protonix with surgery f/u for hiatal hernia repair. Presented w/ 6 days of dark black emesis. No signs of active bleeding currently, Hgb stable. GI consulted, endoscopy done showing large sized paraesophageal hiatal hernia, Grade D esophagitis with no bleeding was seen starting at 30 cm from the incisors in the lower third of the esophagus and middle third of the esophagus, compatible with nonspecific erosive esophagitis. This is likely the source of coffee ground emesis. Patchy erythema of the mucosa with no bleeding was noted in the stomach body. These findings are compatible with non-erosive gastritis. Pantoprazole 40 mg PO BID x 2 weeks then daily and Carafate Suspension 1g po qid  x 2 weeks.    # Acute blood loss Anemia with iron deficiency: MCV 94 2/2 to active hematemesis i/s/o known esophageal ulcer. Iron 20, Ferritin 154, total binding capacity 164, %sat 12. Likely 2/2 mixed picture of iron deficiency w/ possible concurrent anemia of chronic disease. Hgb remains stable. Patient did not require transfusion this admission. Received 4 days of iron 200 mg IV daily, plan for outpaitent     # Anxiety and depression: On home Abilify 2 mg QD, Paxil 40 qd, and lorazepam 1g PRN. Continue home Abilify and Paxil. Hold lorazepam.    # Seizure Disorder: Last episode 2 years prior, on home lamotrigine. Continue home dose of lamotrigine.    # HLD: On home atorvastatin 20 mg.     # Elevated Alkaline Phosphatase: Normal alk phos and LFTs on admission, uptrend on he 20th prior to diagnosis of PE. Patient found with PE, treated with AC. Uptrend likely in the setting of hepatic congestion secondary to right heart strain, transaminitis downtrended and potentially alk phos is lagging.    # Acute Hypoxic Respiratory Failure secondary to aspiration pneumonia and now pulmonary embolism. Aspiration pneumonia resolved, concern for HAP given recent hospitalization. Treated with Zosyn 4.5 g IV [id number]. Blood culture (2/19) NGTD and blood culture 2/16 NGTD. Wean O2 as tolerated, Incentive spirometry, OOBTC.    Patient was discharged to: [facility]  New medications: Eliquis 10 mg PO BID for 7 days then Eliquis 5 mg BID until follow up with Dr. Montejo. Iron supplementation.   Changes to old medications: Hold outpatient   Medications that were stopped: None.   Items to Follow up as outpatient   Physical exam at time of discharge: 78 y/o F w/ hx of hiatal hernia and esophageal ulcer, presenting with 6 days of dark black emesis, hoarseness, and anorexia, admitted for UGIB and aspiration PNA. Underwent EGD which showed a grade D esophageal ulceration, continued on PPI with stable hemoglobin. Pateint initial presentation concerning for aspiration pneumonia which was treated with a 7 day course of Zosyn. Patient with continued hypoxia and found to have numerous bilateral pulmonary emboli, saddle embolus on the right with extension into multiple upper and lower lobe pulmonary arteries with DVTs in the bilateral lower extremities. Patient is now tolerating nasal canula, weaned off high flow. Patient transferred to [facility].     # [facility]: [facility] with continued hypoxia. CTA Chest w/ numerous bilateral pulmonary emboli, saddle embolus on the right with extension into upper and lower lobe pulmonary arteries. LE US shows deep venous thrombosis in bilateral calf veins. TTE showing dilated right ventricular size, reduced right ventricular systolic function. Dilated right atrium. PERT team consulted but ultimately signed off with no interventions planned. No personal history or family history of any blood clots in the past, notably off AC for 5 days on admission. Started on Lovenox 85 mg BID for therapy before transitioning to Eliquis 10 mg BID for 7 days, planned for transition to Eliquis 5 mg BID. Plan to follow up with Dr. Montejo.     # Upper GI bleed secondary to Esophageal ulcer: Presented with UGIB w/ associated coffee-ground emesis. Pt w/ known hx of esophageal ulcer, diagnosed at [facility] in 1/2023, discharged on Carafate and protonix with surgery f/u for hiatal hernia repair. Presented w/ 6 days of dark black emesis. No signs of active bleeding currently, Hgb stable. GI consulted, endoscopy done showing large sized paraesophageal hiatal hernia, Grade D esophagitis with no bleeding was seen starting at 30 cm from the incisors in the lower third of the esophagus and middle third of the esophagus, compatible with nonspecific erosive esophagitis. This is likely the source of coffee ground emesis. Patchy erythema of the mucosa with no bleeding was noted in the stomach body. These findings are compatible with non-erosive gastritis. Pantoprazole 40 mg PO BID x 2 weeks then daily and Carafate Suspension 1g po qid  x 2 weeks.    # Acute blood loss Anemia with iron deficiency: MCV 94 2/2 to active hematemesis i/s/o known esophageal ulcer. Iron 20, Ferritin 154, total binding capacity 164, %sat 12. Likely 2/2 mixed picture of iron deficiency w/ possible concurrent anemia of chronic disease. Hgb remains stable. Patient did not require transfusion this admission. Received 4 days of iron 200 mg IV daily, plan for outpaitent     # Anxiety and depression: On home Abilify 2 mg QD, Paxil 40 qd, and lorazepam 1g PRN. Continue home Abilify and Paxil. Hold lorazepam.    # Seizure Disorder: Last episode 2 years prior, on home lamotrigine. Continue home dose of lamotrigine.    # HLD: On home atorvastatin 20 mg.     # Elevated Alkaline Phosphatase: Normal alk phos and LFTs on admission, uptrend on he 20th prior to diagnosis of PE. Patient found with PE, treated with AC. Uptrend likely in the setting of hepatic congestion secondary to right heart strain, transaminitis downtrended and potentially alk phos is lagging.    # Acute Hypoxic Respiratory Failure secondary to aspiration pneumonia and now pulmonary embolism. Aspiration pneumonia resolved, concern for HAP given recent hospitalization. Treated with Zosyn 4.5 g IV [id number]. Blood culture (2/19) NGTD and blood culture 2/16 NGTD. Wean O2 as tolerated, Incentive spirometry, OOBTC.    Patient was discharged to: [facility]  New medications: Eliquis 10 mg PO BID for 7 days then Eliquis 5 mg BID until follow up with Dr. Montejo. Iron supplementation.   Changes to old medications: Hold outpatient   Medications that were stopped: None.   Items to Follow up as outpatient Follow up with Dr. Montejo, with Dr. Pereira, and GI.  Physical exam at time of discharge:     Constitutional: Thin female with no acute distress, resting comfortably in bed.    HEENT: Sclera non-icteric, neck supple, MMM, nasal canula in place.   Respiratory: Clear to auscultation bilaterally, adequate air entry, no wheezing, no rhonchi, no rales, without accessory muscle use and no intercostal retractions.  Cardiovascular: Regular rate and rhythm, normal S1S2.  Gastrointestinal: soft, non-tender and non-distended, Normoactive bowel sounds.  Extremities: Warm, well perfused, pulses equal bilateral upper and lower extremities, trace lower extremity edema.   Neurological: AAOx3, answers questions appropriately.   Skin: Normal temperature, warm, dry. 78 y/o F w/ hx of hiatal hernia and esophageal ulcer, presenting with 6 days of dark black emesis, hoarseness, and anorexia, admitted for UGIB and aspiration PNA. Underwent EGD which showed a grade D esophageal ulceration, continued on PPI with stable hemoglobin. Pateint initial presentation concerning for aspiration pneumonia which was treated with a 7 day course of Zosyn. Patient with continued hypoxia and found to have numerous bilateral pulmonary emboli, saddle embolus on the right with extension into multiple upper and lower lobe pulmonary arteries with DVTs in the bilateral lower extremities. Patient is now tolerating nasal canula, weaned off high flow. Patient transferred to [facility].     # Saddle Pulmonary embolism with Right Ventricular Strain : Patient admitted with AHRF, treated for pneumonia with continued hypoxia. CTA Chest w/ numerous bilateral pulmonary emboli, saddle embolus on the right with extension into upper and lower lobe pulmonary arteries. LE US shows deep venous thrombosis in bilateral calf veins. TTE showing dilated right ventricular size, reduced right ventricular systolic function. Dilated right atrium. PERT team consulted but ultimately signed off with no interventions planned. No personal history or family history of any blood clots in the past, notably off AC for 5 days on admission. Started on Lovenox 85 mg BID for therapy before transitioning to Eliquis 10 mg BID for 7 days, planned for transition to Eliquis 5 mg BID. Plan to follow up with Dr. Montejo.     # Upper GI bleed secondary to Esophageal ulcer: Presented with UGIB w/ associated coffee-ground emesis. Pt w/ known hx of esophageal ulcer, diagnosed at [facility] in 1/2023, discharged on Carafate and protonix with surgery f/u for hiatal hernia repair. Presented w/ 6 days of dark black emesis. No signs of active bleeding currently, Hgb stable. GI consulted, endoscopy done showing large sized paraesophageal hiatal hernia, Grade D esophagitis with no bleeding was seen starting at 30 cm from the incisors in the lower third of the esophagus and middle third of the esophagus, compatible with nonspecific erosive esophagitis. This is likely the source of coffee ground emesis. Patchy erythema of the mucosa with no bleeding was noted in the stomach body. These findings are compatible with non-erosive gastritis. Pantoprazole 40 mg PO BID x 2 weeks then daily and Carafate Suspension 1g po qid  x 2 weeks.    # Acute blood loss Anemia with iron deficiency: MCV 94 2/2 to active hematemesis i/s/o known esophageal ulcer. Iron 20, Ferritin 154, total binding capacity 164, %sat 12. Likely 2/2 mixed picture of iron deficiency w/ possible concurrent anemia of chronic disease. Hgb remains stable. Patient did not require transfusion this admission. Received 4 days of iron 200 mg IV daily, plan for outpaitent     # Anxiety and depression: On home Abilify 2 mg QD, Paxil 40 qd, and lorazepam 1g PRN. Continue home Abilify and Paxil. Hold lorazepam.    # Seizure Disorder: Last episode 2 years prior, on home lamotrigine. Continue home dose of lamotrigine.    # HLD: On home atorvastatin 20 mg.     # Elevated Alkaline Phosphatase: Normal alk phos and LFTs on admission, uptrend on he 20th prior to diagnosis of PE. Patient found with PE, treated with AC. Uptrend likely in the setting of hepatic congestion secondary to right heart strain, transaminitis downtrended and potentially alk phos is lagging.    # Acute Hypoxic Respiratory Failure secondary to Gram Negative pneumonia and now pulmonary embolism. Aspiration pneumonia resolved, concern for HAP given recent hospitalization. Treated with Zosyn 4.5 g IV i7zwbye. Blood culture (2/19) NGTD and blood culture 2/16 NGTD. Wean O2 as tolerated, Incentive spirometry, OOBTC.    Patient was discharged to: [facility]  New medications: Eliquis 10 mg PO BID for 7 days then Eliquis 5 mg BID until follow up with Dr. Montejo. Iron supplementation.   Changes to old medications: Hold outpatient   Medications that were stopped: None.   Items to Follow up as outpatient Follow up with Dr. Montejo, with Dr. Pereira, and GI.  Physical exam at time of discharge:     Constitutional: Thin female with no acute distress, resting comfortably in bed.    HEENT: Sclera non-icteric, neck supple, MMM, nasal canula in place.   Respiratory: Clear to auscultation bilaterally, adequate air entry, no wheezing, no rhonchi, no rales, without accessory muscle use and no intercostal retractions.  Cardiovascular: Regular rate and rhythm, normal S1S2.  Gastrointestinal: soft, non-tender and non-distended, Normoactive bowel sounds.  Extremities: Warm, well perfused, pulses equal bilateral upper and lower extremities, trace lower extremity edema.   Neurological: AAOx3, answers questions appropriately.   Skin: Normal temperature, warm, dry.

## 2023-02-28 NOTE — PROGRESS NOTE ADULT - PROVIDER SPECIALTY LIST ADULT
Gastroenterology
Internal Medicine
Rehab Medicine
Internal Medicine
Rehab Medicine
Structural Heart
Internal Medicine
Rehab Medicine
Rehab Medicine
Pulmonology
Internal Medicine
Pulmonology
Pulmonology
Internal Medicine

## 2023-02-28 NOTE — DISCHARGE NOTE PROVIDER - NSDCMRMEDTOKEN_GEN_ALL_CORE_FT
apixaban 5 mg oral tablet: 2 tab(s) orally every 12 hours  ARIPiprazole 2 mg oral tablet: 1 tab(s) orally once a day  atorvastatin 20 mg oral tablet: 1 tab(s) orally once a day  Carafate 1 g oral tablet: 1 tab(s) orally 4 times a day (before meals and at bedtime)  Eliquis 5 mg oral tablet: 1 tab(s) orally 2 times a day  iron sucrose 20 mg/mL intravenous solution: 10 milliliter(s) intravenous every 24 hours  lamoTRIgine 100 mg oral tablet, extended release: 1 tab(s) orally once a day  Paxil 40 mg oral tablet: 1 tab(s) orally once a day  Protonix 40 mg oral delayed release tablet: 1 tab(s) orally once a day   apixaban 5 mg oral tablet: 2 tab(s) orally every 12 hours  ARIPiprazole 2 mg oral tablet: 1 tab(s) orally once a day  atorvastatin 20 mg oral tablet: 1 tab(s) orally once a day  Carafate 1 g oral tablet: 1 tab(s) orally 4 times a day (before meals and at bedtime)  Eliquis 5 mg oral tablet: 1 tab(s) orally 2 times a day  lamoTRIgine 100 mg oral tablet, extended release: 1 tab(s) orally once a day  Paxil 40 mg oral tablet: 1 tab(s) orally once a day  Protonix 40 mg oral delayed release tablet: 1 tab(s) orally once a day

## 2023-02-28 NOTE — PROGRESS NOTE ADULT - REASON FOR ADMISSION
Aspiration PNA, hematemesis w/ known esophageal ulcer

## 2023-02-28 NOTE — DISCHARGE NOTE PROVIDER - NSDCCPTREATMENT_GEN_ALL_CORE_FT
PRINCIPAL PROCEDURE  Procedure: CT scan  Findings and Treatment: IMPRESSION:  1. Numerous bilateral pulmonary emboli. There is a saddle embolus on the   right  with extension into multiple upper and lower lobe pulmonary arteries.  2. Ground-glass pulmonary infiltrate within the right upper lobe. This   may be  secondary to atypical or viral pneumonitis.  3. Marked decreased lung volume. There is bibasilar atelectasis with  consolidation and air bronchogram formation. There are bilateral   parapneumonic  effusions.  4. Multiple age indeterminate compression fractures involving the thoracic  spine.  5. Intrathoracic stomach.  Addendum created by Dr. Afia Rees MD on 2/21/2023 12:18:49 AM EST  THIS REPORT CONTAINS FINDINGS  THAT MAY BE CRITICAL TO PATIENT CARE.  The findings were verbally communicated  via telephone conference with ALEYDA GOODMAN at 12:18 AM EST on 2/21/2023.  The findings were acknowledged and understood.

## 2023-02-28 NOTE — PROGRESS NOTE ADULT - PROBLEM SELECTOR PLAN 3
The echocardiogram revealed right heart dilatation related to right ventricular strain from the pulmonary hypertension.  We will follow repeat echocardiogram in 3 month

## 2023-02-28 NOTE — DISCHARGE NOTE PROVIDER - CARE PROVIDERS DIRECT ADDRESSES
,[email or web address]

## 2023-02-28 NOTE — DISCHARGE NOTE PROVIDER - NSDCFUADDAPPT_GEN_ALL_CORE_FT
Plan to follow up with GI at [facility] or [facility] as advised.     Dr. Zapien from general surgery, [facility], for possible correction of esophageal hernia.     GI follow-up at the clinic located on the fourth floor of [facility] [address] [place] [address] by calling the office at [phone]  or she may choose to follow up with her GI physicians at [facility].      Dr. Montejo's office will call to establish an appointment.     Dr. Zapien from general surgery, [facility], for possible correction of esophageal hernia.     Plan to follow up with GI at [facility] or [place] [facility] as advised.   Call to schedule GI follow-up at the clinic located on the fourth floor of [address] [place] [address] by calling the office at [phone]  or she may choose to follow up with her GI physicians at [facility].

## 2023-02-28 NOTE — PROGRESS NOTE ADULT - TIME BILLING
Patient seen and examined with house-staff during bedside rounds.  Resident note read, including vitals, physical findings, laboratory data, and radiological reports.   Revisions included below.  Direct personal management at bed side and extensive interpretation of the data.  Plan was outlined and discussed in details with the housestaff.  Decision making of high complexity  Action taken for acute disease activity to reflect the level of care provided:  - medication reconciliation  - review laboratory data
case complexity as above
case complexity as above

## 2023-02-28 NOTE — DISCHARGE NOTE NURSING/CASE MANAGEMENT/SOCIAL WORK - PATIENT PORTAL LINK FT
You can access the FollowMyHealth Patient Portal offered by [facility] by registering at the following website: [email or web address]. By joining [facility]’s FollowMyHealth portal, you will also be able to view your health information using other applications (apps) compatible with our system.

## 2023-02-28 NOTE — DISCHARGE NOTE PROVIDER - NSDCCPCAREPLAN_GEN_ALL_CORE_FT
PRINCIPAL DISCHARGE DIAGNOSIS  Diagnosis: Pulmonary embolism  Assessment and Plan of Treatment: Please see the attached informational papers about pulmonary embolisms. You were diagnosed witha  pulomonary embolism on a CT scan that was performed within the hospital. You will plan to follow up with Dr. Gustabo Andrade as scheduled.      SECONDARY DISCHARGE DIAGNOSES  Diagnosis: Esophageal ulcer  Assessment and Plan of Treatment:     Diagnosis: Gram-negative pneumonia  Assessment and Plan of Treatment:     Diagnosis: Anemia  Assessment and Plan of Treatment:      PRINCIPAL DISCHARGE DIAGNOSIS  Diagnosis: Pulmonary embolism  Assessment and Plan of Treatment: Please see the attached informational papers about pulmonary embolisms. You were diagnosed witha  pulomonary embolism on a CT scan that was performed within the hospital. You will plan to follow up with Dr. Gustabo Andrade as scheduled.  monitor yourself for worsening symptoms fo shortness of breath for plans return to hospital for further evaluation.      SECONDARY DISCHARGE DIAGNOSES  Diagnosis: Esophageal ulcer  Assessment and Plan of Treatment: Please see the attached information about ulcerations in the GI tract. Continue to take your medication as prescribed, including protonix and carafate. Please follow up with the GI team and your primary doctor, Dr. Pereira as prescribed.    Diagnosis: Gram-negative pneumonia  Assessment and Plan of Treatment: Yuo were treated with a 7 day course of Zosyn 4.5 grams IV with concern for hospital acquired pneumonia.    Diagnosis: Anemia  Assessment and Plan of Treatment:

## 2023-02-28 NOTE — DISCHARGE NOTE PROVIDER - CARE PROVIDER_API CALL
Lupe Montejo)  Critical Care Medicine; Pulmonary Disease  [address] [place] [address]  Phone: [phone]  Fax: [phone]  Established Patient  Follow Up Time: 1 month   Lupe Montejo)  Critical Care Medicine; Pulmonary Disease  [address] [place] [address]  Phone: [phone]  Fax: [phone]  Established Patient  Follow Up Time: 1 month    OBEY MIN  Internal Medicine  Phone: [phone]  Established Patient  Scheduled Appointment: 03/14/2023 02:40 PM

## 2023-02-28 NOTE — PROGRESS NOTE ADULT - SUBJECTIVE AND OBJECTIVE BOX
Interval Events: Reviewed  Patient seen and examined at bedside.    Patient is a 77y old  Female who presents with a chief complaint of Aspiration PNA, hematemesis w/ known esophageal ulcer (28 Feb 2023 11:05)  she is doing well    PAST MEDICAL & SURGICAL HISTORY:  Hiatal hernia      High cholesterol      History of esophageal ulcer      HTN (hypertension)      Major depression      Seizure disorder      Anxiety      No significant past surgical history          MEDICATIONS:  Pulmonary:    Antimicrobials:    Anticoagulants:  apixaban 10 milliGRAM(s) Oral every 12 hours    Cardiac:      Allergies    lanolin topical (Rash)    Intolerances        Vital Signs Last 24 Hrs  T(C): 36.7 (28 Feb 2023 11:15), Max: 36.7 (28 Feb 2023 11:15)  T(F): 98.1 (28 Feb 2023 11:15), Max: 98.1 (28 Feb 2023 11:15)  HR: 62 (28 Feb 2023 11:15) (62 - 65)  BP: 134/68 (28 Feb 2023 11:15) (125/72 - 134/68)  BP(mean): --  RR: 16 (28 Feb 2023 11:15) (16 - 16)  SpO2: 97% (28 Feb 2023 11:15) (97% - 97%)    Parameters below as of 28 Feb 2023 11:15  Patient On (Oxygen Delivery Method): nasal cannula w/ humidification  O2 Flow (L/min): 4          Review of Systems:   •	General: negative  •	Skin/Breast: negative  •	Ophthalmologic: negative  •	ENMT: negative  •	Respiratory and Thorax: negative  •	Cardiovascular: negative  •	Gastrointestinal: negative  •	Genitourinary: negative  •	Musculoskeletal: negative  •	Neurological: negative  •	Psychiatric: negative  •	Hematology/Lymphatics: negative  •	Endocrine: negative  •	Allergic/Immunologic: negative    Physical Exam:   • Constitutional:	refer to the dietion /Nutritionist note  • Eyes:	EOMI; PERRL; no drainage or redness  • ENMT:	No oral lesions; no gross abnormalities  • Neck	No bruits; no thyromegaly or nodules  • Breasts:	not examined  • Back:	No deformity or limitation of movement  • Respiratory:	Breath Sounds equal & clear to percussion & auscultation, no accessory muscle use  • Cardiovascular:	Regular rate & rhythm, normal S1, S2; no murmurs, gallops or rubs; no S3, S4  • Gastrointestinal:	Soft, non-tender, no hepatosplenomegaly, normal bowel sounds  • Genitourinary:	not examined  • Rectal: not examined  • Extremities:	No cyanosis, clubbing or edema  • Vascular:	Equal and normal pulses (carotid, femoral, dorsalis pedis)  • Neurologica:l	not examined  • Skin:	No lesions; no rash  • Lymph Nodes:	No lymphadedenopathy  • Musculoskeletal:	No joint pain, swelling or deformity; no limitation of movement        LABS:      CBC Full  -  ( 28 Feb 2023 05:30 )  WBC Count : 6.23 K/uL  RBC Count : 2.73 M/uL  Hemoglobin : 7.9 g/dL  Hematocrit : 26.6 %  Platelet Count - Automated : 340 K/uL  Mean Cell Volume : 97.4 fl  Mean Cell Hemoglobin : 28.9 pg  Mean Cell Hemoglobin Concentration : 29.7 gm/dL  Auto Neutrophil # : x  Auto Lymphocyte # : x  Auto Monocyte # : x  Auto Eosinophil # : x  Auto Basophil # : x  Auto Neutrophil % : x  Auto Lymphocyte % : x  Auto Monocyte % : x  Auto Eosinophil % : x  Auto Basophil % : x    02-28    138  |  103  |  8   ----------------------------<  110<H>  3.8   |  30  |  0.59    Ca    8.7      28 Feb 2023 05:30  Phos  2.7     02-28  Mg     1.8     02-28    TPro  4.8<L>  /  Alb  2.5<L>  /  TBili  <0.2  /  DBili  x   /  AST  23  /  ALT  36  /  AlkPhos  145<H>  02-27                        RADIOLOGY & ADDITIONAL STUDIES (The following images were personally reviewed):

## 2023-02-28 NOTE — DISCHARGE NOTE PROVIDER - PROVIDER TOKENS
PROVIDER:[TOKEN:[[id number]],FOLLOWUP:[1 month],ESTABLISHEDPATIENT:[T]] PROVIDER:[TOKEN:[[id number]],FOLLOWUP:[1 month],ESTABLISHEDPATIENT:[T]],PROVIDER:[TOKEN:[[id number]],SCHEDULEDAPPT:[03/14/2023],SCHEDULEDAPPTTIME:[02:40 PM],ESTABLISHEDPATIENT:[T]]

## 2023-02-28 NOTE — PROGRESS NOTE ADULT - PROBLEM SELECTOR PLAN 2
The patient is clinically stable.  Continue anticoagulation changed to Lovenox.  The oxygen requirement decreased.  No intervention at this point  Change to apoxaban duration indefinite

## 2023-03-07 RX ORDER — APIXABAN 2.5 MG/1
1 TABLET, FILM COATED ORAL
Qty: 60 | Refills: 2
Start: 2023-03-07 | End: 2023-06-04

## 2023-03-08 ENCOUNTER — TRANSCRIPTION ENCOUNTER (OUTPATIENT)
Age: 78
End: 2023-03-08

## 2023-03-13 DIAGNOSIS — I08.2 RHEUMATIC DISORDERS OF BOTH AORTIC AND TRICUSPID VALVES: ICD-10-CM

## 2023-03-13 DIAGNOSIS — Z66 DO NOT RESUSCITATE: ICD-10-CM

## 2023-03-13 DIAGNOSIS — K22.11 ULCER OF ESOPHAGUS WITH BLEEDING: ICD-10-CM

## 2023-03-13 DIAGNOSIS — E87.3 ALKALOSIS: ICD-10-CM

## 2023-03-13 DIAGNOSIS — M25.551 PAIN IN RIGHT HIP: ICD-10-CM

## 2023-03-13 DIAGNOSIS — D50.9 IRON DEFICIENCY ANEMIA, UNSPECIFIED: ICD-10-CM

## 2023-03-13 DIAGNOSIS — J96.01 ACUTE RESPIRATORY FAILURE WITH HYPOXIA: ICD-10-CM

## 2023-03-13 DIAGNOSIS — K29.70 GASTRITIS, UNSPECIFIED, WITHOUT BLEEDING: ICD-10-CM

## 2023-03-13 DIAGNOSIS — I11.0 HYPERTENSIVE HEART DISEASE WITH HEART FAILURE: ICD-10-CM

## 2023-03-13 DIAGNOSIS — I82.453 ACUTE EMBOLISM AND THROMBOSIS OF PERONEAL VEIN, BILATERAL: ICD-10-CM

## 2023-03-13 DIAGNOSIS — J15.6 PNEUMONIA DUE TO OTHER GRAM-NEGATIVE BACTERIA: ICD-10-CM

## 2023-03-13 DIAGNOSIS — I26.02 SADDLE EMBOLUS OF PULMONARY ARTERY WITH ACUTE COR PULMONALE: ICD-10-CM

## 2023-03-13 DIAGNOSIS — I27.20 PULMONARY HYPERTENSION, UNSPECIFIED: ICD-10-CM

## 2023-03-13 DIAGNOSIS — E78.5 HYPERLIPIDEMIA, UNSPECIFIED: ICD-10-CM

## 2023-03-13 DIAGNOSIS — N17.9 ACUTE KIDNEY FAILURE, UNSPECIFIED: ICD-10-CM

## 2023-03-13 DIAGNOSIS — M25.552 PAIN IN LEFT HIP: ICD-10-CM

## 2023-03-13 DIAGNOSIS — R65.10 SYSTEMIC INFLAMMATORY RESPONSE SYNDROME (SIRS) OF NON-INFECTIOUS ORIGIN WITHOUT ACUTE ORGAN DYSFUNCTION: ICD-10-CM

## 2023-03-13 DIAGNOSIS — R19.7 DIARRHEA, UNSPECIFIED: ICD-10-CM

## 2023-03-13 DIAGNOSIS — F32.A DEPRESSION, UNSPECIFIED: ICD-10-CM

## 2023-03-13 DIAGNOSIS — D62 ACUTE POSTHEMORRHAGIC ANEMIA: ICD-10-CM

## 2023-03-13 DIAGNOSIS — K44.9 DIAPHRAGMATIC HERNIA WITHOUT OBSTRUCTION OR GANGRENE: ICD-10-CM

## 2023-03-13 DIAGNOSIS — J69.0 PNEUMONITIS DUE TO INHALATION OF FOOD AND VOMIT: ICD-10-CM

## 2023-03-13 DIAGNOSIS — G89.29 OTHER CHRONIC PAIN: ICD-10-CM

## 2023-03-13 DIAGNOSIS — M25.519 PAIN IN UNSPECIFIED SHOULDER: ICD-10-CM

## 2023-03-13 DIAGNOSIS — F41.9 ANXIETY DISORDER, UNSPECIFIED: ICD-10-CM

## 2023-03-13 DIAGNOSIS — I50.811 ACUTE RIGHT HEART FAILURE: ICD-10-CM

## 2023-03-13 DIAGNOSIS — I82.442 ACUTE EMBOLISM AND THROMBOSIS OF LEFT TIBIAL VEIN: ICD-10-CM

## 2023-03-13 DIAGNOSIS — G40.909 EPILEPSY, UNSPECIFIED, NOT INTRACTABLE, WITHOUT STATUS EPILEPTICUS: ICD-10-CM

## 2023-03-21 ENCOUNTER — TRANSCRIPTION ENCOUNTER (OUTPATIENT)
Age: 78
End: 2023-03-21

## 2023-03-29 ENCOUNTER — TRANSCRIPTION ENCOUNTER (OUTPATIENT)
Age: 78
End: 2023-03-29

## 2023-09-11 NOTE — PHYSICAL THERAPY INITIAL EVALUATION ADULT - PATIENT/FAMILY/SIGNIFICANT OTHER GOALS STATEMENT, PT EVAL
To get better Odomzo Counseling- I discussed with the patient the risks of Odomzo including but not limited to nausea, vomiting, diarrhea, constipation, weight loss, changes in the sense of taste, decreased appetite, muscle spasms, and hair loss.  The patient verbalized understanding of the proper use and possible adverse effects of Odomzo.  All of the patient's questions and concerns were addressed.

## 2024-02-15 PROCEDURE — 0: CUSTOM

## 2024-08-15 NOTE — DISCHARGE NOTE NURSING/CASE MANAGEMENT/SOCIAL WORK - NSTRANSFERBELONGINGSDISPO_GEN_A_NUR
[place] diet.  Push fluids with electrolytes.  Alternate Tylenol Motrin.  Zofran as needed for further nausea  
given to security

## 2024-10-06 NOTE — PROGRESS NOTE ADULT - PROBLEM SELECTOR PLAN 6
No On home losartan 100 mg QD.  Plan:  - hold i/s/o normotension   - restart as clinically indicated

## 2024-12-23 NOTE — ED ADULT NURSE NOTE - HAVE YOU RECEIVED AT LEAST TWO PFIZER AND/OR MODERNA VACCINATIONS (IN ANY COMBINATION) AND/OR ONE JOHNSON & JOHNSON VACCINATION?
Is the patient currently in the state of [place]? YES    Visit mode:VIDEO    If the visit is dropped, the patient can be reconnected by:VIDEO VISIT: Send to e-mail at: [email or web address]    Will anyone else be joining the visit? NO  (If patient encounters technical issues they should call [phone]    Are changes needed to the allergy or medication list? No    Are refills needed on medications prescribed by this physician? NO    Rooming Documentation:  Questionnaire(s) completed    Reason for visit: Video Visit (Follow up )    Madie CONWAY      Yes
